# Patient Record
Sex: MALE | Race: WHITE | Employment: OTHER | RURAL
[De-identification: names, ages, dates, MRNs, and addresses within clinical notes are randomized per-mention and may not be internally consistent; named-entity substitution may affect disease eponyms.]

---

## 2017-02-24 ENCOUNTER — HOSPITAL ENCOUNTER (OUTPATIENT)
Dept: OTHER | Age: 52
Discharge: HOME | End: 2017-02-24
Attending: NURSE PRACTITIONER | Admitting: NURSE PRACTITIONER

## 2017-02-24 LAB
ANION GAP SERPL CALCULATED.3IONS-SCNC: 17 MMOL/L (ref 3–16)
BUN BLDV-MCNC: 8 MG/DL (ref 6–20)
CALCIUM SERPL-MCNC: 10.2 MG/DL (ref 8.5–10.5)
CHLORIDE BLD-SCNC: 91 MMOL/L (ref 98–107)
CHOLESTEROL, TOTAL: 218 MG/DL (ref 0–200)
CO2: 28 MMOL/L (ref 20–30)
CREAT SERPL-MCNC: 1.2 MG/DL (ref 0.4–1.2)
GFR AFRICAN AMERICAN: >59
GFR NON-AFRICAN AMERICAN: >59
GLUCOSE BLD-MCNC: 203 MG/DL (ref 74–106)
HBA1C MFR BLD: 7.7 %
HDLC SERPL-MCNC: 45 MG/DL (ref 40–60)
LDL CHOLESTEROL CALCULATED: 99 MG/DL
POTASSIUM SERPL-SCNC: 4.1 MMOL/L (ref 3.4–5.1)
SODIUM BLD-SCNC: 136 MMOL/L (ref 136–145)
TRIGL SERPL-MCNC: 372 MG/DL (ref 0–249)
VLDLC SERPL CALC-MCNC: 74 MG/DL

## 2017-03-13 ENCOUNTER — HOSPITAL ENCOUNTER (OUTPATIENT)
Dept: OTHER | Age: 52
Discharge: HOME | End: 2017-03-13
Attending: NURSE PRACTITIONER | Admitting: NURSE PRACTITIONER

## 2017-03-13 DIAGNOSIS — M54.2 CERVICALGIA: ICD-10-CM

## 2017-03-13 DIAGNOSIS — M54.5 LOW BACK PAIN, UNSPECIFIED BACK PAIN LATERALITY, UNSPECIFIED CHRONICITY, WITH SCIATICA PRESENCE UNSPECIFIED: ICD-10-CM

## 2017-03-31 ENCOUNTER — HOSPITAL ENCOUNTER (OUTPATIENT)
Dept: OTHER | Age: 52
Discharge: HOME | End: 2017-03-31
Attending: NURSE PRACTITIONER | Admitting: NURSE PRACTITIONER

## 2017-04-03 LAB
AMPHETAMINES SCREEN BLOOD: NEGATIVE NG/ML
BARBITURATES SCREEN BLOOD: NEGATIVE NG/ML
BENZODIAZEPINES SCREEN BLOOD: POSITIVE NG/ML
BUPRENORPHINE: NEGATIVE NG/ML
CANNABINOID SCREEN BLOOD: NEGATIVE NG/ML
COCAINE SCREEN BLOOD: NEGATIVE NG/ML
Lab: NORMAL
METHADONE SCREEN BLOOD: NEGATIVE NG/ML
METHAMPHETAMINES SERUM/ PLASMA: NEGATIVE NG/ML
OPIATES SCREEN BLOOD: NEGATIVE NG/ML
OXYCODONE: POSITIVE NG/ML
PHENCYCLIDINE SCREEN BLOOD: NEGATIVE NG/ML

## 2017-04-06 LAB
OPIATES, HYDROCODONE: <2 NG/ML
OPIATES, HYDROMORPHONE: <2 NG/ML
OPIATES, OXYCODONE: 74 NG/ML
OPIATES, OXYMORPHONE: <2 NG/ML
OPIATES, SER/ PLASMA CODEINE: <2 NG/ML
OPIATES, SER/PLAS: <2 NG/ML
OPITATES, MORPHINE: <2 NG/ML

## 2017-06-26 ENCOUNTER — HOSPITAL ENCOUNTER (OUTPATIENT)
Dept: OTHER | Age: 52
Discharge: HOME | End: 2017-06-26
Attending: NURSE PRACTITIONER | Admitting: NURSE PRACTITIONER

## 2017-06-26 LAB
A/G RATIO: 1.7 (ref 0.8–2)
ALBUMIN SERPL-MCNC: 5.1 G/DL (ref 3.4–4.8)
ALP BLD-CCNC: 36 U/L (ref 25–100)
ALT SERPL-CCNC: 22 U/L (ref 4–36)
ANION GAP SERPL CALCULATED.3IONS-SCNC: 16 MMOL/L (ref 3–16)
AST SERPL-CCNC: 16 U/L (ref 8–33)
BASOPHILS ABSOLUTE: 0.1 K/UL (ref 0–0.1)
BASOPHILS RELATIVE PERCENT: 0.9 %
BILIRUB SERPL-MCNC: <0.2 MG/DL (ref 0.3–1.2)
BUN BLDV-MCNC: 11 MG/DL (ref 6–20)
CALCIUM SERPL-MCNC: 10.5 MG/DL (ref 8.5–10.5)
CHLORIDE BLD-SCNC: 94 MMOL/L (ref 98–107)
CO2: 29 MMOL/L (ref 20–30)
CREAT SERPL-MCNC: 1.3 MG/DL (ref 0.4–1.2)
EOSINOPHILS ABSOLUTE: 0.2 K/UL (ref 0–0.4)
EOSINOPHILS RELATIVE PERCENT: 3.4 %
GFR AFRICAN AMERICAN: >59
GFR NON-AFRICAN AMERICAN: 58
GLOBULIN: 3 G/DL
GLUCOSE BLD-MCNC: 136 MG/DL (ref 74–106)
HBA1C MFR BLD: 6.3 %
HCT VFR BLD CALC: 36.4 % (ref 40–54)
HEMOGLOBIN: 12.4 G/DL (ref 13–18)
LYMPHOCYTES ABSOLUTE: 2.1 K/UL (ref 1.5–4)
LYMPHOCYTES RELATIVE PERCENT: 29.6 % (ref 20–40)
MCH RBC QN AUTO: 30.8 PG (ref 27–32)
MCHC RBC AUTO-ENTMCNC: 34.1 G/DL (ref 31–35)
MCV RBC AUTO: 90.3 FL (ref 80–100)
MONOCYTES ABSOLUTE: 0.5 K/UL (ref 0.2–0.8)
MONOCYTES RELATIVE PERCENT: 6.6 % (ref 3–10)
NEUTROPHILS ABSOLUTE: 4.2 K/UL (ref 2–7.5)
NEUTROPHILS RELATIVE PERCENT: 59.5 %
PDW BLD-RTO: 13.6 % (ref 11–16)
PLATELET # BLD: 383 K/UL (ref 150–400)
PMV BLD AUTO: 9.2 FL (ref 6–10)
POTASSIUM SERPL-SCNC: 4.3 MMOL/L (ref 3.4–5.1)
RBC # BLD: 4.03 M/UL (ref 4.5–6)
SODIUM BLD-SCNC: 139 MMOL/L (ref 136–145)
TOTAL PROTEIN: 8.1 G/DL (ref 6.4–8.3)
WBC # BLD: 7 K/UL (ref 4–11)

## 2017-09-07 ENCOUNTER — OFFICE VISIT (OUTPATIENT)
Dept: NEUROLOGY | Facility: CLINIC | Age: 52
End: 2017-09-07

## 2017-09-07 VITALS
BODY MASS INDEX: 28.4 KG/M2 | SYSTOLIC BLOOD PRESSURE: 122 MMHG | WEIGHT: 187.4 LBS | HEART RATE: 95 BPM | DIASTOLIC BLOOD PRESSURE: 78 MMHG | HEIGHT: 68 IN | OXYGEN SATURATION: 95 %

## 2017-09-07 DIAGNOSIS — M54.2 CERVICALGIA: ICD-10-CM

## 2017-09-07 DIAGNOSIS — G43.019 INTRACTABLE MIGRAINE WITHOUT AURA AND WITHOUT STATUS MIGRAINOSUS: Primary | ICD-10-CM

## 2017-09-07 PROCEDURE — 99204 OFFICE O/P NEW MOD 45 MIN: CPT | Performed by: NURSE PRACTITIONER

## 2017-09-07 RX ORDER — IBUPROFEN 600 MG/1
TABLET ORAL
Refills: 0 | COMMUNITY
Start: 2017-08-28

## 2017-09-07 RX ORDER — PROMETHAZINE HYDROCHLORIDE 25 MG/1
TABLET ORAL
Refills: 0 | COMMUNITY
Start: 2017-07-15 | End: 2017-10-24

## 2017-09-07 RX ORDER — ASPIRIN 81 MG/1
TABLET ORAL
COMMUNITY

## 2017-09-07 RX ORDER — AMITRIPTYLINE HYDROCHLORIDE 10 MG/1
10 TABLET, FILM COATED ORAL NIGHTLY
Qty: 30 TABLET | Refills: 3 | Status: SHIPPED | OUTPATIENT
Start: 2017-09-07 | End: 2017-10-24

## 2017-09-07 RX ORDER — CYCLOBENZAPRINE HCL 10 MG
TABLET ORAL
Refills: 0 | COMMUNITY
Start: 2017-08-08 | End: 2017-10-24

## 2017-09-07 RX ORDER — PROPRANOLOL HYDROCHLORIDE 20 MG/1
TABLET ORAL
COMMUNITY
Start: 2013-10-15

## 2017-09-07 RX ORDER — BUPROPION HYDROCHLORIDE 75 MG/1
TABLET ORAL
COMMUNITY
Start: 2013-09-24

## 2017-09-07 RX ORDER — DULOXETIN HYDROCHLORIDE 60 MG/1
CAPSULE, DELAYED RELEASE ORAL
COMMUNITY
Start: 2013-04-30
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
## 2017-09-21 ENCOUNTER — HOSPITAL ENCOUNTER (OUTPATIENT)
Dept: MRI IMAGING | Facility: HOSPITAL | Age: 52
Discharge: HOME | End: 2017-09-21
Admitting: NURSE PRACTITIONER

## 2017-09-21 DIAGNOSIS — G43.019 INTRACTABLE MIGRAINE WITHOUT AURA AND WITHOUT STATUS MIGRAINOSUS: ICD-10-CM

## 2017-09-21 PROCEDURE — A9577 INJ MULTIHANCE: HCPCS | Performed by: NURSE PRACTITIONER

## 2017-09-21 PROCEDURE — 70553 MRI BRAIN STEM W/O & W/DYE: CPT

## 2017-09-21 PROCEDURE — 25510000001 GADOBENATE DIMEGLUMINE 529 MG/ML SOLUTION: Performed by: NURSE PRACTITIONER

## 2017-09-21 RX ADMIN — GADOBENATE DIMEGLUMINE 15 ML: 529 INJECTION, SOLUTION INTRAVENOUS at 13:45

## 2017-10-24 ENCOUNTER — PROCEDURE VISIT (OUTPATIENT)
Dept: NEUROLOGY | Facility: CLINIC | Age: 52
End: 2017-10-24

## 2017-10-24 VITALS
BODY MASS INDEX: 28.04 KG/M2 | HEIGHT: 68 IN | OXYGEN SATURATION: 98 % | SYSTOLIC BLOOD PRESSURE: 148 MMHG | DIASTOLIC BLOOD PRESSURE: 82 MMHG | WEIGHT: 185 LBS | HEART RATE: 87 BPM

## 2017-10-24 DIAGNOSIS — G43.019 INTRACTABLE MIGRAINE WITHOUT AURA AND WITHOUT STATUS MIGRAINOSUS: Primary | ICD-10-CM

## 2017-10-24 DIAGNOSIS — M54.2 CERVICALGIA: ICD-10-CM

## 2017-10-24 PROCEDURE — 20553 NJX 1/MLT TRIGGER POINTS 3/>: CPT | Performed by: NURSE PRACTITIONER

## 2017-10-24 RX ORDER — BUPIVACAINE HYDROCHLORIDE 5 MG/ML
5 INJECTION, SOLUTION PERINEURAL ONCE
Status: SHIPPED | OUTPATIENT
Start: 2017-10-24

## 2017-10-24 RX ORDER — SUMATRIPTAN 50 MG/1
50 TABLET, FILM COATED ORAL ONCE AS NEEDED
Qty: 9 TABLET | Refills: 2 | Status: SHIPPED | OUTPATIENT
Start: 2017-10-24 | End: 2017-11-23

## 2017-10-24 RX ORDER — METHYLPREDNISOLONE ACETATE 40 MG/ML
200 INJECTION, SUSPENSION INTRA-ARTICULAR; INTRALESIONAL; INTRAMUSCULAR; SOFT TISSUE ONCE
Status: SHIPPED | OUTPATIENT
Start: 2017-10-24

## 2017-10-24 RX ORDER — TIZANIDINE 4 MG/1
4 TABLET ORAL 3 TIMES DAILY
Qty: 90 TABLET | Refills: 1 | Status: SHIPPED | OUTPATIENT
Start: 2017-10-24 | End: 2018-01-18 | Stop reason: SDUPTHER

## 2017-10-24 RX ORDER — METHYLPREDNISOLONE ACETATE 40 MG/ML
200 INJECTION, SUSPENSION INTRA-ARTICULAR; INTRALESIONAL; INTRAMUSCULAR; SOFT TISSUE ONCE
Status: COMPLETED | OUTPATIENT
Start: 2017-10-24 | End: 2017-10-24

## 2017-10-24 RX ORDER — HYDROCODONE BITARTRATE AND ACETAMINOPHEN 10; 325 MG/1; MG/1
TABLET ORAL
COMMUNITY

## 2017-10-24 RX ORDER — AMITRIPTYLINE HYDROCHLORIDE 10 MG/1
10 TABLET, FILM COATED ORAL NIGHTLY
Qty: 30 TABLET | Refills: 2 | Status: SHIPPED | OUTPATIENT
Start: 2017-10-24

## 2017-10-24 RX ORDER — BUPIVACAINE HYDROCHLORIDE 5 MG/ML
5 INJECTION, SOLUTION PERINEURAL ONCE
Status: COMPLETED | OUTPATIENT
Start: 2017-10-24 | End: 2017-10-24

## 2017-10-24 RX ADMIN — METHYLPREDNISOLONE ACETATE 200 MG: 40 INJECTION, SUSPENSION INTRA-ARTICULAR; INTRALESIONAL; INTRAMUSCULAR; SOFT TISSUE at 11:27

## 2017-10-24 RX ADMIN — BUPIVACAINE HYDROCHLORIDE 5 ML: 5 INJECTION, SOLUTION PERINEURAL at 11:27

## 2017-10-24 NOTE — PROGRESS NOTES
Patient is suffering from headache and myofacial pain syndrome. Risks and benefits of the Trigger point injection procedure have been explained to the patient.  Patient has no contraindications such as bleed diathesis, recent acute trauma at the muscle sites, anesthetic allergy or anticoagulation.  Mechanism of trigger point injection has been explained to patient.     Procedure Note:  1.         Patient was positioned comfortably  2.         Before injections are started, 10 Trigger Points sites are identified throughout the bilateral upper trapezius muscle, levator scapulae, and erector spinae muscles.    3.         Overlying skin area was cleaned with Alcohol swab                                                                                                              4.         Before injection, trigger points sites were palpated for local twitch and referred pain to confirms placement                         5.         Local anesthetic was mixed with Depo-Medrol (5 cc of Marcaine 0.5% mixed with 5 cc of Depo-Medrol at 40 mg/ml - for a total of 10 cc)  6.         30 gauge ½ inch needle was utilized to ensure patient comfort          7.         I started with the most tender spot in above mentioned Trigger Points   1.   Localize most tender spot within taut muscle-fibers  2.   Fix tender spot between fingers (1-2 cm in size)   1.   Prevents from rolling away from needle  2.   Controls subcutaneous bleeding  3.   Before injection, patient was instructed of possible pain on injection  4.   Direct needle at 30 degree angle off skin   8.         Insert needle into skin 1-2 cm from Trigger Point   9.         Advance needle into Trigger Point   10.       Use 1cc anesthetic at each Trigger Points identified in step #2  11.       Redirect needle and reinject                                                                                              1.   Withdraw needle to subcutaneous tissue  2.   Redirect needle  into adjacent tender areas  3.   Repeat until local twitch or tautness resolves  12.   After each of the 7 injections I held direct pressure at injection site for 2 minutes to prevents hematoma formation  13.   The same procedure was repeated for other Tender Points located in the upper trapezius muscle, levator scapulae and erector spinae bilaterally  14.   Patient was instructed to gently stretch injected areas to full active range of motion in all directions and was instructed to repeat range of motion three times after injection  15.   Post-Procedure patient was instructed to avoid over-using injected area for 3-4 days   1.   Maintain active range of motion of injected muscle  2.   Patient applies ice to injected areas for a few hours  3.   Anticipate post-injection soreness for 3-4 days  There was no evidence of complications from injection was noted such as local Skin Infection  at injection site or local hematoma at injection site  Patient with a migraine when starting trigger point injections.  Patient declines the last 3 injections

## 2017-10-24 NOTE — PROGRESS NOTES
"Subjective   Sumit Echeverria is a 52 y.o. male     Chief Complaint   Patient presents with   • Procedure     Pt is here for trigger injections. This is the first round of injections for neck pain.        History of Present Illness    The following portions of the patient's history were reviewed and updated as appropriate: allergies, current medications, past family history, past medical history, past social history, past surgical history and problem list.    Review of Systems    Objective         Vitals:    10/24/17 0925   BP: 148/82   Pulse: 87   SpO2: 98%   Weight: 185 lb (83.9 kg)   Height: 68\" (172.7 cm)     GENERAL: Patient is pleasant, cooperative, appears to be stated age.  Body habitus is endomorphic.  SKIN AND EXTREMITIES:  No skin rashes or lesions are noted.  No cyanosis, clubbing or edema of the extremities.    HEAD:  Head is normocephalic and atraumatic.    NECK: Neck are non-tender without thyromegaly or adenopathy.  Carotic upstrokes are 1+/4.  No cranial or cervical bruits.  The neck is supple with a full range of motion.   ENT: palate elevate symmetrically, no evidence of high arch palate, tongue midline erythema in posterior pharynx, Mallampati Classification Class III   CARDIOVASCULAR:  Regular rate and rhythm with normal S1 and S2 without rub or gallop.  RESPIRATORY:  Clear to auscultation without wheezes or crackle   BACK:  Back is straight without midline defect.    PSYCH:  Higher cortical function/mental status:  The patient is alert.  He  is oriented x3 to time, place and person.  Recent and the remote memory appear normal.  The patient has a good fund of knowledge.  There is no visual or auditory hallucination or suicidal or homicidal ideation.  SPEECH:There is no gross evidence of aphasia, dysarthria or agnosia.      CRANIAL NERVES:  Pupils are 4mm, equal round reactive to light, reacting briskly to 2mm without afferent pupillary defect.  Visual fields are intact to confrontation testing.  " Fundoscopic examination reveals sharp disk margins with normal vasculature.  No papilledema, hemorrhages or exudates.  Extraocular movements are full and smooth with normal pursuits and saccades.  No nystagmus noted.  The face is symmetric. palate elevate symmetrically, Tongue midline, positive gag reflex. The remainder of the cranial nerves are intact and symmetrical.    MOTOR: Strength is 5/5 throughout with normal tone and bulk  No involuntary movements noted.    DTR: are 2/4 and symmetrical in the upper extremities, 2/4 and symmetrical at the knees and 1/4 and symmetrical at the Achilles tendon.  Plantar responses were down-going bilaterally.    SENSATION:  Intact to pinprick, light touch, vibration and proprioception.  Coordination:  The patient normally performs finger-nose-finger, heel-to-knee-to-shin and rapid alternating movements in symmetrical fashion.    COORDINATION AND GAIT:  The patient walks with a narrow-based gait.  Patient is able to heel-toe and tandem walk forward and backwards without difficulty.  Romberg and monopedal  Romberg are negative.    MUSCULOSKELETAL: Range of motion normal, no clubbing, cyanosis, or edema.  No joint swelling.     I have personally reviewed the above labs and imaging studies.    Assessment/Plan   1 migraines: We will start patient on Elavil since patient has a postconcussive type migraine.  Continue with Inderal as Cymbalta and Flexeril.  She is also willing to have an MRI of his brain with and without this has been ordered.     2.  Cervicalgia: Offered patient physical therapy at this point patient does not feel that he would be able to tolerate as he is extremely tender.  Patient is willing to start trigger point injections.  Thank you

## 2018-01-19 RX ORDER — TIZANIDINE 4 MG/1
TABLET ORAL
Qty: 90 TABLET | Refills: 0 | Status: SHIPPED | OUTPATIENT
Start: 2018-01-19

## 2018-01-30 ENCOUNTER — PROCEDURE VISIT (OUTPATIENT)
Dept: NEUROLOGY | Facility: CLINIC | Age: 53
End: 2018-01-30

## 2018-01-30 VITALS
HEIGHT: 68 IN | WEIGHT: 185 LBS | DIASTOLIC BLOOD PRESSURE: 88 MMHG | OXYGEN SATURATION: 98 % | BODY MASS INDEX: 28.04 KG/M2 | HEART RATE: 112 BPM | SYSTOLIC BLOOD PRESSURE: 108 MMHG

## 2018-01-30 DIAGNOSIS — M54.41 CHRONIC BILATERAL LOW BACK PAIN WITH BILATERAL SCIATICA: Primary | ICD-10-CM

## 2018-01-30 DIAGNOSIS — G89.29 CHRONIC BILATERAL LOW BACK PAIN WITH BILATERAL SCIATICA: Primary | ICD-10-CM

## 2018-01-30 DIAGNOSIS — M54.2 CERVICALGIA: ICD-10-CM

## 2018-01-30 DIAGNOSIS — M54.42 CHRONIC BILATERAL LOW BACK PAIN WITH BILATERAL SCIATICA: Primary | ICD-10-CM

## 2018-01-30 RX ORDER — METHYLPREDNISOLONE 4 MG/1
TABLET ORAL
Refills: 0 | COMMUNITY
Start: 2018-01-23

## 2018-01-30 RX ORDER — CEFDINIR 300 MG/1
CAPSULE ORAL
Refills: 0 | COMMUNITY
Start: 2018-01-23

## 2018-01-30 NOTE — PROGRESS NOTES
Subjective   Sumit Echeverria is a 52 y.o. male    Chief Complaint   Patient presents with   • Procedure     Pt is here for trigger injections.        History of Present Illness    Pt 53 yo male in clinic to get trigger point injections. Pt states this will be his 6th set of injections without relief. Pt states he does not want injections today.  Pt has cervicalgia with daily headaches with 15 migraines monthly. + occipital radiating bilat temples left > right Pt has failed multiple meds and trigger points.  Failed PT.  Pt receiving hydrocodone for back injuries with pain.  Pt states this is not helping.  Requests pain clinic appt     The following portions of the patient's history were reviewed and updated as appropriate: allergies, current medications, past family history, past medical history, past social history, past surgical history and problem list.    Review of Systems   Constitutional: Negative for chills and fever.   HENT: Negative for congestion, ear pain, hearing loss, rhinorrhea and sore throat.    Eyes: Negative for pain, discharge and redness.   Respiratory: Negative for cough, shortness of breath, wheezing and stridor.    Cardiovascular: Negative for chest pain, palpitations and leg swelling.   Gastrointestinal: Negative for abdominal pain, constipation, nausea and vomiting.   Endocrine: Negative for cold intolerance, heat intolerance and polyphagia.   Genitourinary: Negative for dysuria, flank pain, frequency and urgency.   Musculoskeletal: Positive for arthralgias, back pain, joint swelling, myalgias, neck pain and neck stiffness.   Skin: Negative for pallor, rash and wound.   Allergic/Immunologic: Negative for environmental allergies.   Neurological: Positive for headaches. Negative for dizziness, tremors, seizures, syncope, facial asymmetry, speech difficulty, weakness, light-headedness and numbness.   Hematological: Negative for adenopathy.   Psychiatric/Behavioral: Negative for confusion and  "hallucinations. The patient is nervous/anxious.        Objective         Vitals:    01/30/18 0935   BP: 108/88   Pulse: 112   SpO2: 98%   Weight: 83.9 kg (185 lb)   Height: 172.7 cm (68\")     GENERAL: Patient is pleasant, cooperative, appears to be stated age.  Body habitus is endomorphic.  HEAD:  Head is normocephalic and atraumatic.    NECK: Neck are non-tender without thyromegaly or adenopathy.  Carotic upstrokes are 1+/4.  No cranial or cervical bruits.  The neck is supple with a full range of motion.   CARDIOVASCULAR:  Regular rate and rhythm with normal S1 and S2 without rub or gallop.  RESPIRATORY:  Clear to auscultation without wheezes or crackle   BACK:  + pain neck and lumbar + sciatica bilat left > right   PSYCH: The patient is alert.  He  is oriented x3 to time, place and person.  Recent and the remote memory appear normal.  The patient has a good fund of knowledge.  There is no visual or auditory hallucination or suicidal or homicidal ideation.  SPEECH:There is no gross evidence of aphasia, dysarthria or agnosia.      COORDINATION AND GAIT:  The patient walks with a wide-based gait.      I have personally reviewed the above labs and imaging studies.    Assessment/Plan     1. Migraines >15 monthly >4 hours with headaches daily:  Failed multiple meds with trigger points.  Sched botox    2. Lumbar pain with cervicalgia:  Failed PT.  Consult pain clinic written.           "

## 2018-02-27 ENCOUNTER — PROCEDURE VISIT (OUTPATIENT)
Dept: NEUROLOGY | Facility: CLINIC | Age: 53
End: 2018-02-27

## 2018-02-27 VITALS
HEIGHT: 68 IN | HEART RATE: 100 BPM | WEIGHT: 185 LBS | SYSTOLIC BLOOD PRESSURE: 112 MMHG | OXYGEN SATURATION: 98 % | BODY MASS INDEX: 28.04 KG/M2 | DIASTOLIC BLOOD PRESSURE: 84 MMHG

## 2018-02-27 DIAGNOSIS — G43.119 INTRACTABLE MIGRAINE WITH AURA WITHOUT STATUS MIGRAINOSUS: Primary | ICD-10-CM

## 2018-02-27 PROCEDURE — 64615 CHEMODENERV MUSC MIGRAINE: CPT | Performed by: NURSE PRACTITIONER

## 2018-02-27 RX ORDER — CYCLOBENZAPRINE HCL 10 MG
TABLET ORAL
Refills: 3 | COMMUNITY
Start: 2018-02-17

## 2018-02-27 RX ORDER — KETOROLAC TROMETHAMINE 30 MG/ML
60 INJECTION, SOLUTION INTRAMUSCULAR; INTRAVENOUS ONCE
Status: COMPLETED | OUTPATIENT
Start: 2018-02-27 | End: 2018-02-27

## 2018-02-27 RX ORDER — PROPRANOLOL HYDROCHLORIDE 10 MG/1
TABLET ORAL
Refills: 0 | COMMUNITY
Start: 2018-02-05

## 2018-02-27 RX ADMIN — KETOROLAC TROMETHAMINE 60 MG: 30 INJECTION, SOLUTION INTRAMUSCULAR; INTRAVENOUS at 11:59

## 2018-02-27 NOTE — PROGRESS NOTES
Patient has over 15 migraines a month over 4 hours duration interfering with the patient daily activities despite conservative medical treatment for acute and preventive measures.    The risk and benefit of the Botox treatment has been discussed with patient.  Safety information and contraindication of Botox has been reviewed with patient.  The most frequent Adverse reactions of Botox for migraine include but not limited to neck pain 9%, headache 5%, Ptosis 4%, muscle weakness 4%, injection site pain 3%, muscle spasms 2% have been gone over with our patient.  200 unit vial Botox was re-constituted with 4 mL 0.9 NS to 5 unit/0.1 mL using standard techniques.  10 units were given at the  muscle in 2 divided sites  5 units were given at the Procerus muscle  20 units were given at the Frontalis muscle in 4 divided sites  40 units were given at the Temporalis muscle in 8 divided sites  30 units were given at the Occipitalis muscle in 6 divided sites  20 units were given at the cervical paraspinal muscle in 4 divided sites  30 units were given at the Trapezius muscle in 6 divided sites  For a total of 155 units divided between 31 sites and 45 units of wastage  Patient tolerated procedure well without complication.

## 2018-05-01 ENCOUNTER — TELEPHONE (OUTPATIENT)
Dept: NEUROLOGY | Facility: CLINIC | Age: 53
End: 2018-05-01

## 2018-05-15 DIAGNOSIS — G43.019 INTRACTABLE MIGRAINE WITHOUT AURA AND WITHOUT STATUS MIGRAINOSUS: ICD-10-CM

## 2018-05-15 DIAGNOSIS — G43.119 INTRACTABLE MIGRAINE WITH AURA WITHOUT STATUS MIGRAINOSUS: Primary | ICD-10-CM

## 2018-05-21 ENCOUNTER — TELEPHONE (OUTPATIENT)
Dept: NEUROLOGY | Facility: CLINIC | Age: 53
End: 2018-05-21

## 2018-07-26 ENCOUNTER — HOSPITAL ENCOUNTER (OUTPATIENT)
Facility: HOSPITAL | Age: 53
Discharge: HOME | End: 2018-07-26
Payer: MEDICARE

## 2018-07-26 LAB
A/G RATIO: 1.9 (ref 0.8–2)
ALBUMIN SERPL-MCNC: 5.2 G/DL (ref 3.4–4.8)
ALP BLD-CCNC: 46 U/L (ref 25–100)
ALT SERPL-CCNC: 20 U/L (ref 4–36)
ANION GAP SERPL CALCULATED.3IONS-SCNC: 14 MMOL/L (ref 3–16)
AST SERPL-CCNC: 17 U/L (ref 8–33)
BASOPHILS ABSOLUTE: 0.1 K/UL (ref 0–0.1)
BASOPHILS RELATIVE PERCENT: 1 %
BILIRUB SERPL-MCNC: 0.3 MG/DL (ref 0.3–1.2)
BUN BLDV-MCNC: 6 MG/DL (ref 6–20)
CALCIUM SERPL-MCNC: 10 MG/DL (ref 8.5–10.5)
CHLORIDE BLD-SCNC: 97 MMOL/L (ref 98–107)
CHOLESTEROL, TOTAL: 255 MG/DL (ref 0–200)
CO2: 27 MMOL/L (ref 20–30)
CREAT SERPL-MCNC: 1.2 MG/DL (ref 0.4–1.2)
EOSINOPHILS ABSOLUTE: 0.1 K/UL (ref 0–0.4)
EOSINOPHILS RELATIVE PERCENT: 2.3 %
FOLATE: 11.44 NG/ML
GFR AFRICAN AMERICAN: >59
GFR NON-AFRICAN AMERICAN: >59
GLOBULIN: 2.8 G/DL
GLUCOSE BLD-MCNC: 131 MG/DL (ref 74–106)
HBA1C MFR BLD: 6.4 %
HCT VFR BLD CALC: 41.9 % (ref 40–54)
HDLC SERPL-MCNC: 47 MG/DL (ref 40–60)
HEMOGLOBIN: 13.4 G/DL (ref 13–18)
IMMATURE GRANULOCYTES #: 0 K/UL
IMMATURE GRANULOCYTES %: 0.3 % (ref 0–5)
LDL CHOLESTEROL CALCULATED: 155 MG/DL
LYMPHOCYTES ABSOLUTE: 1.7 K/UL (ref 1.5–4)
LYMPHOCYTES RELATIVE PERCENT: 27.7 %
MCH RBC QN AUTO: 29.1 PG (ref 27–32)
MCHC RBC AUTO-ENTMCNC: 32 G/DL (ref 31–35)
MCV RBC AUTO: 91.1 FL (ref 80–100)
MONOCYTES ABSOLUTE: 0.5 K/UL (ref 0.2–0.8)
MONOCYTES RELATIVE PERCENT: 8.5 %
NEUTROPHILS ABSOLUTE: 3.6 K/UL (ref 2–7.5)
NEUTROPHILS RELATIVE PERCENT: 60.2 %
PDW BLD-RTO: 12.5 % (ref 11–16)
PLATELET # BLD: 399 K/UL (ref 150–400)
PMV BLD AUTO: 10 FL (ref 6–10)
POTASSIUM SERPL-SCNC: 4.7 MMOL/L (ref 3.4–5.1)
RBC # BLD: 4.6 M/UL (ref 4.5–6)
SODIUM BLD-SCNC: 138 MMOL/L (ref 136–145)
TOTAL PROTEIN: 8 G/DL (ref 6.4–8.3)
TRIGL SERPL-MCNC: 263 MG/DL (ref 0–249)
TSH SERPL DL<=0.05 MIU/L-ACNC: 1.65 UIU/ML (ref 0.35–5.5)
VITAMIN B-12: 258 PG/ML (ref 211–911)
VLDLC SERPL CALC-MCNC: 53 MG/DL
WBC # BLD: 6 K/UL (ref 4–11)

## 2018-07-26 PROCEDURE — 80053 COMPREHEN METABOLIC PANEL: CPT

## 2018-07-26 PROCEDURE — 84443 ASSAY THYROID STIM HORMONE: CPT

## 2018-07-26 PROCEDURE — 36415 COLL VENOUS BLD VENIPUNCTURE: CPT

## 2018-07-26 PROCEDURE — 85025 COMPLETE CBC W/AUTO DIFF WBC: CPT

## 2018-07-26 PROCEDURE — 82607 VITAMIN B-12: CPT

## 2018-07-26 PROCEDURE — 80061 LIPID PANEL: CPT

## 2018-07-26 PROCEDURE — 82746 ASSAY OF FOLIC ACID SERUM: CPT

## 2018-07-26 PROCEDURE — 83036 HEMOGLOBIN GLYCOSYLATED A1C: CPT

## 2018-08-07 ENCOUNTER — OFFICE VISIT (OUTPATIENT)
Dept: NEUROLOGY | Facility: CLINIC | Age: 53
End: 2018-08-07

## 2018-08-07 VITALS
HEART RATE: 98 BPM | WEIGHT: 185 LBS | SYSTOLIC BLOOD PRESSURE: 106 MMHG | DIASTOLIC BLOOD PRESSURE: 84 MMHG | BODY MASS INDEX: 28.04 KG/M2 | OXYGEN SATURATION: 96 % | HEIGHT: 68 IN

## 2018-08-07 DIAGNOSIS — IMO0002 CHRONIC MIGRAINE: Primary | ICD-10-CM

## 2018-08-07 DIAGNOSIS — G43.711 CHRONIC MIGRAINE WITHOUT AURA, INTRACTABLE, WITH STATUS MIGRAINOSUS: ICD-10-CM

## 2018-08-07 PROCEDURE — 99213 OFFICE O/P EST LOW 20 MIN: CPT | Performed by: NURSE PRACTITIONER

## 2018-08-07 NOTE — PROGRESS NOTES
Subjective   Sumit Echeverria is a 52 y.o. male     Chief Complaint   Patient presents with   • Follow-up     Pt is here for a Fu for botox and migraines. Pt states that he is still having approx 5 migraines a week and is not finding relief for them.        History of Present Illness     The patient is very pleasant 52-year-old male in clinic today to follow-up for migraines.  Patient had Botox in February and states it reduced his migraines to 15 or 20 monthly.  That was the first time he never had Botox.  Patient was unable to make his May appointment and as a result has been declined by insurance today to have Botox.  She continues with 30 migraines monthly he has a headache every single day.  Pain level is an 8-9 out of 10 with Botox his migraines were reduced to as noted above 15 migraines a month with about 5 regular headaches each month.  Discussed in clinic with patient today a mosaic injections patient would like to wait and try Botox more time as it did reduced by half his migraines.  He does have photophobia phonophobia and nausea associated with his headaches he has blurry vision associated with his headaches patient is unable to function when he has his migraines and has to go lay down for hours.  States that his migraines last at least 8-12 hours.    The following portions of the patient's history were reviewed and updated as appropriate: allergies, current medications, past family history, past medical history, past social history, past surgical history and problem list.    Review of Systems   Constitutional: Negative for chills and fever.   HENT: Negative for congestion, ear pain, hearing loss, rhinorrhea and sore throat.    Eyes: Negative for pain, discharge and redness.   Respiratory: Negative for cough, shortness of breath, wheezing and stridor.    Cardiovascular: Negative for chest pain, palpitations and leg swelling.   Gastrointestinal: Negative for abdominal pain, constipation, nausea and  "vomiting.   Endocrine: Negative for cold intolerance, heat intolerance and polyphagia.   Genitourinary: Negative for dysuria, flank pain, frequency and urgency.   Musculoskeletal: Positive for arthralgias, back pain, myalgias, neck pain and neck stiffness. Negative for joint swelling.   Skin: Negative for pallor, rash and wound.   Allergic/Immunologic: Negative for environmental allergies.   Neurological: Positive for headaches. Negative for dizziness, tremors, seizures, syncope, facial asymmetry, speech difficulty, weakness, light-headedness and numbness.   Hematological: Negative for adenopathy.   Psychiatric/Behavioral: Negative for confusion and hallucinations. The patient is nervous/anxious.        Objective         Vitals:    08/07/18 0849   BP: 106/84   Pulse: 98   SpO2: 96%   Weight: 83.9 kg (185 lb)   Height: 172.7 cm (68\")     GENERAL: Patient is pleasant, cooperative, appears to be stated age.  Body habitus is endomorphic.  HEAD:  Head is normocephalic and atraumatic.    NECK: Neck are non-tender without thyromegaly or adenopathy.  Carotic upstrokes are 1+/4.  No cranial or cervical bruits.  The neck is supple with a full range of motion.   CARDIOVASCULAR:  Regular rate and rhythm with normal S1 and S2 without rub or gallop.  RESPIRATORY:  Clear to auscultation without wheezes or crackle   PSYCH:  Higher cortical function/mental status:  The patient is alert.  He  is oriented x3 to time, place and person.  Recent and the remote memory appear normal.  The patient has a good fund of knowledge.  There is no visual or auditory hallucination or suicidal or homicidal ideation.  SPEECH:There is no gross evidence of aphasia, dysarthria or agnosia.      I have personally reviewed the above labs.    Assessment/Plan     Migraine headaches: Patient with previous Botox use C subjective data for information on patient's migraines.  We will have Botox ordered in 4 weeks.  Patient will consider adding a Aimovig to his " regimen.

## 2018-09-04 DIAGNOSIS — G43.119 INTRACTABLE MIGRAINE WITH AURA WITHOUT STATUS MIGRAINOSUS: ICD-10-CM

## 2018-10-20 ENCOUNTER — HOSPITAL ENCOUNTER (EMERGENCY)
Facility: HOSPITAL | Age: 53
Discharge: HOME | End: 2018-10-20
Attending: EMERGENCY MEDICINE
Payer: MEDICARE

## 2018-10-20 VITALS
HEIGHT: 69 IN | TEMPERATURE: 98.9 F | HEART RATE: 99 BPM | DIASTOLIC BLOOD PRESSURE: 107 MMHG | OXYGEN SATURATION: 93 % | BODY MASS INDEX: 26.66 KG/M2 | RESPIRATION RATE: 18 BRPM | WEIGHT: 180 LBS | SYSTOLIC BLOOD PRESSURE: 139 MMHG

## 2018-10-20 DIAGNOSIS — G43.009 MIGRAINE WITHOUT AURA AND WITHOUT STATUS MIGRAINOSUS, NOT INTRACTABLE: Primary | ICD-10-CM

## 2018-10-20 PROCEDURE — 99282 EMERGENCY DEPT VISIT SF MDM: CPT

## 2018-10-20 PROCEDURE — 96372 THER/PROPH/DIAG INJ SC/IM: CPT

## 2018-10-20 PROCEDURE — 6360000002 HC RX W HCPCS: Performed by: EMERGENCY MEDICINE

## 2018-10-20 RX ORDER — MEPERIDINE HYDROCHLORIDE 50 MG/ML
50 INJECTION INTRAMUSCULAR; INTRAVENOUS; SUBCUTANEOUS ONCE
Status: COMPLETED | OUTPATIENT
Start: 2018-10-20 | End: 2018-10-20

## 2018-10-20 RX ORDER — PROMETHAZINE HYDROCHLORIDE 25 MG/ML
12.5 INJECTION, SOLUTION INTRAMUSCULAR; INTRAVENOUS ONCE
Status: COMPLETED | OUTPATIENT
Start: 2018-10-20 | End: 2018-10-20

## 2018-10-20 RX ADMIN — PROMETHAZINE HYDROCHLORIDE 12.5 MG: 25 INJECTION INTRAMUSCULAR; INTRAVENOUS at 18:16

## 2018-10-20 RX ADMIN — MEPERIDINE HYDROCHLORIDE 50 MG: 50 INJECTION INTRAMUSCULAR; INTRAVENOUS; SUBCUTANEOUS at 18:16

## 2018-10-20 ASSESSMENT — PAIN SCALES - GENERAL
PAINLEVEL_OUTOF10: 10
PAINLEVEL_OUTOF10: 9

## 2018-10-20 ASSESSMENT — PAIN DESCRIPTION - FREQUENCY: FREQUENCY: CONTINUOUS

## 2018-10-20 ASSESSMENT — PAIN - FUNCTIONAL ASSESSMENT: PAIN_FUNCTIONAL_ASSESSMENT: 0-10

## 2018-10-20 ASSESSMENT — ENCOUNTER SYMPTOMS
DIARRHEA: 0
EYE REDNESS: 0
EYE DISCHARGE: 0
ABDOMINAL PAIN: 0
WHEEZING: 0
BACK PAIN: 0
EYE PAIN: 0
SHORTNESS OF BREATH: 0
COUGH: 0
SORE THROAT: 0
CHEST TIGHTNESS: 0
CONSTIPATION: 0
SINUS PRESSURE: 0
TROUBLE SWALLOWING: 0
NAUSEA: 0
RHINORRHEA: 0
VOMITING: 0

## 2018-10-20 ASSESSMENT — PAIN DESCRIPTION - PAIN TYPE
TYPE: ACUTE PAIN
TYPE: ACUTE PAIN

## 2018-10-20 ASSESSMENT — PAIN DESCRIPTION - PROGRESSION: CLINICAL_PROGRESSION: GRADUALLY WORSENING

## 2018-10-20 ASSESSMENT — PAIN DESCRIPTION - LOCATION: LOCATION: HEAD

## 2018-10-20 ASSESSMENT — PAIN DESCRIPTION - ONSET: ONSET: GRADUAL

## 2018-10-21 ENCOUNTER — HOSPITAL ENCOUNTER (EMERGENCY)
Facility: HOSPITAL | Age: 53
Discharge: HOME | End: 2018-10-21
Attending: FAMILY MEDICINE
Payer: MEDICARE

## 2018-10-21 VITALS
HEIGHT: 68 IN | DIASTOLIC BLOOD PRESSURE: 97 MMHG | BODY MASS INDEX: 27.28 KG/M2 | SYSTOLIC BLOOD PRESSURE: 144 MMHG | WEIGHT: 180 LBS | RESPIRATION RATE: 18 BRPM | TEMPERATURE: 98.4 F | OXYGEN SATURATION: 97 % | HEART RATE: 106 BPM

## 2018-10-21 DIAGNOSIS — R51.9 NONINTRACTABLE HEADACHE, UNSPECIFIED CHRONICITY PATTERN, UNSPECIFIED HEADACHE TYPE: Primary | ICD-10-CM

## 2018-10-21 PROCEDURE — 2580000003 HC RX 258: Performed by: FAMILY MEDICINE

## 2018-10-21 PROCEDURE — 99283 EMERGENCY DEPT VISIT LOW MDM: CPT

## 2018-10-21 PROCEDURE — 2580000003 HC RX 258

## 2018-10-21 PROCEDURE — 2580000003 HC RX 258: Performed by: EMERGENCY MEDICINE

## 2018-10-21 PROCEDURE — 96375 TX/PRO/DX INJ NEW DRUG ADDON: CPT

## 2018-10-21 PROCEDURE — 96365 THER/PROPH/DIAG IV INF INIT: CPT

## 2018-10-21 PROCEDURE — 2500000003 HC RX 250 WO HCPCS: Performed by: EMERGENCY MEDICINE

## 2018-10-21 PROCEDURE — 2500000003 HC RX 250 WO HCPCS

## 2018-10-21 PROCEDURE — 6360000002 HC RX W HCPCS: Performed by: FAMILY MEDICINE

## 2018-10-21 RX ORDER — ONDANSETRON 2 MG/ML
4 INJECTION INTRAMUSCULAR; INTRAVENOUS ONCE
Status: COMPLETED | OUTPATIENT
Start: 2018-10-21 | End: 2018-10-21

## 2018-10-21 RX ORDER — VALPROATE SODIUM 100 MG/ML
INJECTION, SOLUTION INTRAVENOUS
Status: COMPLETED
Start: 2018-10-21 | End: 2018-10-21

## 2018-10-21 RX ORDER — DEXTROSE MONOHYDRATE 50 MG/ML
INJECTION, SOLUTION INTRAVENOUS
Status: COMPLETED
Start: 2018-10-21 | End: 2018-10-21

## 2018-10-21 RX ORDER — KETOROLAC TROMETHAMINE 30 MG/ML
30 INJECTION, SOLUTION INTRAMUSCULAR; INTRAVENOUS ONCE
Status: COMPLETED | OUTPATIENT
Start: 2018-10-21 | End: 2018-10-21

## 2018-10-21 RX ORDER — SODIUM CHLORIDE, SODIUM LACTATE, POTASSIUM CHLORIDE, CALCIUM CHLORIDE 600; 310; 30; 20 MG/100ML; MG/100ML; MG/100ML; MG/100ML
1000 INJECTION, SOLUTION INTRAVENOUS ONCE
Status: COMPLETED | OUTPATIENT
Start: 2018-10-21 | End: 2018-10-21

## 2018-10-21 RX ORDER — LORAZEPAM 2 MG/ML
1 INJECTION INTRAMUSCULAR ONCE
Status: COMPLETED | OUTPATIENT
Start: 2018-10-21 | End: 2018-10-21

## 2018-10-21 RX ORDER — DEXAMETHASONE SODIUM PHOSPHATE 4 MG/ML
4 INJECTION, SOLUTION INTRA-ARTICULAR; INTRALESIONAL; INTRAMUSCULAR; INTRAVENOUS; SOFT TISSUE ONCE
Status: COMPLETED | OUTPATIENT
Start: 2018-10-21 | End: 2018-10-21

## 2018-10-21 RX ADMIN — LORAZEPAM 1 MG: 2 INJECTION, SOLUTION INTRAMUSCULAR; INTRAVENOUS at 18:42

## 2018-10-21 RX ADMIN — DEXTROSE MONOHYDRATE 500 MG: 5 INJECTION INTRAVENOUS at 19:23

## 2018-10-21 RX ADMIN — ONDANSETRON 4 MG: 2 INJECTION INTRAMUSCULAR; INTRAVENOUS at 18:42

## 2018-10-21 RX ADMIN — VALPROATE SODIUM 500 MG: 100 INJECTION, SOLUTION INTRAVENOUS at 19:23

## 2018-10-21 RX ADMIN — DEXAMETHASONE SODIUM PHOSPHATE 4 MG: 4 INJECTION, SOLUTION INTRAMUSCULAR; INTRAVENOUS at 18:42

## 2018-10-21 RX ADMIN — DEXTROSE MONOHYDRATE 100 ML: 50 INJECTION, SOLUTION INTRAVENOUS at 19:23

## 2018-10-21 RX ADMIN — SODIUM CHLORIDE, POTASSIUM CHLORIDE, SODIUM LACTATE AND CALCIUM CHLORIDE 1000 ML: 600; 310; 30; 20 INJECTION, SOLUTION INTRAVENOUS at 18:42

## 2018-10-21 RX ADMIN — KETOROLAC TROMETHAMINE 30 MG: 30 INJECTION, SOLUTION INTRAMUSCULAR at 18:42

## 2018-10-21 ASSESSMENT — PAIN SCALES - GENERAL
PAINLEVEL_OUTOF10: 9
PAINLEVEL_OUTOF10: 9
PAINLEVEL_OUTOF10: 10

## 2018-10-21 ASSESSMENT — ENCOUNTER SYMPTOMS
TROUBLE SWALLOWING: 0
SORE THROAT: 0
COUGH: 0
VOMITING: 1
ABDOMINAL PAIN: 0
NAUSEA: 1

## 2018-10-21 ASSESSMENT — PAIN DESCRIPTION - FREQUENCY: FREQUENCY: CONTINUOUS

## 2018-10-21 ASSESSMENT — PAIN DESCRIPTION - ONSET: ONSET: ON-GOING

## 2018-10-21 ASSESSMENT — PAIN DESCRIPTION - DESCRIPTORS: DESCRIPTORS: THROBBING

## 2018-10-21 ASSESSMENT — PAIN DESCRIPTION - PAIN TYPE: TYPE: ACUTE PAIN

## 2018-10-21 ASSESSMENT — PAIN DESCRIPTION - LOCATION: LOCATION: HEAD

## 2018-10-21 ASSESSMENT — PAIN DESCRIPTION - PROGRESSION: CLINICAL_PROGRESSION: GRADUALLY WORSENING

## 2018-10-21 NOTE — ED NOTES
Pt has just received several IV meds. Continues to have pain at 8-9.       Navin Jimenez RN  10/21/18 2672

## 2018-10-22 NOTE — ED PROVIDER NOTES
Emergency Department Encounter  Location: [address] [place] [address] [address]    Patient: Sina Riley  MRN: [id number]  : 1965  Date of evaluation: 5793  ED Provider: MD Agustín Valadezalfa 74 was initially seen by Dr. Rin Lara. Please see his/her initial documentation for details of the patient's initial ED presentation, physical exam and completed studies. In brief, Sina Riley is a 46 y.o. male that presented to the emergency department with a migraine headache. He was not signed out to me by Dr. Rin Lara as his discharge was already ordered. However, I became involved in the case because the patient continued to complain of a headache despite orders by Dr. Rin Lara. I have reviewed and interpreted all of the currently available lab results and diagnostics from this visit:  No results found for this visit on 10/21/18. No results found. Final ED Course and MDM: In brief, Sina Riley is a 46 y.o. male whose care was left to me by the outgoing provider. In brief, the patient's headache did not get better with standard medications. Dr. Rin Lara ordered Dilaudid but I cancelled the order per the nurse's expressed concern AND because I was in agreement that it was too soon to give additional medications, especially narcotics, when he hadn't even gotten his original medications that were ordered. I thought it was appropriate to hold the narcotics at that time. However, he continued to complain of pain. I then gave him Depacon. He continued to complain of pain at a 9/10 one hour later. I explained to the patient and his mother that I did not believe in giving narcotics for migraines as it is well established in the medical literature to produce rebound headaches and is NOT an acceptable practice. The patient became angry, ripped off his BP cuff and said he was going to leave.       ED Medication Orders     Start Ordered     Status

## 2018-11-06 ENCOUNTER — TELEPHONE (OUTPATIENT)
Dept: SURGERY | Facility: CLINIC | Age: 53
End: 2018-11-06

## 2018-11-28 ENCOUNTER — HOSPITAL ENCOUNTER (EMERGENCY)
Facility: HOSPITAL | Age: 53
Discharge: HOME | End: 2018-11-28
Attending: FAMILY MEDICINE
Payer: MEDICARE

## 2018-11-28 VITALS
SYSTOLIC BLOOD PRESSURE: 128 MMHG | WEIGHT: 185 LBS | DIASTOLIC BLOOD PRESSURE: 89 MMHG | HEIGHT: 69 IN | TEMPERATURE: 98 F | BODY MASS INDEX: 27.4 KG/M2 | OXYGEN SATURATION: 99 % | RESPIRATION RATE: 16 BRPM | HEART RATE: 90 BPM

## 2018-11-28 DIAGNOSIS — R51.9 NONINTRACTABLE EPISODIC HEADACHE, UNSPECIFIED HEADACHE TYPE: Primary | ICD-10-CM

## 2018-11-28 PROCEDURE — 6370000000 HC RX 637 (ALT 250 FOR IP): Performed by: FAMILY MEDICINE

## 2018-11-28 PROCEDURE — 99283 EMERGENCY DEPT VISIT LOW MDM: CPT

## 2018-11-28 PROCEDURE — 6360000002 HC RX W HCPCS: Performed by: FAMILY MEDICINE

## 2018-11-28 PROCEDURE — 96372 THER/PROPH/DIAG INJ SC/IM: CPT

## 2018-11-28 RX ORDER — KETOROLAC TROMETHAMINE 30 MG/ML
30 INJECTION, SOLUTION INTRAMUSCULAR; INTRAVENOUS ONCE
Status: COMPLETED | OUTPATIENT
Start: 2018-11-28 | End: 2018-11-28

## 2018-11-28 RX ORDER — LORAZEPAM 0.5 MG/1
1 TABLET ORAL ONCE
Status: COMPLETED | OUTPATIENT
Start: 2018-11-28 | End: 2018-11-28

## 2018-11-28 RX ORDER — OXYCODONE AND ACETAMINOPHEN 10; 325 MG/1; MG/1
1 TABLET ORAL ONCE
Status: COMPLETED | OUTPATIENT
Start: 2018-11-28 | End: 2018-11-28

## 2018-11-28 RX ADMIN — OXYCODONE HYDROCHLORIDE AND ACETAMINOPHEN 1 TABLET: 10; 325 TABLET ORAL at 18:10

## 2018-11-28 RX ADMIN — KETOROLAC TROMETHAMINE 30 MG: 30 INJECTION, SOLUTION INTRAMUSCULAR at 18:10

## 2018-11-28 RX ADMIN — LORAZEPAM 1 MG: 0.5 TABLET ORAL at 18:10

## 2018-11-28 ASSESSMENT — ENCOUNTER SYMPTOMS
VOMITING: 0
NAUSEA: 0

## 2018-11-28 ASSESSMENT — PAIN DESCRIPTION - DESCRIPTORS: DESCRIPTORS: ACHING

## 2018-11-28 ASSESSMENT — PAIN DESCRIPTION - LOCATION: LOCATION: HEAD

## 2018-11-28 ASSESSMENT — PAIN DESCRIPTION - PAIN TYPE: TYPE: CHRONIC PAIN;ACUTE PAIN

## 2018-11-28 ASSESSMENT — PAIN SCALES - GENERAL
PAINLEVEL_OUTOF10: 10
PAINLEVEL_OUTOF10: 4

## 2018-11-28 ASSESSMENT — PAIN DESCRIPTION - FREQUENCY: FREQUENCY: CONTINUOUS

## 2018-12-20 ENCOUNTER — TRANSCRIBE ORDERS (OUTPATIENT)
Dept: ADMINISTRATIVE | Facility: HOSPITAL | Age: 53
End: 2018-12-20

## 2018-12-20 DIAGNOSIS — M54.16 LUMBAR RADICULOPATHY: Primary | ICD-10-CM

## 2019-01-19 ENCOUNTER — HOSPITAL ENCOUNTER (EMERGENCY)
Facility: HOSPITAL | Age: 54
Discharge: HOME | End: 2019-01-19
Attending: EMERGENCY MEDICINE
Payer: MEDICARE

## 2019-01-19 VITALS
BODY MASS INDEX: 26.52 KG/M2 | TEMPERATURE: 98.3 F | WEIGHT: 175 LBS | SYSTOLIC BLOOD PRESSURE: 157 MMHG | DIASTOLIC BLOOD PRESSURE: 101 MMHG | OXYGEN SATURATION: 96 % | RESPIRATION RATE: 16 BRPM | HEIGHT: 68 IN | HEART RATE: 106 BPM

## 2019-01-19 DIAGNOSIS — G43.009 MIGRAINE WITHOUT AURA AND WITHOUT STATUS MIGRAINOSUS, NOT INTRACTABLE: Primary | ICD-10-CM

## 2019-01-19 PROCEDURE — 99283 EMERGENCY DEPT VISIT LOW MDM: CPT

## 2019-01-19 PROCEDURE — 6360000002 HC RX W HCPCS: Performed by: EMERGENCY MEDICINE

## 2019-01-19 PROCEDURE — 96372 THER/PROPH/DIAG INJ SC/IM: CPT

## 2019-01-19 RX ORDER — MEPERIDINE HYDROCHLORIDE 25 MG/ML
25 INJECTION INTRAMUSCULAR; INTRAVENOUS; SUBCUTANEOUS ONCE
Status: COMPLETED | OUTPATIENT
Start: 2019-01-19 | End: 2019-01-19

## 2019-01-19 RX ORDER — PROMETHAZINE HYDROCHLORIDE 25 MG/ML
25 INJECTION, SOLUTION INTRAMUSCULAR; INTRAVENOUS ONCE
Status: COMPLETED | OUTPATIENT
Start: 2019-01-19 | End: 2019-01-19

## 2019-01-19 RX ADMIN — MEPERIDINE HYDROCHLORIDE 25 MG: 25 INJECTION INTRAMUSCULAR; INTRAVENOUS; SUBCUTANEOUS at 17:53

## 2019-01-19 RX ADMIN — PROMETHAZINE HYDROCHLORIDE 25 MG: 25 INJECTION INTRAMUSCULAR; INTRAVENOUS at 17:53

## 2019-01-19 ASSESSMENT — ENCOUNTER SYMPTOMS
DIARRHEA: 0
NAUSEA: 1
VOMITING: 0
SINUS PRESSURE: 0
EYE DISCHARGE: 0
WHEEZING: 0
EYE REDNESS: 0
BACK PAIN: 0
EYE PAIN: 0
RHINORRHEA: 0
CONSTIPATION: 0
TROUBLE SWALLOWING: 0
ABDOMINAL PAIN: 0
CHEST TIGHTNESS: 0
COUGH: 0
SORE THROAT: 0
SHORTNESS OF BREATH: 0

## 2019-01-19 ASSESSMENT — PAIN DESCRIPTION - PAIN TYPE: TYPE: CHRONIC PAIN

## 2019-01-19 ASSESSMENT — PAIN DESCRIPTION - LOCATION: LOCATION: HEAD

## 2019-01-19 ASSESSMENT — PAIN DESCRIPTION - FREQUENCY: FREQUENCY: CONTINUOUS

## 2019-01-19 ASSESSMENT — PAIN SCALES - GENERAL: PAINLEVEL_OUTOF10: 10

## 2019-01-19 ASSESSMENT — PAIN DESCRIPTION - DESCRIPTORS: DESCRIPTORS: ACHING

## 2019-01-20 ENCOUNTER — HOSPITAL ENCOUNTER (EMERGENCY)
Facility: HOSPITAL | Age: 54
Discharge: HOME | End: 2019-01-20
Attending: FAMILY MEDICINE
Payer: MEDICARE

## 2019-01-20 VITALS
BODY MASS INDEX: 26.52 KG/M2 | SYSTOLIC BLOOD PRESSURE: 161 MMHG | HEART RATE: 92 BPM | DIASTOLIC BLOOD PRESSURE: 107 MMHG | RESPIRATION RATE: 16 BRPM | HEIGHT: 68 IN | WEIGHT: 175 LBS | TEMPERATURE: 98.5 F | OXYGEN SATURATION: 99 %

## 2019-01-20 DIAGNOSIS — R51.9 NONINTRACTABLE HEADACHE, UNSPECIFIED CHRONICITY PATTERN, UNSPECIFIED HEADACHE TYPE: Primary | ICD-10-CM

## 2019-01-20 PROCEDURE — 6360000002 HC RX W HCPCS: Performed by: FAMILY MEDICINE

## 2019-01-20 PROCEDURE — 96372 THER/PROPH/DIAG INJ SC/IM: CPT

## 2019-01-20 PROCEDURE — 99283 EMERGENCY DEPT VISIT LOW MDM: CPT

## 2019-01-20 RX ORDER — BUTALBITAL, ACETAMINOPHEN AND CAFFEINE 50; 325; 40 MG/1; MG/1; MG/1
1 TABLET ORAL EVERY 4 HOURS PRN
COMMUNITY
End: 2019-03-28

## 2019-01-20 RX ORDER — MEPERIDINE HYDROCHLORIDE 50 MG/ML
75 INJECTION INTRAMUSCULAR; INTRAVENOUS; SUBCUTANEOUS ONCE
Status: COMPLETED | OUTPATIENT
Start: 2019-01-20 | End: 2019-01-20

## 2019-01-20 RX ORDER — PROMETHAZINE HYDROCHLORIDE 25 MG/ML
12.5 INJECTION, SOLUTION INTRAMUSCULAR; INTRAVENOUS ONCE
Status: COMPLETED | OUTPATIENT
Start: 2019-01-20 | End: 2019-01-20

## 2019-01-20 RX ADMIN — PROMETHAZINE HYDROCHLORIDE 12.5 MG: 25 INJECTION INTRAMUSCULAR; INTRAVENOUS at 15:50

## 2019-01-20 RX ADMIN — MEPERIDINE HYDROCHLORIDE 75 MG: 50 INJECTION INTRAMUSCULAR; INTRAVENOUS; SUBCUTANEOUS at 15:50

## 2019-01-20 ASSESSMENT — PAIN DESCRIPTION - DESCRIPTORS: DESCRIPTORS: ACHING

## 2019-01-20 ASSESSMENT — PAIN SCALES - GENERAL: PAINLEVEL_OUTOF10: 10

## 2019-01-20 ASSESSMENT — ENCOUNTER SYMPTOMS
TROUBLE SWALLOWING: 0
SINUS PRESSURE: 0
NAUSEA: 1
VOMITING: 0

## 2019-01-20 ASSESSMENT — PAIN DESCRIPTION - LOCATION: LOCATION: HEAD

## 2019-01-20 ASSESSMENT — PAIN DESCRIPTION - FREQUENCY: FREQUENCY: CONTINUOUS

## 2019-01-20 ASSESSMENT — PAIN DESCRIPTION - PAIN TYPE: TYPE: ACUTE PAIN;CHRONIC PAIN

## 2019-01-28 ENCOUNTER — APPOINTMENT (OUTPATIENT)
Dept: NEUROLOGY | Facility: HOSPITAL | Age: 54
End: 2019-01-28
Attending: ANESTHESIOLOGY

## 2019-03-28 ENCOUNTER — HOSPITAL ENCOUNTER (EMERGENCY)
Facility: HOSPITAL | Age: 54
Discharge: HOME | End: 2019-03-28
Attending: EMERGENCY MEDICINE
Payer: MEDICARE

## 2019-03-28 VITALS
DIASTOLIC BLOOD PRESSURE: 77 MMHG | HEART RATE: 81 BPM | SYSTOLIC BLOOD PRESSURE: 109 MMHG | OXYGEN SATURATION: 95 % | TEMPERATURE: 98.3 F | WEIGHT: 175 LBS | BODY MASS INDEX: 26.52 KG/M2 | RESPIRATION RATE: 18 BRPM | HEIGHT: 68 IN

## 2019-03-28 DIAGNOSIS — R51.9 NONINTRACTABLE HEADACHE, UNSPECIFIED CHRONICITY PATTERN, UNSPECIFIED HEADACHE TYPE: Primary | ICD-10-CM

## 2019-03-28 PROCEDURE — 2580000003 HC RX 258: Performed by: EMERGENCY MEDICINE

## 2019-03-28 PROCEDURE — 96375 TX/PRO/DX INJ NEW DRUG ADDON: CPT

## 2019-03-28 PROCEDURE — 6360000002 HC RX W HCPCS: Performed by: EMERGENCY MEDICINE

## 2019-03-28 PROCEDURE — 96374 THER/PROPH/DIAG INJ IV PUSH: CPT

## 2019-03-28 PROCEDURE — 96361 HYDRATE IV INFUSION ADD-ON: CPT

## 2019-03-28 PROCEDURE — 99283 EMERGENCY DEPT VISIT LOW MDM: CPT

## 2019-03-28 RX ORDER — DEXAMETHASONE SODIUM PHOSPHATE 10 MG/ML
10 INJECTION INTRAMUSCULAR; INTRAVENOUS ONCE
Status: COMPLETED | OUTPATIENT
Start: 2019-03-28 | End: 2019-03-28

## 2019-03-28 RX ORDER — DIPHENHYDRAMINE HYDROCHLORIDE 50 MG/ML
50 INJECTION INTRAMUSCULAR; INTRAVENOUS ONCE
Status: COMPLETED | OUTPATIENT
Start: 2019-03-28 | End: 2019-03-28

## 2019-03-28 RX ORDER — 0.9 % SODIUM CHLORIDE 0.9 %
1000 INTRAVENOUS SOLUTION INTRAVENOUS ONCE
Status: COMPLETED | OUTPATIENT
Start: 2019-03-28 | End: 2019-03-28

## 2019-03-28 RX ORDER — METOCLOPRAMIDE HYDROCHLORIDE 5 MG/ML
10 INJECTION INTRAMUSCULAR; INTRAVENOUS ONCE
Status: COMPLETED | OUTPATIENT
Start: 2019-03-28 | End: 2019-03-28

## 2019-03-28 RX ADMIN — DEXAMETHASONE SODIUM PHOSPHATE 10 MG: 10 INJECTION INTRAMUSCULAR; INTRAVENOUS at 10:41

## 2019-03-28 RX ADMIN — SODIUM CHLORIDE 1000 ML: 9 INJECTION, SOLUTION INTRAVENOUS at 10:38

## 2019-03-28 RX ADMIN — DIPHENHYDRAMINE HYDROCHLORIDE 50 MG: 50 INJECTION, SOLUTION INTRAMUSCULAR; INTRAVENOUS at 10:41

## 2019-03-28 RX ADMIN — METOCLOPRAMIDE 10 MG: 5 INJECTION, SOLUTION INTRAMUSCULAR; INTRAVENOUS at 10:40

## 2019-03-28 ASSESSMENT — PAIN SCALES - GENERAL: PAINLEVEL_OUTOF10: 8

## 2019-03-28 ASSESSMENT — PAIN DESCRIPTION - DESCRIPTORS: DESCRIPTORS: HEADACHE

## 2019-03-28 ASSESSMENT — PAIN DESCRIPTION - LOCATION: LOCATION: HEAD

## 2019-03-28 ASSESSMENT — PAIN DESCRIPTION - PAIN TYPE: TYPE: ACUTE PAIN

## 2019-03-28 NOTE — ED PROVIDER NOTES
[address] ENCOUNTER      Pt Name: Thelma Baron  MRN: [id number]  YOB: 1965  Date of evaluation: 3/28/2019  Provider: En Iqbal DO    CHIEF COMPLAINT       Chief Complaint   Patient presents with    Headache         HISTORY OF PRESENT ILLNESS  (Location/Symptom, Timing/Onset, Context/Setting, Quality, Duration, Modifying Factors, Severity.)   Thelma Baron is a 46 y.o. male who presents to the emergency department for evaluation of a headache over the last few days, gradual in onset but is progressively worsening. Has a headache history including migraines couple times a week. Takes Imitrex but has not helped with this current headache. No falls, injury or head trauma, no neck stiffness, fevers or chills or vision changes. Denies any other abnormalities compared to his basic migrainous-type headaches. He has received Botox injections in the past with limited relief. Denies any other acute systemic complaints this time. No chest pain or difficulty breathing, recent illness. Nursing notes were reviewed. REVIEW OFSYSTEMS    (2-9 systems for level 4, 10 or more for level 5)   ROS:  General:  No fevers, no chills, no weakness  Cardiovascular:  No chest pain, no palpitations  Respiratory:  No shortness of breath, no cough, no wheezing  Gastrointestinal:  No pain, no nausea, no vomiting, no diarrhea  Musculoskeletal:  No muscle pain, no joint pain  Skin:  No rash, no easy bruising  Neurologic:  No speech problems, + headache, no extremity weakness  Psychiatric:  No anxiety  Genitourinary:  No dysuria, no hematuria    Except as noted above the remainder of the review of systems was reviewed and negative.        PAST MEDICAL HISTORY     Past Medical History:   Diagnosis Date    Asthma     Cancer ([facility] [address]     melanoma on back    Chronic back pain     Depression     Elevated liver enzymes     Erectile dysfunction     Headache     Hyperlipidemia     Hypertension     Hypogonadism     Melanoma ([facility] [address]     Memory loss     Type II or unspecified type diabetes mellitus without mention of complication, not stated as uncontrolled          SURGICAL HISTORY       Past Surgical History:   Procedure Laterality Date    SKIN CANCER EXCISION      back    TONSILLECTOMY           CURRENT MEDICATIONS       Previous Medications    ASPIRIN 81 MG EC TABLET    Take 81 mg by mouth daily. BUPROPION (WELLBUTRIN) 75 MG TABLET    Take 1 tablet by mouth 2 times daily. DULOXETINE (CYMBALTA) 60 MG CAPSULE    Take 1 capsule by mouth daily. GLUCOSE BLOOD VI TEST STRIPS (JALIL CONTOUR NEXT TEST) STRIP    1 each by Does not apply route daily. As needed. METFORMIN (GLUCOPHAGE) 1000 MG TABLET    Take 1 tablet by mouth 2 times daily (with meals). PROPRANOLOL (INDERAL) 20 MG TABLET    take 1 tablet by mouth twice a day    SUMATRIPTAN (IMITREX) 100 MG TABLET    Take 100 mg by mouth once as needed for Migraine       ALLERGIES     Patient has no known allergies.     FAMILY HISTORY       Family History   Problem Relation Age of Onset    Cancer Father     High Blood Pressure Father           SOCIAL HISTORY       Social History     Socioeconomic History    Marital status: Single     Spouse name: None    Number of children: None    Years of education: None    Highest education level: None   Occupational History    None   Social Needs    Financial resource strain: None    Food insecurity:     Worry: None     Inability: None    Transportation needs:     Medical: None     Non-medical: None   Tobacco Use    Smoking status: Never Smoker    Smokeless tobacco: Never Used   Substance and Sexual Activity    Alcohol use: No     Comment: quit 5 years ago    Drug use: No    Sexual activity: None   Lifestyle    Physical activity:     Days per week: None     Minutes per session: None    Stress: None   Relationships    Social connections:     Talks on phone: None     Gets together: None     Attends Rastafarian service: None     Active member of club or organization: None     Attends meetings of clubs or organizations: None     Relationship status: None    Intimate partner violence:     Fear of current or ex partner: None     Emotionally abused: None     Physically abused: None     Forced sexual activity: None   Other Topics Concern    None   Social History Narrative    None         PHYSICAL EXAM    (up to 7 for level 4, 8 or more for level 5)     ED Triage Vitals [03/28/19 1010]   BP Temp Temp Source Pulse Resp SpO2 Height Weight   134/76 98.3 °F (36.8 °C) Oral 86 18 96 % 5' 8\" (1.727 m) 175 lb (79.4 kg)       Physical Exam  General :Patient is awake, alert, oriented, in no acute distress, nontoxic appearing  HEENT: Pupils are equally round and reactive to light, EOMI, conjunctivae clear. Oral mucosa is moist, no exudate. Uvula is midline  Neck: Neck is supple, full range of motion, trachea midline  Cardiac: Heart regular rate, rhythm, no murmurs, rubs, or gallops  Lungs: Lungs are clear to auscultation, there is no wheezing, rhonchi, or rales. There is no use of accessory muscles. Chest wall: There is no tenderness to palpation over the chest wall or over ribs  Abdomen: Abdomen is soft, nontender, nondistended. There is no firm or pulsatile masses, no rebound rigidity or guarding. Musculoskeletal: 5 out of 5 strength in all 4 extremities. No focal muscle deficits are appreciated  Neuro: GCS 15, no focal neurological deficit. Motor intact, sensory intact, level of consciousness is normal, cerebellar function is normal, reflexes are grossly normal.   Dermatology: Skin is warm and dry  Psych: Mentation is grossly normal, cognition is grossly normal. Affect is appropriate.       DIAGNOSTIC RESULTS     EKG: All EKG's are interpreted by the Emergency Department Physician who either signs or Co-signs this chart in the absenceof a cardiologist.        RADIOLOGY:   Non-plain film images such as CT, Ultrasound and MRI are read by the radiologist. Plain radiographic images are visualized and preliminarily interpreted by the emergency physician with the below findings:      ? Radiologist's Report Reviewed:  No orders to display         ED BEDSIDE ULTRASOUND:   Performed by ED Physician - none    LABS:    I have reviewed and interpreted all of the currently available lab results from this visit (ifapplicable):  No results found for this visit on 03/28/19. All other labs were within normal range or not returned as of this dictation. EMERGENCY DEPARTMENT COURSE and DIFFERENTIAL DIAGNOSIS/MDM:   Vitals:    Vitals:    03/28/19 1010   BP: 134/76   Pulse: 86   Resp: 18   Temp: 98.3 °F (36.8 °C)   TempSrc: Oral   SpO2: 96%   Weight: 175 lb (79.4 kg)   Height: 5' 8\" (1.727 m)       MEDICATIONS ADMINISTERED IN ED:  Medications   0.9 % sodium chloride bolus (1,000 mLs Intravenous New Bag 3/28/19 1038)   dexamethasone (DECADRON) injection 10 mg (10 mg Intravenous Given 3/28/19 1041)   diphenhydrAMINE (BENADRYL) injection 50 mg (50 mg Intravenous Given 3/28/19 1041)   metoclopramide (REGLAN) injection 10 mg (10 mg Intravenous Given 3/28/19 1040)       Patient with a migrainous headache over the last few days. Does not appear acutely ill or toxic, vital signs are stable, GCS 15, no focal neurological deficit. We discussed obtaining IV, labs, Benadryl, Reglan, Decadron, as well as IV fluids. On reevaluation at 11:11 AM the patient is feeling much better, headache has improved. We discussed symptomatic therapies at home continuing with his headache medications, good fluids for rehydration as well as rest, follow with his PCP as well as his neurologist. The patient will follow-up with their PCP in 1-2 days for reevaluation.   If the patient or family members have anyfurther concerns or any worsening symptoms they will return to the ED for reevaluation. CONSULTS:  None    PROCEDURES:  Procedures    CRITICAL CARE TIME    Total Critical Care time was 0 minutes, excluding separately reportable procedures. There was a high probability of clinically significant/life threatening deterioration in the patient's condition which required my urgent intervention. FINAL IMPRESSION      1. Nonintractable headache, unspecified chronicity pattern, unspecified headache type Improving         DISPOSITION/PLAN   DISPOSITION Decision To Discharge 03/28/2019 11:12:05 AM      PATIENT REFERRED TO:  AUGIE Pineda CNP  [address]  [phone]    In 2 days      Your neurologist    Schedule an appointment as soon as possible for a visit       [facility]  [facility] [address] [facility]  [phone]    If symptoms worsen      DISCHARGE MEDICATIONS:  New Prescriptions    No medications on file       Comment: Please note this report has been produced using speech recognition software and may contain errorsrelated to that system including errors in grammar, punctuation, and spelling, as well as words and phrases that may be inappropriate. If there are any questions or concerns please feel free to contact the dictating providerfor clarification.     Archie Pantoja DO  Attending Emergency Physician                Archie Pantoja DO  03/28/19 1111

## 2019-03-28 NOTE — ED NOTES
DC instruction reviewed with Pt , verbalized understanding. Pt  denies any further questions or needs at this time. Prescription(s) and Written instruction given to Pt. No distress noted on DC. Pt refused WC, Pt ambulated without difficulty out of ED, with father and belongings.         Lucretia Peña RN  03/28/19 5418

## 2019-03-28 NOTE — ED TRIAGE NOTES
Pt reports history of chronic Migraines, and back pain. Pt reports he started having a HA this past week, and has gradually worsened. Pt reports work up this am \"I cough feel my heart beating in my head\". Pt denies any other complaints. Pt denies any new or changed distal pain or numbness. Pt A/Ox4.

## 2019-05-19 ENCOUNTER — HOSPITAL ENCOUNTER (EMERGENCY)
Facility: HOSPITAL | Age: 54
Discharge: HOME | End: 2019-05-19
Attending: EMERGENCY MEDICINE
Payer: MEDICARE

## 2019-05-19 VITALS
DIASTOLIC BLOOD PRESSURE: 87 MMHG | BODY MASS INDEX: 27.28 KG/M2 | TEMPERATURE: 98.9 F | OXYGEN SATURATION: 95 % | HEART RATE: 103 BPM | HEIGHT: 68 IN | RESPIRATION RATE: 16 BRPM | SYSTOLIC BLOOD PRESSURE: 132 MMHG | WEIGHT: 180 LBS

## 2019-05-19 DIAGNOSIS — R51.9 NONINTRACTABLE HEADACHE, UNSPECIFIED CHRONICITY PATTERN, UNSPECIFIED HEADACHE TYPE: Primary | ICD-10-CM

## 2019-05-19 LAB
A/G RATIO: 1.6 (ref 0.8–2)
ALBUMIN SERPL-MCNC: 4.6 G/DL (ref 3.4–4.8)
ALP BLD-CCNC: 41 U/L (ref 25–100)
ALT SERPL-CCNC: 17 U/L (ref 4–36)
ANION GAP SERPL CALCULATED.3IONS-SCNC: 13 MMOL/L (ref 3–16)
AST SERPL-CCNC: 16 U/L (ref 8–33)
BASOPHILS ABSOLUTE: 0.1 K/UL (ref 0–0.1)
BASOPHILS RELATIVE PERCENT: 0.9 %
BILIRUB SERPL-MCNC: <0.2 MG/DL (ref 0.3–1.2)
BUN BLDV-MCNC: 11 MG/DL (ref 6–20)
CALCIUM SERPL-MCNC: 9.8 MG/DL (ref 8.5–10.5)
CHLORIDE BLD-SCNC: 99 MMOL/L (ref 98–107)
CO2: 25 MMOL/L (ref 20–30)
CREAT SERPL-MCNC: 1.1 MG/DL (ref 0.4–1.2)
EOSINOPHILS ABSOLUTE: 0.2 K/UL (ref 0–0.4)
EOSINOPHILS RELATIVE PERCENT: 2.3 %
GFR AFRICAN AMERICAN: >59
GFR NON-AFRICAN AMERICAN: >59
GLOBULIN: 2.9 G/DL
GLUCOSE BLD-MCNC: 124 MG/DL (ref 74–106)
HCT VFR BLD CALC: 37.4 % (ref 40–54)
HEMOGLOBIN: 12.7 G/DL (ref 13–18)
IMMATURE GRANULOCYTES #: 0 K/UL
IMMATURE GRANULOCYTES %: 0.5 % (ref 0–5)
LYMPHOCYTES ABSOLUTE: 2.9 K/UL (ref 1.5–4)
LYMPHOCYTES RELATIVE PERCENT: 37.1 %
MCH RBC QN AUTO: 30 PG (ref 27–32)
MCHC RBC AUTO-ENTMCNC: 34 G/DL (ref 31–35)
MCV RBC AUTO: 88.2 FL (ref 80–100)
MONOCYTES ABSOLUTE: 0.6 K/UL (ref 0.2–0.8)
MONOCYTES RELATIVE PERCENT: 7.3 %
NEUTROPHILS ABSOLUTE: 4 K/UL (ref 2–7.5)
NEUTROPHILS RELATIVE PERCENT: 51.9 %
PDW BLD-RTO: 12.6 % (ref 11–16)
PLATELET # BLD: 310 K/UL (ref 150–400)
PMV BLD AUTO: 9.1 FL (ref 6–10)
POTASSIUM REFLEX MAGNESIUM: 4 MMOL/L (ref 3.4–5.1)
RBC # BLD: 4.24 M/UL (ref 4.5–6)
SODIUM BLD-SCNC: 137 MMOL/L (ref 136–145)
TOTAL PROTEIN: 7.5 G/DL (ref 6.4–8.3)
WBC # BLD: 7.7 K/UL (ref 4–11)

## 2019-05-19 PROCEDURE — 99283 EMERGENCY DEPT VISIT LOW MDM: CPT

## 2019-05-19 PROCEDURE — 6360000002 HC RX W HCPCS: Performed by: EMERGENCY MEDICINE

## 2019-05-19 PROCEDURE — 80053 COMPREHEN METABOLIC PANEL: CPT

## 2019-05-19 PROCEDURE — 96375 TX/PRO/DX INJ NEW DRUG ADDON: CPT

## 2019-05-19 PROCEDURE — 96374 THER/PROPH/DIAG INJ IV PUSH: CPT

## 2019-05-19 PROCEDURE — 2580000003 HC RX 258: Performed by: EMERGENCY MEDICINE

## 2019-05-19 PROCEDURE — 36415 COLL VENOUS BLD VENIPUNCTURE: CPT

## 2019-05-19 PROCEDURE — 85025 COMPLETE CBC W/AUTO DIFF WBC: CPT

## 2019-05-19 RX ORDER — 0.9 % SODIUM CHLORIDE 0.9 %
1000 INTRAVENOUS SOLUTION INTRAVENOUS ONCE
Status: COMPLETED | OUTPATIENT
Start: 2019-05-19 | End: 2019-05-19

## 2019-05-19 RX ORDER — DIPHENHYDRAMINE HYDROCHLORIDE 50 MG/ML
50 INJECTION INTRAMUSCULAR; INTRAVENOUS ONCE
Status: COMPLETED | OUTPATIENT
Start: 2019-05-19 | End: 2019-05-19

## 2019-05-19 RX ORDER — METOCLOPRAMIDE HYDROCHLORIDE 5 MG/ML
10 INJECTION INTRAMUSCULAR; INTRAVENOUS ONCE
Status: COMPLETED | OUTPATIENT
Start: 2019-05-19 | End: 2019-05-19

## 2019-05-19 RX ORDER — DEXAMETHASONE SODIUM PHOSPHATE 10 MG/ML
10 INJECTION INTRAMUSCULAR; INTRAVENOUS ONCE
Status: COMPLETED | OUTPATIENT
Start: 2019-05-19 | End: 2019-05-19

## 2019-05-19 RX ADMIN — DIPHENHYDRAMINE HYDROCHLORIDE 50 MG: 50 INJECTION, SOLUTION INTRAMUSCULAR; INTRAVENOUS at 16:31

## 2019-05-19 RX ADMIN — DEXAMETHASONE SODIUM PHOSPHATE 10 MG: 10 INJECTION INTRAMUSCULAR; INTRAVENOUS at 16:31

## 2019-05-19 RX ADMIN — SODIUM CHLORIDE 1000 ML: 9 INJECTION, SOLUTION INTRAVENOUS at 16:32

## 2019-05-19 RX ADMIN — METOCLOPRAMIDE 10 MG: 5 INJECTION, SOLUTION INTRAMUSCULAR; INTRAVENOUS at 16:31

## 2019-05-19 ASSESSMENT — PAIN DESCRIPTION - FREQUENCY: FREQUENCY: CONTINUOUS

## 2019-05-19 ASSESSMENT — PAIN DESCRIPTION - PAIN TYPE: TYPE: ACUTE PAIN

## 2019-05-19 ASSESSMENT — PAIN DESCRIPTION - DESCRIPTORS: DESCRIPTORS: ACHING;THROBBING

## 2019-05-19 ASSESSMENT — PAIN SCALES - GENERAL: PAINLEVEL_OUTOF10: 8

## 2019-05-19 ASSESSMENT — PAIN DESCRIPTION - ORIENTATION: ORIENTATION: LEFT

## 2019-05-19 ASSESSMENT — PAIN DESCRIPTION - LOCATION: LOCATION: HEAD

## 2019-05-19 NOTE — ED PROVIDER NOTES
per session: None    Stress: None   Relationships    Social connections:     Talks on phone: None     Gets together: None     Attends Caodaism service: None     Active member of club or organization: None     Attends meetings of clubs or organizations: None     Relationship status: None    Intimate partner violence:     Fear of current or ex partner: None     Emotionally abused: None     Physically abused: None     Forced sexual activity: None   Other Topics Concern    None   Social History Narrative    None         PHYSICAL EXAM    (up to 7 for level 4, 8 or more for level 5)     ED Triage Vitals   BP Temp Temp src Pulse Resp SpO2 Height Weight   -- -- -- -- -- -- -- --       Physical Exam  General :Patient is awake, alert, oriented, in no acute distress, nontoxic appearing  HEENT: Pupils are equally round and reactive to light, EOMI, conjunctivae clear. Oral mucosa is moist, no exudate. Uvula is midline  Neck: Neck is supple, full range of motion, trachea midline  Cardiac: Heart regular rate, rhythm, no murmurs, rubs, or gallops  Lungs: Lungs are clear to auscultation, there is no wheezing, rhonchi, or rales. There is no use of accessory muscles. Chest wall: There is no tenderness to palpation over the chest wall or over ribs  Abdomen: Abdomen is soft, nontender, nondistended. There is no firm or pulsatile masses, no rebound rigidity or guarding. Musculoskeletal: 5 out of 5 strength in all 4 extremities. No focal muscle deficits are appreciated  Neuro: GCS 15, no focal neurological deficit, cranial nerves are intact. Patient is ambulatory. Motor intact, sensory intact, level of consciousness is normal, cerebellar function is normal, reflexes are grossly normal.  Dermatology: Skin is warm and dry  Psych: Mentation is grossly normal, cognition is grossly normal. Affect is appropriate.       DIAGNOSTIC RESULTS     EKG: All EKG's are interpreted by the Emergency Department Physician who either signs or Co-signs this chart in the absenceof a cardiologist.        RADIOLOGY:   Non-plain film images such as CT, Ultrasound and MRI are read by the radiologist. Plain radiographic images are visualized and preliminarily interpreted by the emergency physician with the below findings:      ? Radiologist's Report Reviewed:  No orders to display         ED BEDSIDE ULTRASOUND:   Performed by ED Physician - none    LABS:    I have reviewed and interpreted all of the currently available lab results from this visit (ifapplicable):  Results for orders placed or performed during the hospital encounter of 05/19/19   CBC Auto Differential   Result Value Ref Range    WBC 7.7 4.0 - 11.0 K/uL    RBC 4.24 (L) 4.50 - 6.00 M/uL    Hemoglobin 12.7 (L) 13.0 - 18.0 g/dL    Hematocrit 37.4 (L) 40.0 - 54.0 %    MCV 88.2 80.0 - 100.0 fL    MCH 30.0 27.0 - 32.0 pg    MCHC 34.0 31.0 - 35.0 g/dL    RDW 12.6 11.0 - 16.0 %    Platelets 401 256 - 789 K/uL    MPV 9.1 6.0 - 10.0 fL    Neutrophils % 51.9 %    Immature Granulocytes % 0.5 0.0 - 5.0 %    Lymphocytes % 37.1 %    Monocytes % 7.3 %    Eosinophils % 2.3 %    Basophils % 0.9 %    Neutrophils # 4.0 2.0 - 7.5 K/uL    Immature Granulocytes # 0.0 K/uL    Lymphocytes # 2.9 1.5 - 4.0 K/uL    Monocytes # 0.6 0.2 - 0.8 K/uL    Eosinophils # 0.2 0.0 - 0.4 K/uL    Basophils # 0.1 0.0 - 0.1 K/uL   Comprehensive Metabolic Panel w/ Reflex to MG   Result Value Ref Range    Sodium 137 136 - 145 mmol/L    Potassium reflex Magnesium 4.0 3.4 - 5.1 mmol/L    Chloride 99 98 - 107 mmol/L    CO2 25 20 - 30 mmol/L    Anion Gap 13 3 - 16    Glucose 124 (H) 74 - 106 mg/dL    BUN 11 6 - 20 mg/dL    CREATININE 1.1 0.4 - 1.2 mg/dL    GFR Non-African American >59 >59    GFR African American >59 >59    Calcium 9.8 8.5 - 10.5 mg/dL    Total Protein 7.5 6.4 - 8.3 g/dL    Alb 4.6 3.4 - 4.8 g/dL    Albumin/Globulin Ratio 1.6 0.8 - 2.0    Total Bilirubin <0.2 (L) 0.3 - 1.2 mg/dL    Alkaline Phosphatase 41 25 - 100 U/L    ALT 17 [address] [facility]  [facility]  [phone]    If symptoms worsen      DISCHARGE MEDICATIONS:  New Prescriptions    No medications on file       Comment: Please note this report has been produced using speech recognition software and may contain errorsrelated to that system including errors in grammar, punctuation, and spelling, as well as words and phrases that may be inappropriate. If there are any questions or concerns please feel free to contact the dictating providerfor clarification.     Aayush Cantu DO  Attending Emergency Physician              Aayush Cantu DO  05/19/19 9945

## 2019-06-26 ENCOUNTER — HOSPITAL ENCOUNTER (EMERGENCY)
Facility: HOSPITAL | Age: 54
Discharge: HOME | End: 2019-06-26
Attending: EMERGENCY MEDICINE
Payer: MEDICARE

## 2019-06-26 VITALS
WEIGHT: 178 LBS | OXYGEN SATURATION: 92 % | SYSTOLIC BLOOD PRESSURE: 119 MMHG | HEART RATE: 100 BPM | TEMPERATURE: 98.7 F | RESPIRATION RATE: 16 BRPM | BODY MASS INDEX: 26.98 KG/M2 | HEIGHT: 68 IN | DIASTOLIC BLOOD PRESSURE: 87 MMHG

## 2019-06-26 DIAGNOSIS — G43.909 MIGRAINE WITHOUT STATUS MIGRAINOSUS, NOT INTRACTABLE, UNSPECIFIED MIGRAINE TYPE: Primary | ICD-10-CM

## 2019-06-26 PROCEDURE — 99283 EMERGENCY DEPT VISIT LOW MDM: CPT

## 2019-06-26 PROCEDURE — 6360000002 HC RX W HCPCS: Performed by: EMERGENCY MEDICINE

## 2019-06-26 PROCEDURE — 96374 THER/PROPH/DIAG INJ IV PUSH: CPT

## 2019-06-26 PROCEDURE — 96372 THER/PROPH/DIAG INJ SC/IM: CPT

## 2019-06-26 RX ORDER — PROMETHAZINE HYDROCHLORIDE 25 MG/ML
25 INJECTION, SOLUTION INTRAMUSCULAR; INTRAVENOUS ONCE
Status: COMPLETED | OUTPATIENT
Start: 2019-06-26 | End: 2019-06-26

## 2019-06-26 RX ORDER — CYCLOBENZAPRINE HCL 10 MG
10 TABLET ORAL 3 TIMES DAILY PRN
COMMUNITY
End: 2020-04-27

## 2019-06-26 RX ORDER — MEPERIDINE HYDROCHLORIDE 25 MG/ML
25 INJECTION INTRAMUSCULAR; INTRAVENOUS; SUBCUTANEOUS ONCE
Status: COMPLETED | OUTPATIENT
Start: 2019-06-26 | End: 2019-06-26

## 2019-06-26 RX ORDER — OXYCODONE AND ACETAMINOPHEN 7.5; 325 MG/1; MG/1
1 TABLET ORAL EVERY 8 HOURS PRN
COMMUNITY
End: 2019-09-12

## 2019-06-26 RX ADMIN — PROMETHAZINE HYDROCHLORIDE 25 MG: 25 INJECTION INTRAMUSCULAR; INTRAVENOUS at 19:29

## 2019-06-26 RX ADMIN — MEPERIDINE HYDROCHLORIDE 25 MG: 25 INJECTION INTRAMUSCULAR; INTRAVENOUS; SUBCUTANEOUS at 19:29

## 2019-06-26 ASSESSMENT — ENCOUNTER SYMPTOMS
SINUS PRESSURE: 0
NAUSEA: 1
CONSTIPATION: 0
WHEEZING: 0
COUGH: 0
PHOTOPHOBIA: 1
EYE REDNESS: 0
SORE THROAT: 0
SHORTNESS OF BREATH: 0
RHINORRHEA: 0
EYE DISCHARGE: 0
BACK PAIN: 0
VOMITING: 0
ABDOMINAL PAIN: 0
EYE PAIN: 0
TROUBLE SWALLOWING: 0
DIARRHEA: 0
CHEST TIGHTNESS: 0

## 2019-06-26 ASSESSMENT — PAIN DESCRIPTION - FREQUENCY: FREQUENCY: CONTINUOUS

## 2019-06-26 ASSESSMENT — PAIN DESCRIPTION - DESCRIPTORS: DESCRIPTORS: ACHING

## 2019-06-26 ASSESSMENT — PAIN SCALES - GENERAL
PAINLEVEL_OUTOF10: 9
PAINLEVEL_OUTOF10: 9

## 2019-06-26 ASSESSMENT — PAIN DESCRIPTION - PAIN TYPE: TYPE: ACUTE PAIN

## 2019-06-26 ASSESSMENT — PAIN DESCRIPTION - LOCATION: LOCATION: HEAD

## 2019-06-26 NOTE — ED PROVIDER NOTES
Hypertension     Hypogonadism     Melanoma ([facility] [address]     Memory loss     S/P Botox injection     neck x12, face x6    Type II or unspecified type diabetes mellitus without mention of complication, not stated as uncontrolled          SURGICAL HISTORY       Past Surgical History:   Procedure Laterality Date    SKIN CANCER EXCISION      back    TONSILLECTOMY           CURRENT MEDICATIONS       Previous Medications    ASPIRIN 81 MG EC TABLET    Take 81 mg by mouth daily. BUPROPION (WELLBUTRIN) 75 MG TABLET    Take 1 tablet by mouth 2 times daily. CYCLOBENZAPRINE (FLEXERIL) 10 MG TABLET    Take 10 mg by mouth 3 times daily as needed for Muscle spasms    DULOXETINE (CYMBALTA) 60 MG CAPSULE    Take 1 capsule by mouth daily. GLUCOSE BLOOD VI TEST STRIPS (JALIL CONTOUR NEXT TEST) STRIP    1 each by Does not apply route daily. As needed. METFORMIN (GLUCOPHAGE) 1000 MG TABLET    Take 1 tablet by mouth 2 times daily (with meals). OXYCODONE-ACETAMINOPHEN (PERCOCET) 7.5-325 MG PER TABLET    Take 1 tablet by mouth every 8 hours as needed for Pain. PROPRANOLOL (INDERAL) 20 MG TABLET    take 1 tablet by mouth twice a day    SUMATRIPTAN (IMITREX) 100 MG TABLET    Take 100 mg by mouth once as needed for Migraine       ALLERGIES     Patient has no known allergies.     FAMILY HISTORY       Family History   Problem Relation Age of Onset    Cancer Father     High Blood Pressure Father           SOCIAL HISTORY       Social History     Socioeconomic History    Marital status: Single     Spouse name: None    Number of children: None    Years of education: None    Highest education level: None   Occupational History    None   Social Needs    Financial resource strain: None    Food insecurity:     Worry: None     Inability: None    Transportation needs:     Medical: None     Non-medical: None   Tobacco Use    Smoking status: Never Smoker    Smokeless tobacco: Never Used   Substance and Sexual Activity    MRI are read by the radiologist. Plain radiographic images are visualized andpreliminarily interpreted by the emergency physician with the below findings:        Interpretationper the Radiologist below, if available at the time of this note:    No orders to display         ED BEDSIDE ULTRASOUND:   Performed by ED Physician - none    LABS:  Labs Reviewed - No data to display    All other labs were within normal range or not returned as of this dictation. EMERGENCY DEPARTMENT COURSE and DIFFERENTIAL DIAGNOSIS/MDM:   Vitals:    Vitals:    06/26/19 1918   BP: (!) 141/84   Pulse: 98   Resp: 16   Temp: 98.7 °F (37.1 °C)   TempSrc: Oral   SpO2: 95%   Weight: 178 lb (80.7 kg)   Height: 5' 8\" (1.727 m)           CRITICAL CARE TIME   Total Critical Care time was  minutes, excluding separatelyreportable procedures. There was a high probability ofclinically significant/life threatening deterioration in the patient's condition which required my urgent intervention. CONSULTS:  None    PROCEDURES:  None    PROGRESS NOTES:        FINAL IMPRESSION      1.  Migraine without status migrainosus, not intractable, unspecified migraine type          Final diagnoses:   Migraine without status migrainosus, not intractable, unspecified migraine type       DISPOSITION/PLAN   DISPOSITION Decision To Discharge 06/26/2019 07:25:55 PM      PATIENT REFERRED TO:  AUGIE Wong - CNP  [address]  [phone]      If symptoms worsen      DISCHARGE MEDICATIONS:  New Prescriptions    No medications on file         (Please note thatportions of this note may have been completed with a voice recognition program.  Efforts were [facility] edit the dictations but occasionally words are mis-transcribed.)    Carmen Dash MD (electronically signed)  Attending Emergency Physician        Mila Lucero MD  06/26/19 5377

## 2019-06-26 NOTE — ED NOTES
Patient with c/o headache for a couple of days, patient has history of headaches. Patient had 18 injections of botox, 12 in neck and 6 in face several months ago.      Ricky Dsouza RN  06/26/19 1177

## 2019-06-27 NOTE — ED NOTES
Dc instructions given to patient, patient headache improved at this time, no other questions or concerns.      Sandra Shepard RN  06/26/19 2000

## 2019-07-15 ENCOUNTER — HOSPITAL ENCOUNTER (EMERGENCY)
Facility: HOSPITAL | Age: 54
Discharge: HOME | End: 2019-07-15
Attending: EMERGENCY MEDICINE
Payer: MEDICARE

## 2019-07-15 VITALS
SYSTOLIC BLOOD PRESSURE: 151 MMHG | BODY MASS INDEX: 27.28 KG/M2 | RESPIRATION RATE: 20 BRPM | HEIGHT: 68 IN | TEMPERATURE: 97.6 F | HEART RATE: 85 BPM | DIASTOLIC BLOOD PRESSURE: 98 MMHG | OXYGEN SATURATION: 97 % | WEIGHT: 180 LBS

## 2019-07-15 DIAGNOSIS — G43.909 MIGRAINE WITHOUT STATUS MIGRAINOSUS, NOT INTRACTABLE, UNSPECIFIED MIGRAINE TYPE: Primary | ICD-10-CM

## 2019-07-15 PROCEDURE — 96361 HYDRATE IV INFUSION ADD-ON: CPT

## 2019-07-15 PROCEDURE — 2580000003 HC RX 258: Performed by: EMERGENCY MEDICINE

## 2019-07-15 PROCEDURE — 6370000000 HC RX 637 (ALT 250 FOR IP): Performed by: EMERGENCY MEDICINE

## 2019-07-15 PROCEDURE — 6360000002 HC RX W HCPCS: Performed by: EMERGENCY MEDICINE

## 2019-07-15 PROCEDURE — 96374 THER/PROPH/DIAG INJ IV PUSH: CPT

## 2019-07-15 PROCEDURE — 96375 TX/PRO/DX INJ NEW DRUG ADDON: CPT

## 2019-07-15 PROCEDURE — 99283 EMERGENCY DEPT VISIT LOW MDM: CPT

## 2019-07-15 RX ORDER — KETOROLAC TROMETHAMINE 30 MG/ML
30 INJECTION, SOLUTION INTRAMUSCULAR; INTRAVENOUS ONCE
Status: COMPLETED | OUTPATIENT
Start: 2019-07-15 | End: 2019-07-15

## 2019-07-15 RX ORDER — DIPHENHYDRAMINE HYDROCHLORIDE 50 MG/ML
50 INJECTION INTRAMUSCULAR; INTRAVENOUS ONCE
Status: COMPLETED | OUTPATIENT
Start: 2019-07-15 | End: 2019-07-15

## 2019-07-15 RX ORDER — DEXAMETHASONE SODIUM PHOSPHATE 10 MG/ML
10 INJECTION INTRAMUSCULAR; INTRAVENOUS ONCE
Status: COMPLETED | OUTPATIENT
Start: 2019-07-15 | End: 2019-07-15

## 2019-07-15 RX ORDER — DIAZEPAM 5 MG/1
10 TABLET ORAL ONCE
Status: COMPLETED | OUTPATIENT
Start: 2019-07-15 | End: 2019-07-15

## 2019-07-15 RX ORDER — 0.9 % SODIUM CHLORIDE 0.9 %
1000 INTRAVENOUS SOLUTION INTRAVENOUS ONCE
Status: COMPLETED | OUTPATIENT
Start: 2019-07-15 | End: 2019-07-15

## 2019-07-15 RX ORDER — METOCLOPRAMIDE HYDROCHLORIDE 5 MG/ML
10 INJECTION INTRAMUSCULAR; INTRAVENOUS ONCE
Status: COMPLETED | OUTPATIENT
Start: 2019-07-15 | End: 2019-07-15

## 2019-07-15 RX ADMIN — SODIUM CHLORIDE 1000 ML: 9 INJECTION, SOLUTION INTRAVENOUS at 19:18

## 2019-07-15 RX ADMIN — DEXAMETHASONE SODIUM PHOSPHATE 10 MG: 10 INJECTION INTRAMUSCULAR; INTRAVENOUS at 19:17

## 2019-07-15 RX ADMIN — DIAZEPAM 10 MG: 5 TABLET ORAL at 19:33

## 2019-07-15 RX ADMIN — KETOROLAC TROMETHAMINE 30 MG: 30 INJECTION, SOLUTION INTRAMUSCULAR at 19:17

## 2019-07-15 RX ADMIN — DIPHENHYDRAMINE HYDROCHLORIDE 50 MG: 50 INJECTION, SOLUTION INTRAMUSCULAR; INTRAVENOUS at 19:17

## 2019-07-15 RX ADMIN — METOCLOPRAMIDE 10 MG: 5 INJECTION, SOLUTION INTRAMUSCULAR; INTRAVENOUS at 19:17

## 2019-07-15 ASSESSMENT — PAIN DESCRIPTION - PAIN TYPE: TYPE: ACUTE PAIN

## 2019-07-15 ASSESSMENT — PAIN DESCRIPTION - LOCATION
LOCATION: HEAD
LOCATION: HEAD

## 2019-07-15 ASSESSMENT — PAIN DESCRIPTION - PROGRESSION: CLINICAL_PROGRESSION: GRADUALLY WORSENING

## 2019-07-15 ASSESSMENT — PAIN SCALES - GENERAL
PAINLEVEL_OUTOF10: 10
PAINLEVEL_OUTOF10: 10
PAINLEVEL_OUTOF10: 1

## 2019-07-15 ASSESSMENT — PAIN DESCRIPTION - FREQUENCY: FREQUENCY: CONTINUOUS

## 2019-07-15 ASSESSMENT — PAIN DESCRIPTION - DESCRIPTORS: DESCRIPTORS: THROBBING

## 2019-09-12 ENCOUNTER — HOSPITAL ENCOUNTER (EMERGENCY)
Facility: HOSPITAL | Age: 54
Discharge: HOME | End: 2019-09-12
Attending: FAMILY MEDICINE
Payer: MEDICARE

## 2019-09-12 VITALS
SYSTOLIC BLOOD PRESSURE: 175 MMHG | HEIGHT: 68 IN | DIASTOLIC BLOOD PRESSURE: 87 MMHG | TEMPERATURE: 98.4 F | RESPIRATION RATE: 16 BRPM | WEIGHT: 180 LBS | BODY MASS INDEX: 27.28 KG/M2 | HEART RATE: 103 BPM | OXYGEN SATURATION: 94 %

## 2019-09-12 DIAGNOSIS — R51.9 NONINTRACTABLE HEADACHE, UNSPECIFIED CHRONICITY PATTERN, UNSPECIFIED HEADACHE TYPE: Primary | ICD-10-CM

## 2019-09-12 PROCEDURE — 99283 EMERGENCY DEPT VISIT LOW MDM: CPT

## 2019-09-12 PROCEDURE — 6360000002 HC RX W HCPCS: Performed by: FAMILY MEDICINE

## 2019-09-12 PROCEDURE — 96372 THER/PROPH/DIAG INJ SC/IM: CPT

## 2019-09-12 RX ORDER — MEPERIDINE HYDROCHLORIDE 50 MG/ML
75 INJECTION INTRAMUSCULAR; INTRAVENOUS; SUBCUTANEOUS ONCE
Status: COMPLETED | OUTPATIENT
Start: 2019-09-12 | End: 2019-09-12

## 2019-09-12 RX ORDER — PROMETHAZINE HYDROCHLORIDE 25 MG/ML
12.5 INJECTION, SOLUTION INTRAMUSCULAR; INTRAVENOUS ONCE
Status: COMPLETED | OUTPATIENT
Start: 2019-09-12 | End: 2019-09-12

## 2019-09-12 RX ADMIN — PROMETHAZINE HYDROCHLORIDE 12.5 MG: 25 INJECTION INTRAMUSCULAR; INTRAVENOUS at 13:28

## 2019-09-12 RX ADMIN — MEPERIDINE HYDROCHLORIDE 75 MG: 50 INJECTION INTRAMUSCULAR; INTRAVENOUS; SUBCUTANEOUS at 13:28

## 2019-09-12 ASSESSMENT — PAIN SCALES - GENERAL
PAINLEVEL_OUTOF10: 10
PAINLEVEL_OUTOF10: 5
PAINLEVEL_OUTOF10: 10

## 2019-09-12 ASSESSMENT — PAIN DESCRIPTION - PAIN TYPE: TYPE: ACUTE PAIN

## 2019-09-12 ASSESSMENT — PAIN DESCRIPTION - ORIENTATION: ORIENTATION: LEFT

## 2019-09-12 ASSESSMENT — PAIN DESCRIPTION - FREQUENCY: FREQUENCY: CONTINUOUS

## 2019-09-12 ASSESSMENT — ENCOUNTER SYMPTOMS
ABDOMINAL PAIN: 0
VOMITING: 0
NAUSEA: 1

## 2019-09-12 ASSESSMENT — PAIN DESCRIPTION - DESCRIPTORS: DESCRIPTORS: ACHING;THROBBING

## 2019-09-12 ASSESSMENT — PAIN DESCRIPTION - LOCATION: LOCATION: HEAD

## 2019-09-12 NOTE — ED TRIAGE NOTES
Pt presents with migraine since 0400 this am.  Rates pain as 10/10. States he is painting his deck and he thinks that is what caused it.

## 2019-09-12 NOTE — ED NOTES
Dc instructions given to patient, headache has improved, no other questions or concerns.      Wally Pappas RN  09/12/19 8042

## 2019-09-14 ENCOUNTER — HOSPITAL ENCOUNTER (EMERGENCY)
Facility: HOSPITAL | Age: 54
Discharge: HOME | End: 2019-09-14
Attending: EMERGENCY MEDICINE
Payer: MEDICARE

## 2019-09-14 VITALS
DIASTOLIC BLOOD PRESSURE: 98 MMHG | WEIGHT: 180 LBS | HEIGHT: 68 IN | BODY MASS INDEX: 27.28 KG/M2 | OXYGEN SATURATION: 96 % | RESPIRATION RATE: 20 BRPM | TEMPERATURE: 97.8 F | HEART RATE: 89 BPM | SYSTOLIC BLOOD PRESSURE: 130 MMHG

## 2019-09-14 DIAGNOSIS — G43.009 MIGRAINE WITHOUT AURA AND WITHOUT STATUS MIGRAINOSUS, NOT INTRACTABLE: Primary | ICD-10-CM

## 2019-09-14 PROCEDURE — 6360000002 HC RX W HCPCS: Performed by: EMERGENCY MEDICINE

## 2019-09-14 PROCEDURE — 96372 THER/PROPH/DIAG INJ SC/IM: CPT

## 2019-09-14 PROCEDURE — 99283 EMERGENCY DEPT VISIT LOW MDM: CPT

## 2019-09-14 RX ORDER — MEPERIDINE HYDROCHLORIDE 25 MG/ML
25 INJECTION INTRAMUSCULAR; INTRAVENOUS; SUBCUTANEOUS ONCE
Status: COMPLETED | OUTPATIENT
Start: 2019-09-14 | End: 2019-09-14

## 2019-09-14 RX ORDER — ONDANSETRON 4 MG/1
4 TABLET, ORALLY DISINTEGRATING ORAL EVERY 8 HOURS PRN
Qty: 14 TABLET | Refills: 0 | Status: SHIPPED | OUTPATIENT
Start: 2019-09-14 | End: 2019-10-25

## 2019-09-14 RX ORDER — BUTALBITAL, ACETAMINOPHEN AND CAFFEINE 50; 325; 40 MG/1; MG/1; MG/1
1 TABLET ORAL EVERY 4 HOURS PRN
Qty: 18 TABLET | Refills: 3 | Status: SHIPPED | OUTPATIENT
Start: 2019-09-14 | End: 2019-10-25

## 2019-09-14 RX ORDER — PROMETHAZINE HYDROCHLORIDE 25 MG/ML
25 INJECTION, SOLUTION INTRAMUSCULAR; INTRAVENOUS ONCE
Status: COMPLETED | OUTPATIENT
Start: 2019-09-14 | End: 2019-09-14

## 2019-09-14 RX ADMIN — PROMETHAZINE HYDROCHLORIDE 25 MG: 25 INJECTION INTRAMUSCULAR; INTRAVENOUS at 09:24

## 2019-09-14 RX ADMIN — MEPERIDINE HYDROCHLORIDE 25 MG: 25 INJECTION INTRAMUSCULAR; INTRAVENOUS; SUBCUTANEOUS at 09:24

## 2019-09-14 ASSESSMENT — ENCOUNTER SYMPTOMS
SORE THROAT: 0
EYE REDNESS: 0
SHORTNESS OF BREATH: 0
DIARRHEA: 0
EYE PAIN: 0
EYE DISCHARGE: 0
VOMITING: 1
CONSTIPATION: 0
ABDOMINAL PAIN: 0
WHEEZING: 0
BACK PAIN: 0
COUGH: 0
NAUSEA: 1
RHINORRHEA: 0
CHEST TIGHTNESS: 0
TROUBLE SWALLOWING: 0
SINUS PRESSURE: 0

## 2019-09-14 ASSESSMENT — PAIN SCALES - GENERAL
PAINLEVEL_OUTOF10: 10
PAINLEVEL_OUTOF10: 7

## 2019-09-14 ASSESSMENT — PAIN DESCRIPTION - DESCRIPTORS: DESCRIPTORS: CONSTANT

## 2019-09-14 ASSESSMENT — PAIN DESCRIPTION - LOCATION: LOCATION: HEAD

## 2019-09-14 ASSESSMENT — PAIN DESCRIPTION - PAIN TYPE: TYPE: ACUTE PAIN

## 2019-09-14 ASSESSMENT — PAIN DESCRIPTION - FREQUENCY: FREQUENCY: CONTINUOUS

## 2019-09-14 ASSESSMENT — PAIN DESCRIPTION - ONSET: ONSET: GRADUAL

## 2019-09-14 ASSESSMENT — PAIN DESCRIPTION - PROGRESSION: CLINICAL_PROGRESSION: GRADUALLY WORSENING

## 2019-09-14 NOTE — ED PROVIDER NOTES
together: None     Attends Buddhism service: None     Active member of club or organization: None     Attends meetings of clubs or organizations: None     Relationship status: None    Intimate partner violence:     Fear of current or ex partner: None     Emotionally abused: None     Physically abused: None     Forced sexual activity: None   Other Topics Concern    None   Social History Narrative    None       SCREENINGS             PHYSICAL EXAM    (up to 7 for level 4, 8 or more for level 5)     ED Triage Vitals [09/14/19 0858]   BP Temp Temp Source Pulse Resp SpO2 Height Weight   (!) 150/102 97.8 °F (36.6 °C) Oral 87 20 100 % 5' 8\" (1.727 m) 180 lb (81.6 kg)       Physical Exam   Constitutional: He is oriented to person, place, and time. He appears well-developed and well-nourished. HENT:   Head: Normocephalic and atraumatic. Right Ear: External ear normal.   Left Ear: External ear normal.   Mouth/Throat: Oropharynx is clear and moist.   Eyes: Pupils are equal, round, and reactive to light. Conjunctivae and EOM are normal.   Neck: Neck supple. Cardiovascular: Normal rate, regular rhythm and normal heart sounds. Pulmonary/Chest: Effort normal and breath sounds normal.   Abdominal: Soft. Bowel sounds are normal. He exhibits no distension. There is no tenderness. There is no guarding. Musculoskeletal: Normal range of motion. Neurological: He is alert and oriented to person, place, and time. No cranial nerve deficit or sensory deficit. He exhibits normal muscle tone. Coordination normal.   Skin: Skin is warm and dry. Psychiatric: He has a normal mood and affect.        DIAGNOSTIC RESULTS     EKG: All EKG's are interpreted by the Emergency Department Physician who either signs or Co-signs this chart in the absence of a cardiologist.        RADIOLOGY:   Non-plain film images such as CT, Ultrasound and MRI are read by the radiologist. Plain radiographic images are visualized andpreliminarily interpreted

## 2019-09-14 NOTE — ED NOTES
Dc instructions given to patient with rx's zofran and fioricet, no other questions or concerns.      Iliana Britt RN  09/14/19 5989

## 2019-09-15 ENCOUNTER — APPOINTMENT (OUTPATIENT)
Dept: GENERAL RADIOLOGY | Facility: HOSPITAL | Age: 54
End: 2019-09-15
Payer: MEDICARE

## 2019-09-15 ENCOUNTER — HOSPITAL ENCOUNTER (EMERGENCY)
Facility: HOSPITAL | Age: 54
Discharge: HOME | End: 2019-09-15
Payer: MEDICARE

## 2019-09-15 ENCOUNTER — APPOINTMENT (OUTPATIENT)
Dept: CT IMAGING | Facility: HOSPITAL | Age: 54
End: 2019-09-15
Payer: MEDICARE

## 2019-09-15 VITALS
OXYGEN SATURATION: 98 % | TEMPERATURE: 97.9 F | DIASTOLIC BLOOD PRESSURE: 90 MMHG | RESPIRATION RATE: 20 BRPM | HEART RATE: 93 BPM | WEIGHT: 180 LBS | SYSTOLIC BLOOD PRESSURE: 133 MMHG | BODY MASS INDEX: 27.37 KG/M2

## 2019-09-15 DIAGNOSIS — S80.212A ABRASION OF LEFT KNEE, INITIAL ENCOUNTER: ICD-10-CM

## 2019-09-15 DIAGNOSIS — R55 SYNCOPE AND COLLAPSE: Primary | ICD-10-CM

## 2019-09-15 LAB
A/G RATIO: 1.3 (ref 0.8–2)
ALBUMIN SERPL-MCNC: 4.7 G/DL (ref 3.4–4.8)
ALP BLD-CCNC: 44 U/L (ref 25–100)
ALT SERPL-CCNC: 24 U/L (ref 4–36)
ANION GAP SERPL CALCULATED.3IONS-SCNC: 13 MMOL/L (ref 3–16)
AST SERPL-CCNC: 27 U/L (ref 8–33)
BASOPHILS ABSOLUTE: 0.1 K/UL (ref 0–0.1)
BASOPHILS RELATIVE PERCENT: 0.8 %
BILIRUB SERPL-MCNC: 0.5 MG/DL (ref 0.3–1.2)
BUN BLDV-MCNC: 5 MG/DL (ref 6–20)
CALCIUM SERPL-MCNC: 9.8 MG/DL (ref 8.5–10.5)
CHLORIDE BLD-SCNC: 100 MMOL/L (ref 98–107)
CO2: 22 MMOL/L (ref 20–30)
CREAT SERPL-MCNC: 1.1 MG/DL (ref 0.4–1.2)
EOSINOPHILS ABSOLUTE: 0.1 K/UL (ref 0–0.4)
EOSINOPHILS RELATIVE PERCENT: 0.9 %
GFR AFRICAN AMERICAN: >59
GFR NON-AFRICAN AMERICAN: >59
GLOBULIN: 3.6 G/DL
GLUCOSE BLD-MCNC: 183 MG/DL (ref 74–106)
HCT VFR BLD CALC: 38.5 % (ref 40–54)
HEMOGLOBIN: 12.8 G/DL (ref 13–18)
IMMATURE GRANULOCYTES #: 0 K/UL
IMMATURE GRANULOCYTES %: 0.4 % (ref 0–5)
LYMPHOCYTES ABSOLUTE: 2 K/UL (ref 1.5–4)
LYMPHOCYTES RELATIVE PERCENT: 25.7 %
MCH RBC QN AUTO: 29.4 PG (ref 27–32)
MCHC RBC AUTO-ENTMCNC: 33.2 G/DL (ref 31–35)
MCV RBC AUTO: 88.3 FL (ref 80–100)
MONOCYTES ABSOLUTE: 0.7 K/UL (ref 0.2–0.8)
MONOCYTES RELATIVE PERCENT: 9 %
NEUTROPHILS ABSOLUTE: 4.9 K/UL (ref 2–7.5)
NEUTROPHILS RELATIVE PERCENT: 63.2 %
PDW BLD-RTO: 13.7 % (ref 11–16)
PLATELET # BLD: 408 K/UL (ref 150–400)
PMV BLD AUTO: 8.6 FL (ref 6–10)
POTASSIUM SERPL-SCNC: 4.2 MMOL/L (ref 3.4–5.1)
RBC # BLD: 4.36 M/UL (ref 4.5–6)
SODIUM BLD-SCNC: 135 MMOL/L (ref 136–145)
TOTAL PROTEIN: 8.3 G/DL (ref 6.4–8.3)
TROPONIN: <0.3 NG/ML
WBC # BLD: 7.8 K/UL (ref 4–11)

## 2019-09-15 PROCEDURE — 84484 ASSAY OF TROPONIN QUANT: CPT

## 2019-09-15 PROCEDURE — 85025 COMPLETE CBC W/AUTO DIFF WBC: CPT

## 2019-09-15 PROCEDURE — 80053 COMPREHEN METABOLIC PANEL: CPT

## 2019-09-15 PROCEDURE — 99284 EMERGENCY DEPT VISIT MOD MDM: CPT

## 2019-09-15 PROCEDURE — 70450 CT HEAD/BRAIN W/O DYE: CPT

## 2019-09-15 PROCEDURE — 36415 COLL VENOUS BLD VENIPUNCTURE: CPT

## 2019-09-15 PROCEDURE — 73560 X-RAY EXAM OF KNEE 1 OR 2: CPT

## 2019-09-15 PROCEDURE — 93005 ELECTROCARDIOGRAM TRACING: CPT

## 2019-09-18 ASSESSMENT — ENCOUNTER SYMPTOMS
TROUBLE SWALLOWING: 0
RHINORRHEA: 0
SHORTNESS OF BREATH: 0
VOMITING: 0
NAUSEA: 0
CONSTIPATION: 0
SINUS PRESSURE: 0
COUGH: 0
SORE THROAT: 0
CHEST TIGHTNESS: 0
EYE REDNESS: 0
ABDOMINAL PAIN: 0
DIARRHEA: 0
EYE DISCHARGE: 0
BACK PAIN: 0
EYE PAIN: 0
WHEEZING: 0

## 2019-10-25 ENCOUNTER — HOSPITAL ENCOUNTER (EMERGENCY)
Facility: HOSPITAL | Age: 54
Discharge: HOME | End: 2019-10-25
Attending: HOSPITALIST
Payer: MEDICARE

## 2019-10-25 VITALS
SYSTOLIC BLOOD PRESSURE: 131 MMHG | HEART RATE: 90 BPM | WEIGHT: 185 LBS | HEIGHT: 68 IN | TEMPERATURE: 98.2 F | RESPIRATION RATE: 16 BRPM | DIASTOLIC BLOOD PRESSURE: 87 MMHG | OXYGEN SATURATION: 92 % | BODY MASS INDEX: 28.04 KG/M2

## 2019-10-25 DIAGNOSIS — R51.9 CHRONIC NONINTRACTABLE HEADACHE, UNSPECIFIED HEADACHE TYPE: Primary | ICD-10-CM

## 2019-10-25 DIAGNOSIS — G89.29 CHRONIC NONINTRACTABLE HEADACHE, UNSPECIFIED HEADACHE TYPE: Primary | ICD-10-CM

## 2019-10-25 PROCEDURE — 6370000000 HC RX 637 (ALT 250 FOR IP): Performed by: HOSPITALIST

## 2019-10-25 PROCEDURE — 96365 THER/PROPH/DIAG IV INF INIT: CPT

## 2019-10-25 PROCEDURE — 96372 THER/PROPH/DIAG INJ SC/IM: CPT

## 2019-10-25 PROCEDURE — 96375 TX/PRO/DX INJ NEW DRUG ADDON: CPT

## 2019-10-25 PROCEDURE — 2580000003 HC RX 258: Performed by: HOSPITALIST

## 2019-10-25 PROCEDURE — 6360000002 HC RX W HCPCS: Performed by: HOSPITALIST

## 2019-10-25 PROCEDURE — 99283 EMERGENCY DEPT VISIT LOW MDM: CPT

## 2019-10-25 RX ORDER — KETOROLAC TROMETHAMINE 30 MG/ML
30 INJECTION, SOLUTION INTRAMUSCULAR; INTRAVENOUS ONCE
Status: COMPLETED | OUTPATIENT
Start: 2019-10-25 | End: 2019-10-25

## 2019-10-25 RX ORDER — 0.9 % SODIUM CHLORIDE 0.9 %
1000 INTRAVENOUS SOLUTION INTRAVENOUS ONCE
Status: COMPLETED | OUTPATIENT
Start: 2019-10-25 | End: 2019-10-25

## 2019-10-25 RX ORDER — DIPHENHYDRAMINE HYDROCHLORIDE 50 MG/ML
25 INJECTION INTRAMUSCULAR; INTRAVENOUS ONCE
Status: COMPLETED | OUTPATIENT
Start: 2019-10-25 | End: 2019-10-25

## 2019-10-25 RX ORDER — SUMATRIPTAN 6 MG/.5ML
6 INJECTION, SOLUTION SUBCUTANEOUS ONCE
Status: COMPLETED | OUTPATIENT
Start: 2019-10-25 | End: 2019-10-25

## 2019-10-25 RX ADMIN — SODIUM CHLORIDE 1000 ML: 9 INJECTION, SOLUTION INTRAVENOUS at 18:59

## 2019-10-25 RX ADMIN — KETOROLAC TROMETHAMINE 30 MG: 30 INJECTION, SOLUTION INTRAMUSCULAR at 18:59

## 2019-10-25 RX ADMIN — DIPHENHYDRAMINE HYDROCHLORIDE 25 MG: 50 INJECTION, SOLUTION INTRAMUSCULAR; INTRAVENOUS at 19:00

## 2019-10-25 RX ADMIN — SUMATRIPTAN 6 MG: 6 INJECTION, SOLUTION SUBCUTANEOUS at 18:59

## 2019-10-25 RX ADMIN — Medication 25 MG: at 18:59

## 2019-10-25 ASSESSMENT — PAIN DESCRIPTION - DESCRIPTORS: DESCRIPTORS: ACHING

## 2019-10-25 ASSESSMENT — PAIN DESCRIPTION - LOCATION: LOCATION: HEAD

## 2019-10-25 ASSESSMENT — PAIN DESCRIPTION - ORIENTATION: ORIENTATION: RIGHT;LEFT

## 2019-10-25 ASSESSMENT — PAIN DESCRIPTION - PAIN TYPE: TYPE: ACUTE PAIN

## 2019-10-25 ASSESSMENT — PAIN SCALES - GENERAL
PAINLEVEL_OUTOF10: 9
PAINLEVEL_OUTOF10: 10

## 2019-10-25 ASSESSMENT — PAIN DESCRIPTION - FREQUENCY: FREQUENCY: CONTINUOUS

## 2020-01-18 ENCOUNTER — HOSPITAL ENCOUNTER (EMERGENCY)
Facility: HOSPITAL | Age: 55
Discharge: HOME | End: 2020-01-18
Attending: EMERGENCY MEDICINE
Payer: MEDICARE

## 2020-01-18 VITALS
WEIGHT: 178 LBS | OXYGEN SATURATION: 96 % | HEIGHT: 68 IN | RESPIRATION RATE: 16 BRPM | SYSTOLIC BLOOD PRESSURE: 148 MMHG | DIASTOLIC BLOOD PRESSURE: 94 MMHG | BODY MASS INDEX: 26.98 KG/M2 | HEART RATE: 92 BPM | TEMPERATURE: 97.9 F

## 2020-01-18 PROCEDURE — 96372 THER/PROPH/DIAG INJ SC/IM: CPT

## 2020-01-18 PROCEDURE — 6360000002 HC RX W HCPCS: Performed by: EMERGENCY MEDICINE

## 2020-01-18 PROCEDURE — 99282 EMERGENCY DEPT VISIT SF MDM: CPT

## 2020-01-18 RX ORDER — MEPERIDINE HYDROCHLORIDE 25 MG/ML
25 INJECTION INTRAMUSCULAR; INTRAVENOUS; SUBCUTANEOUS ONCE
Status: DISCONTINUED | OUTPATIENT
Start: 2020-01-18 | End: 2020-01-18

## 2020-01-18 RX ORDER — MEPERIDINE HYDROCHLORIDE 25 MG/ML
25 INJECTION INTRAMUSCULAR; INTRAVENOUS; SUBCUTANEOUS ONCE
Status: COMPLETED | OUTPATIENT
Start: 2020-01-18 | End: 2020-01-18

## 2020-01-18 RX ORDER — PROMETHAZINE HYDROCHLORIDE 25 MG/ML
25 INJECTION, SOLUTION INTRAMUSCULAR; INTRAVENOUS ONCE
Status: COMPLETED | OUTPATIENT
Start: 2020-01-18 | End: 2020-01-18

## 2020-01-18 RX ADMIN — PROMETHAZINE HYDROCHLORIDE 25 MG: 25 INJECTION INTRAMUSCULAR; INTRAVENOUS at 13:36

## 2020-01-18 RX ADMIN — MEPERIDINE HYDROCHLORIDE 25 MG: 25 INJECTION, SOLUTION INTRAMUSCULAR; INTRAVENOUS; SUBCUTANEOUS at 13:36

## 2020-01-18 ASSESSMENT — ENCOUNTER SYMPTOMS
DIARRHEA: 0
EYE REDNESS: 0
WHEEZING: 0
COUGH: 0
BACK PAIN: 0
CHEST TIGHTNESS: 0
TROUBLE SWALLOWING: 0
CONSTIPATION: 0
RHINORRHEA: 0
ABDOMINAL PAIN: 0
SHORTNESS OF BREATH: 0
SINUS PRESSURE: 0
NAUSEA: 1
EYE PAIN: 0
SORE THROAT: 0
VOMITING: 0
EYE DISCHARGE: 0

## 2020-01-18 ASSESSMENT — PAIN SCALES - GENERAL: PAINLEVEL_OUTOF10: 9

## 2020-01-18 ASSESSMENT — PAIN DESCRIPTION - DESCRIPTORS: DESCRIPTORS: ACHING

## 2020-01-18 ASSESSMENT — PAIN DESCRIPTION - FREQUENCY: FREQUENCY: CONTINUOUS

## 2020-01-18 ASSESSMENT — PAIN DESCRIPTION - LOCATION: LOCATION: HEAD

## 2020-01-18 ASSESSMENT — PAIN DESCRIPTION - PAIN TYPE: TYPE: ACUTE PAIN;CHRONIC PAIN

## 2020-02-19 ENCOUNTER — HOSPITAL ENCOUNTER (EMERGENCY)
Facility: HOSPITAL | Age: 55
Discharge: HOME | End: 2020-02-19
Attending: HOSPITALIST
Payer: MEDICARE

## 2020-02-19 VITALS
OXYGEN SATURATION: 97 % | RESPIRATION RATE: 16 BRPM | TEMPERATURE: 98.4 F | HEIGHT: 68 IN | HEART RATE: 85 BPM | SYSTOLIC BLOOD PRESSURE: 163 MMHG | WEIGHT: 180 LBS | DIASTOLIC BLOOD PRESSURE: 102 MMHG | BODY MASS INDEX: 27.28 KG/M2

## 2020-02-19 PROCEDURE — 96365 THER/PROPH/DIAG IV INF INIT: CPT

## 2020-02-19 PROCEDURE — 96372 THER/PROPH/DIAG INJ SC/IM: CPT

## 2020-02-19 PROCEDURE — 96375 TX/PRO/DX INJ NEW DRUG ADDON: CPT

## 2020-02-19 PROCEDURE — 99282 EMERGENCY DEPT VISIT SF MDM: CPT

## 2020-02-19 PROCEDURE — 6360000002 HC RX W HCPCS: Performed by: HOSPITALIST

## 2020-02-19 PROCEDURE — 6370000000 HC RX 637 (ALT 250 FOR IP): Performed by: HOSPITALIST

## 2020-02-19 PROCEDURE — 2580000003 HC RX 258: Performed by: HOSPITALIST

## 2020-02-19 RX ORDER — 0.9 % SODIUM CHLORIDE 0.9 %
1000 INTRAVENOUS SOLUTION INTRAVENOUS ONCE
Status: COMPLETED | OUTPATIENT
Start: 2020-02-19 | End: 2020-02-19

## 2020-02-19 RX ORDER — SUMATRIPTAN 6 MG/.5ML
6 INJECTION, SOLUTION SUBCUTANEOUS ONCE
Status: COMPLETED | OUTPATIENT
Start: 2020-02-19 | End: 2020-02-19

## 2020-02-19 RX ORDER — PROMETHAZINE HYDROCHLORIDE 25 MG/1
25 TABLET ORAL EVERY 6 HOURS PRN
Qty: 10 TABLET | Refills: 0 | Status: SHIPPED | OUTPATIENT
Start: 2020-02-19 | End: 2020-02-26

## 2020-02-19 RX ORDER — DIPHENHYDRAMINE HYDROCHLORIDE 50 MG/ML
25 INJECTION INTRAMUSCULAR; INTRAVENOUS ONCE
Status: COMPLETED | OUTPATIENT
Start: 2020-02-19 | End: 2020-02-19

## 2020-02-19 RX ORDER — BUTALBITAL, ACETAMINOPHEN AND CAFFEINE 300; 40; 50 MG/1; MG/1; MG/1
1 CAPSULE ORAL EVERY 4 HOURS PRN
Qty: 18 CAPSULE | Refills: 0 | Status: SHIPPED | OUTPATIENT
Start: 2020-02-19 | End: 2020-04-27

## 2020-02-19 RX ORDER — KETOROLAC TROMETHAMINE 30 MG/ML
30 INJECTION, SOLUTION INTRAMUSCULAR; INTRAVENOUS ONCE
Status: COMPLETED | OUTPATIENT
Start: 2020-02-19 | End: 2020-02-19

## 2020-02-19 RX ADMIN — Medication 25 MG: at 07:25

## 2020-02-19 RX ADMIN — SODIUM CHLORIDE 1000 ML: 9 INJECTION, SOLUTION INTRAVENOUS at 07:23

## 2020-02-19 RX ADMIN — SUMATRIPTAN 6 MG: 6 INJECTION, SOLUTION SUBCUTANEOUS at 07:24

## 2020-02-19 RX ADMIN — DIPHENHYDRAMINE HYDROCHLORIDE 25 MG: 50 INJECTION, SOLUTION INTRAMUSCULAR; INTRAVENOUS at 07:25

## 2020-02-19 RX ADMIN — KETOROLAC TROMETHAMINE 30 MG: 30 INJECTION, SOLUTION INTRAMUSCULAR at 07:23

## 2020-02-19 ASSESSMENT — PAIN SCALES - GENERAL
PAINLEVEL_OUTOF10: 8
PAINLEVEL_OUTOF10: 10

## 2020-02-19 NOTE — ED PROVIDER NOTES
Not on file   Social History Narrative    Not on file         PHYSICAL EXAM    (up to 7 for level 4, 8 or more for level 5)     ED Triage Vitals   BP Temp Temp src Pulse Resp SpO2 Height Weight   -- -- -- -- -- -- -- --       Physical Exam  General :Patient is awake, alert, oriented, in no acute distress, nontoxic appearing  HEENT: Pupils are equally round and reactive to light, EOMI, conjunctivae clear. Oral mucosa is moist, no exudate. Uvula is midline  Cardiac: Heart regular rate, rhythm, no murmurs, rubs, or gallops  Lungs: Lungs are clear to auscultation, there is no wheezing, rhonchi, or rales. There is no use of accessory muscles. Abdomen: Abdomen is soft, nontender, nondistended. There is no firm or pulsatile masses, no rebound rigidity or guarding. Musculoskeletal: 5 out of 5 strength in all 4 extremities. No focal muscle deficits are appreciated  Neuro: Motor intact, sensory intact, level of consciousness is normal, cerebellar function is normal, reflexes are grossly normal.   Dermatology: Skin is warm and dry  Psych: Mentation is grossly normal, cognition is grossly normal. Affect is appropriate. DIAGNOSTIC RESULTS     EKG: All EKG's are interpreted by the Emergency Department Physician who either signs or Co-signs this chart in the [address] a cardiologist.         RADIOLOGY:   Non-plain film images such as CT, Ultrasound and MRI are read by the radiologist. Plain radiographic images are visualized and preliminarily interpreted by the emergency physician with the below findings:      ? Radiologist's Report Reviewed:  No orders to display         ED BEDSIDE ULTRASOUND:   Performed by ED Physician - none    LABS:    I have reviewed and interpreted all of the currently available lab results from this visit (ifapplicable):  No results found for this visit on 02/19/20. All other labs were within normal range or not returned as of this dictation.     EMERGENCY DEPARTMENT COURSE and DIFFERENTIAL

## 2020-02-20 ENCOUNTER — HOSPITAL ENCOUNTER (EMERGENCY)
Facility: HOSPITAL | Age: 55
Discharge: HOME | End: 2020-02-20
Attending: HOSPITALIST
Payer: MEDICARE

## 2020-02-20 VITALS
BODY MASS INDEX: 27.28 KG/M2 | HEIGHT: 68 IN | WEIGHT: 180 LBS | TEMPERATURE: 98.3 F | DIASTOLIC BLOOD PRESSURE: 98 MMHG | SYSTOLIC BLOOD PRESSURE: 162 MMHG | OXYGEN SATURATION: 99 % | HEART RATE: 87 BPM | RESPIRATION RATE: 16 BRPM

## 2020-02-20 PROCEDURE — 6370000000 HC RX 637 (ALT 250 FOR IP): Performed by: HOSPITALIST

## 2020-02-20 PROCEDURE — 99283 EMERGENCY DEPT VISIT LOW MDM: CPT

## 2020-02-20 PROCEDURE — 96372 THER/PROPH/DIAG INJ SC/IM: CPT

## 2020-02-20 PROCEDURE — 96375 TX/PRO/DX INJ NEW DRUG ADDON: CPT

## 2020-02-20 PROCEDURE — 2580000003 HC RX 258: Performed by: HOSPITALIST

## 2020-02-20 PROCEDURE — 6360000002 HC RX W HCPCS: Performed by: HOSPITALIST

## 2020-02-20 PROCEDURE — 96365 THER/PROPH/DIAG IV INF INIT: CPT

## 2020-02-20 RX ORDER — 0.9 % SODIUM CHLORIDE 0.9 %
1000 INTRAVENOUS SOLUTION INTRAVENOUS ONCE
Status: COMPLETED | OUTPATIENT
Start: 2020-02-20 | End: 2020-02-20

## 2020-02-20 RX ORDER — DEXAMETHASONE SODIUM PHOSPHATE 10 MG/ML
10 INJECTION, SOLUTION INTRAMUSCULAR; INTRAVENOUS ONCE
Status: COMPLETED | OUTPATIENT
Start: 2020-02-20 | End: 2020-02-20

## 2020-02-20 RX ORDER — SUMATRIPTAN 6 MG/.5ML
6 INJECTION, SOLUTION SUBCUTANEOUS ONCE
Status: COMPLETED | OUTPATIENT
Start: 2020-02-20 | End: 2020-02-20

## 2020-02-20 RX ORDER — KETOROLAC TROMETHAMINE 30 MG/ML
30 INJECTION, SOLUTION INTRAMUSCULAR; INTRAVENOUS ONCE
Status: COMPLETED | OUTPATIENT
Start: 2020-02-20 | End: 2020-02-20

## 2020-02-20 RX ORDER — DIPHENHYDRAMINE HYDROCHLORIDE 50 MG/ML
25 INJECTION INTRAMUSCULAR; INTRAVENOUS ONCE
Status: COMPLETED | OUTPATIENT
Start: 2020-02-20 | End: 2020-02-20

## 2020-02-20 RX ADMIN — DEXAMETHASONE SODIUM PHOSPHATE 10 MG: 10 INJECTION, SOLUTION INTRAMUSCULAR; INTRAVENOUS at 14:04

## 2020-02-20 RX ADMIN — KETOROLAC TROMETHAMINE 30 MG: 30 INJECTION, SOLUTION INTRAMUSCULAR at 14:03

## 2020-02-20 RX ADMIN — SUMATRIPTAN 6 MG: 6 INJECTION, SOLUTION SUBCUTANEOUS at 14:09

## 2020-02-20 RX ADMIN — DIPHENHYDRAMINE HYDROCHLORIDE 25 MG: 50 INJECTION, SOLUTION INTRAMUSCULAR; INTRAVENOUS at 14:00

## 2020-02-20 RX ADMIN — SODIUM CHLORIDE 1000 ML: 9 INJECTION, SOLUTION INTRAVENOUS at 14:00

## 2020-02-20 RX ADMIN — Medication 25 MG: at 14:07

## 2020-02-20 RX ADMIN — HYDROMORPHONE HYDROCHLORIDE 1 MG: 1 INJECTION, SOLUTION INTRAMUSCULAR; INTRAVENOUS; SUBCUTANEOUS at 15:50

## 2020-02-20 ASSESSMENT — PAIN SCALES - GENERAL: PAINLEVEL_OUTOF10: 10

## 2020-02-20 NOTE — ED TRIAGE NOTES
Patient complaining of headache at this time. Patient states he has nausea but took phenergan and has since stopped.

## 2020-02-20 NOTE — ED NOTES
Patient ambulatory to bathroom without difficulty at this time.      King Jesusita RN  02/20/20 7450

## 2020-02-20 NOTE — ED PROVIDER NOTES
chronic headache has been in the past.      Nursing notes were reviewed. REVIEW OFSYSTEMS    (2-9 systems for level 4, 10 or more for level 5)   ROS:  General:  No fevers, no chills, no weakness  Cardiovascular:  No chest pain, no palpitations  Respiratory:  No shortness of breath, no cough, no wheezing  Gastrointestinal:  No pain, + nausea, no vomiting, no diarrhea  Musculoskeletal:  No muscle pain, no joint pain  Skin:  No rash, no easy bruising  Neurologic:  No speech problems, + headache (chronic), no extremity weakness, + photo/phonophobia  Psychiatric:  No anxiety  Genitourinary:  No dysuria, no hematuria    Except as noted above the remainder of the review of systems was reviewed and negative. PAST MEDICAL HISTORY     Past Medical History:   Diagnosis Date    Asthma     Cancer ([facility] [address]     melanoma on back    Chronic back pain     Depression     Elevated liver enzymes     Erectile dysfunction     Headache     Hyperlipidemia     Hypertension     Hypogonadism     [facility]     Memory loss     S/P Botox injection     neck x12, face x6    Type II or unspecified type diabetes mellitus without mention of complication, not stated as uncontrolled          SURGICAL HISTORY       Past Surgical History:   Procedure Laterality Date    SKIN CANCER EXCISION      back    TONSILLECTOMY           CURRENT MEDICATIONS       Previous Medications    ASPIRIN 81 MG EC TABLET    Take 81 mg by mouth daily. BUPROPION (WELLBUTRIN) 75 MG TABLET    Take 1 tablet by mouth 2 times daily. BUTALBITAL-APAP-CAFFEINE (FIORICET) -40 MG CAPS PER CAPSULE    Take 1 capsule by mouth every 4 hours as needed for Headaches    CYCLOBENZAPRINE (FLEXERIL) 10 MG TABLET    Take 10 mg by mouth 3 times daily as needed for Muscle spasms    DULOXETINE (CYMBALTA) 60 MG CAPSULE    Take 1 capsule by mouth daily. GLUCOSE BLOOD VI TEST STRIPS (JALIL CONTOUR NEXT TEST) STRIP    1 each by Does not apply route daily. As needed. are equally round and reactive to light, EOMI, conjunctivae clear. Oral mucosa is moist, no exudate. Uvula is midline  Cardiac: Heart regular rate, rhythm, no murmurs, rubs, or gallops  Lungs: Lungs are clear to auscultation, there is no wheezing, rhonchi, or rales. There is no use of accessory muscles. Abdomen: Abdomen is soft, nontender, nondistended. There is no firm or pulsatile masses, no rebound rigidity or guarding. Musculoskeletal: 5 out of 5 strength in all 4 extremities. No focal muscle deficits are appreciated  Neuro: Motor intact, sensory intact, level of consciousness is normal, cerebellar function is normal, reflexes are grossly normal  Dermatology: Skin is warm and dry  Psych: Mentation is grossly normal, cognition is grossly normal. Affect is appropriate. DIAGNOSTIC RESULTS     EKG: All EKG's are interpreted by the Emergency Department Physician who either signs or Co-signs this chart in the [address] a cardiologist.        RADIOLOGY:   Non-plain film images such as CT, Ultrasound and MRI are read by the radiologist. Plain radiographic images are visualized and preliminarily interpreted by the emergency physician with the below findings:      ? Radiologist's Report Reviewed:  No orders to display         ED BEDSIDE ULTRASOUND:   Performed by ED Physician - none    LABS:    I have reviewed and interpreted all of the currently available lab results from this visit (ifapplicable):  No results found for this visit on 02/20/20. All other labs were within normal range or not returned as of this dictation.     EMERGENCY DEPARTMENT COURSE and DIFFERENTIAL DIAGNOSIS/MDM:   Vitals:    Vitals:    02/20/20 1323   BP: (!) 174/115   Pulse: 84   Resp: 18   Temp: 98.3 °F (36.8 °C)   TempSrc: Oral   SpO2: 99%   Weight: 180 lb (81.6 kg)   Height: 5' 8\" (1.727 m)       MEDICATIONS ADMINISTERED IN ED:  Medications   0.9 % sodium chloride bolus (0 mLs Intravenous Stopped 2/20/20 1500)   ketorolac (TORADOL) injection 30 mg (30 mg Intravenous Given 2/20/20 1403)   promethazine (PHENERGAN) in sodium chloride 0.9% 50 mL IVPB 25 mg (0 mg Intravenous Stopped 2/20/20 1437)   diphenhydrAMINE (BENADRYL) injection 25 mg (25 mg Intravenous Given 2/20/20 1400)   SUMAtriptan (IMITREX) injection 6 mg (6 mg Subcutaneous Given 2/20/20 1409)   dexamethasone (PF) (DECADRON) injection 10 mg (10 mg Intravenous Given 2/20/20 1404)   HYDROmorphone (DILAUDID) injection 1 mg (1 mg Intravenous Given 2/20/20 1550)       After initial evaluation and examination I did have a conversation with the patient about the upcoming plan, treatment and possible disposition which she was agreeable to the time this dictation. Patient advised again that we would place an IV and give him a fluid bolus of normal saline. We will give him Toradol, Phenergan and Benadryl and Imitrex for his headache at this time. We will also add Decadron to the regimen at this time. Patient will be given a short period of observation to see if his headache improves. Again the patient was offered a CT scan of the head for evaluation of the headache since it was worse today but declines again. Patient rates his pain a 10 out of 10 upon arrival.  After administration of the medication and reassessment patient rates his pain as a 10. Patient states that his pain did not change at all after waiting 1 hour from the ministration of the other medication. Patient states that it probably is more his fault than anything by waiting for his headache to become a 10 out of 10 before he seeks evaluation. Patient advised that routinely we do not give narcotic pain medication for headache but since he had a headache yesterday with minimal improvement and then today comes back with a 10 out of 10 pain and no change with the medication that we give him 1 dose of Dilaudid 1 mg IV to see if this does help improve his headache. Patient was agreeable to this plan.   We will reassess the patient's pain after short period of observation after the administration of this new medication. Patient pain now is down to a 5 or 6. Patient states he feels much better at this time. Do believe the patient is stable enough to be discharged home at this time. Advised for him to continue with the Fioricet and Phenergan which he was written for yesterday so now that his pain level is down to appropriate range they may have better effect on his headache. Advised to go home and lay down in a quiet dark spot so he can get some rest to see if he can sleep the headache off. Patient does state his understanding. Discharged home in stable condition. The patient will follow-up with their PCP in 1-2 days for reevaluation. Patient advised that if symptoms worsens or new symptoms arise he should return back to the emergency department for further evaluation work-up. CONSULTS:  None    PROCEDURES:  Procedures    CRITICAL CARE TIME    Total Critical Care time was 0 minutes, excluding separately reportable procedures. There was a high probability of clinically significant/life threatening deterioration in the patient's condition which required my urgent intervention. FINAL IMPRESSION      1. Chronic nonintractable headache, unspecified headache type          DISPOSITION/PLAN   DISPOSITION        PATIENT REFERRED TO:  AUGIE Shahid - CNP  [address]  [phone]    In 2 days      [facility] Emergency Department  [facility]  [phone]    As needed, If symptoms worsen      DISCHARGE MEDICATIONS:  New Prescriptions    No medications on file       Comment: Please note this report has been produced using speech recognition software and may contain errorsrelated to that system including errors in grammar, punctuation, and spelling, as well as words and phrases that may be inappropriate.  If there are any questions or concerns please feel free to contact the dictating providerfor clarification.     Iris Vines DO  Attending Emergency Physician              Iris Vines DO  02/20/20 7759

## 2020-03-26 ENCOUNTER — HOSPITAL ENCOUNTER (EMERGENCY)
Facility: HOSPITAL | Age: 55
Discharge: HOME | End: 2020-03-26
Attending: FAMILY MEDICINE
Payer: MEDICARE

## 2020-03-26 VITALS
WEIGHT: 180 LBS | OXYGEN SATURATION: 95 % | HEART RATE: 89 BPM | DIASTOLIC BLOOD PRESSURE: 63 MMHG | BODY MASS INDEX: 26.66 KG/M2 | RESPIRATION RATE: 18 BRPM | SYSTOLIC BLOOD PRESSURE: 117 MMHG | HEIGHT: 69 IN | TEMPERATURE: 97.4 F

## 2020-03-26 PROCEDURE — 99282 EMERGENCY DEPT VISIT SF MDM: CPT

## 2020-03-26 PROCEDURE — 96372 THER/PROPH/DIAG INJ SC/IM: CPT

## 2020-03-26 PROCEDURE — 6360000002 HC RX W HCPCS: Performed by: FAMILY MEDICINE

## 2020-03-26 RX ORDER — MEPERIDINE HYDROCHLORIDE 50 MG/ML
75 INJECTION INTRAMUSCULAR; INTRAVENOUS; SUBCUTANEOUS ONCE
Status: COMPLETED | OUTPATIENT
Start: 2020-03-26 | End: 2020-03-26

## 2020-03-26 RX ORDER — PROMETHAZINE HYDROCHLORIDE 25 MG/ML
12.5 INJECTION, SOLUTION INTRAMUSCULAR; INTRAVENOUS ONCE
Status: COMPLETED | OUTPATIENT
Start: 2020-03-26 | End: 2020-03-26

## 2020-03-26 RX ADMIN — MEPERIDINE HYDROCHLORIDE 75 MG: 50 INJECTION INTRAMUSCULAR; INTRAVENOUS; SUBCUTANEOUS at 13:59

## 2020-03-26 RX ADMIN — PROMETHAZINE HYDROCHLORIDE 12.5 MG: 25 INJECTION INTRAMUSCULAR; INTRAVENOUS at 13:59

## 2020-03-26 ASSESSMENT — PAIN DESCRIPTION - LOCATION: LOCATION: HEAD

## 2020-03-26 ASSESSMENT — PAIN DESCRIPTION - DESCRIPTORS: DESCRIPTORS: SHARP;ACHING

## 2020-03-26 ASSESSMENT — ENCOUNTER SYMPTOMS
ABDOMINAL PAIN: 0
COUGH: 0
NAUSEA: 1
SHORTNESS OF BREATH: 0
VOMITING: 0

## 2020-03-26 ASSESSMENT — PAIN DESCRIPTION - PAIN TYPE: TYPE: ACUTE PAIN

## 2020-03-26 ASSESSMENT — PAIN SCALES - GENERAL
PAINLEVEL_OUTOF10: 9
PAINLEVEL_OUTOF10: 9

## 2020-03-26 ASSESSMENT — PAIN DESCRIPTION - FREQUENCY: FREQUENCY: CONTINUOUS

## 2020-03-26 ASSESSMENT — PAIN DESCRIPTION - PROGRESSION: CLINICAL_PROGRESSION: NOT CHANGED

## 2020-03-26 NOTE — ED NOTES
Dc instructions given to patient, patient headache improved, will follow up for a referral by a pcp.      Gracia Jernigan RN  03/26/20 9128
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
## 2020-04-27 ENCOUNTER — HOSPITAL ENCOUNTER (EMERGENCY)
Facility: HOSPITAL | Age: 55
Discharge: HOME | End: 2020-04-27
Attending: EMERGENCY MEDICINE
Payer: MEDICARE

## 2020-04-27 VITALS
HEIGHT: 68 IN | TEMPERATURE: 98.6 F | RESPIRATION RATE: 18 BRPM | BODY MASS INDEX: 25.76 KG/M2 | DIASTOLIC BLOOD PRESSURE: 80 MMHG | SYSTOLIC BLOOD PRESSURE: 139 MMHG | WEIGHT: 170 LBS | HEART RATE: 100 BPM | OXYGEN SATURATION: 93 %

## 2020-04-27 PROCEDURE — 99282 EMERGENCY DEPT VISIT SF MDM: CPT

## 2020-04-27 PROCEDURE — 6360000002 HC RX W HCPCS: Performed by: EMERGENCY MEDICINE

## 2020-04-27 PROCEDURE — 96372 THER/PROPH/DIAG INJ SC/IM: CPT

## 2020-04-27 RX ORDER — PROMETHAZINE HYDROCHLORIDE 25 MG/ML
25 INJECTION, SOLUTION INTRAMUSCULAR; INTRAVENOUS ONCE
Status: COMPLETED | OUTPATIENT
Start: 2020-04-27 | End: 2020-04-27

## 2020-04-27 RX ORDER — KETOROLAC TROMETHAMINE 30 MG/ML
60 INJECTION, SOLUTION INTRAMUSCULAR; INTRAVENOUS ONCE
Status: COMPLETED | OUTPATIENT
Start: 2020-04-27 | End: 2020-04-27

## 2020-04-27 RX ORDER — ONDANSETRON 4 MG/1
4 TABLET, ORALLY DISINTEGRATING ORAL EVERY 8 HOURS PRN
Qty: 9 TABLET | Refills: 0 | Status: SHIPPED | OUTPATIENT
Start: 2020-04-27 | End: 2022-02-13

## 2020-04-27 RX ADMIN — KETOROLAC TROMETHAMINE 60 MG: 30 INJECTION, SOLUTION INTRAMUSCULAR at 16:57

## 2020-04-27 RX ADMIN — PROMETHAZINE HYDROCHLORIDE 25 MG: 25 INJECTION INTRAMUSCULAR; INTRAVENOUS at 16:55

## 2020-04-27 ASSESSMENT — PAIN SCALES - GENERAL: PAINLEVEL_OUTOF10: 7

## 2020-04-27 ASSESSMENT — PAIN DESCRIPTION - LOCATION: LOCATION: HEAD

## 2020-04-27 NOTE — ED NOTES
Patient to Ed with complaints of headache x 4 days.  Patient denies any sick contacts     Tatum Strong RN  04/27/20 7750

## 2020-04-27 NOTE — ED PROVIDER NOTES
[address] [facility] ENCOUNTER      Pt Name: Fiordaliza Jackson  MRN: [id number]  YOB: 1965  Date of evaluation: 4/27/2020  Provider: Nelida Hoffman MD    [address] [place] [address]       Chief Complaint   Patient presents with    Headache     x 4 days          HISTORY OF PRESENT ILLNESS  (Location/Symptom, Timing/Onset, Context/Setting, Quality, Duration, Modifying Factors, Severity.)   Fiordaliza Jackson is a 53 y.o. male who presents to the emergency department with what the patient describes as 1 of his typical migraines. He states that he has nausea and photophobia associated with it. Patient has been seen multiple times before it. Patient has not followed up with his primary physician. Patient was instructed that he needed to follow-up with his primary physician to look at the potential for prophylaxis for his migraines. Patient denies any chest pain shortness of breath or any focal signs or symptoms      Nursing notes were reviewed. REVIEW OFSYSTEMS    (2-9 systems for level 4, 10 or more for level 5)   ROS:  General:  No fevers, no chills, no weakness  Cardiovascular:  No chest pain, no palpitations  Respiratory:  No shortness of breath, no cough, no wheezing  Gastrointestinal: Nausea  Musculoskeletal:  No muscle pain, no joint pain  Skin:  No rash, no easy bruising  Neurologic: Headache  Psychiatric:  No anxiety  Genitourinary:  No dysuria, no hematuria    Except as noted above the remainder of the review of systems was reviewed and negative.        PAST MEDICAL HISTORY     Past Medical History:   Diagnosis Date    Asthma     Cancer ([facility] [address]     melanoma on back    Chronic back pain     Depression     Elevated liver enzymes     Erectile dysfunction     Headache     Hyperlipidemia     Hypertension     Hypogonadism     Melanoma ([facility]     Memory loss     S/P Botox injection     neck x12, face x6    Type II or unspecified type diabetes

## 2020-04-27 NOTE — ED NOTES
Patient discharge instructions reviewed with verbalized understanding from patient. Medications discussed with verbalized understanding. No further questions or concerns.       Chandra Blandon RN  04/27/20 4273

## 2020-05-26 ENCOUNTER — HOSPITAL ENCOUNTER (EMERGENCY)
Facility: HOSPITAL | Age: 55
Discharge: HOME | End: 2020-05-26
Attending: HOSPITALIST
Payer: MEDICARE

## 2020-05-26 VITALS
BODY MASS INDEX: 27.74 KG/M2 | TEMPERATURE: 97.9 F | HEART RATE: 101 BPM | DIASTOLIC BLOOD PRESSURE: 85 MMHG | SYSTOLIC BLOOD PRESSURE: 117 MMHG | HEIGHT: 68 IN | WEIGHT: 183 LBS | OXYGEN SATURATION: 96 % | RESPIRATION RATE: 18 BRPM

## 2020-05-26 PROCEDURE — 96375 TX/PRO/DX INJ NEW DRUG ADDON: CPT

## 2020-05-26 PROCEDURE — 99283 EMERGENCY DEPT VISIT LOW MDM: CPT

## 2020-05-26 PROCEDURE — 96372 THER/PROPH/DIAG INJ SC/IM: CPT

## 2020-05-26 PROCEDURE — 6370000000 HC RX 637 (ALT 250 FOR IP): Performed by: HOSPITALIST

## 2020-05-26 PROCEDURE — 96374 THER/PROPH/DIAG INJ IV PUSH: CPT

## 2020-05-26 PROCEDURE — 6360000002 HC RX W HCPCS: Performed by: HOSPITALIST

## 2020-05-26 RX ORDER — PROMETHAZINE HYDROCHLORIDE 25 MG/1
25 TABLET ORAL EVERY 6 HOURS PRN
Qty: 10 TABLET | Refills: 0 | Status: SHIPPED | OUTPATIENT
Start: 2020-05-26 | End: 2020-06-02

## 2020-05-26 RX ORDER — BUTALBITAL, ACETAMINOPHEN AND CAFFEINE 50; 325; 40 MG/1; MG/1; MG/1
1 TABLET ORAL EVERY 4 HOURS PRN
Qty: 15 TABLET | Refills: 0 | Status: SHIPPED | OUTPATIENT
Start: 2020-05-26 | End: 2020-06-02 | Stop reason: SDUPTHER

## 2020-05-26 RX ORDER — KETOROLAC TROMETHAMINE 30 MG/ML
30 INJECTION, SOLUTION INTRAMUSCULAR; INTRAVENOUS ONCE
Status: DISCONTINUED | OUTPATIENT
Start: 2020-05-26 | End: 2020-05-26

## 2020-05-26 RX ORDER — SUMATRIPTAN 6 MG/.5ML
6 INJECTION, SOLUTION SUBCUTANEOUS ONCE
Status: COMPLETED | OUTPATIENT
Start: 2020-05-26 | End: 2020-05-26

## 2020-05-26 RX ORDER — DEXAMETHASONE SODIUM PHOSPHATE 10 MG/ML
10 INJECTION INTRAMUSCULAR; INTRAVENOUS ONCE
Status: DISCONTINUED | OUTPATIENT
Start: 2020-05-26 | End: 2020-05-26

## 2020-05-26 RX ORDER — KETOROLAC TROMETHAMINE 30 MG/ML
30 INJECTION, SOLUTION INTRAMUSCULAR; INTRAVENOUS ONCE
Status: COMPLETED | OUTPATIENT
Start: 2020-05-26 | End: 2020-05-26

## 2020-05-26 RX ORDER — 0.9 % SODIUM CHLORIDE 0.9 %
1000 INTRAVENOUS SOLUTION INTRAVENOUS ONCE
Status: DISCONTINUED | OUTPATIENT
Start: 2020-05-26 | End: 2020-05-26

## 2020-05-26 RX ORDER — PROMETHAZINE HYDROCHLORIDE 25 MG/ML
25 INJECTION, SOLUTION INTRAMUSCULAR; INTRAVENOUS ONCE
Status: COMPLETED | OUTPATIENT
Start: 2020-05-26 | End: 2020-05-26

## 2020-05-26 RX ORDER — DIPHENHYDRAMINE HYDROCHLORIDE 50 MG/ML
25 INJECTION INTRAMUSCULAR; INTRAVENOUS ONCE
Status: DISCONTINUED | OUTPATIENT
Start: 2020-05-26 | End: 2020-05-26

## 2020-05-26 RX ORDER — DIPHENHYDRAMINE HYDROCHLORIDE 50 MG/ML
25 INJECTION INTRAMUSCULAR; INTRAVENOUS ONCE
Status: COMPLETED | OUTPATIENT
Start: 2020-05-26 | End: 2020-05-26

## 2020-05-26 RX ADMIN — DIPHENHYDRAMINE HYDROCHLORIDE 25 MG: 50 INJECTION, SOLUTION INTRAMUSCULAR; INTRAVENOUS at 15:54

## 2020-05-26 RX ADMIN — KETOROLAC TROMETHAMINE 30 MG: 30 INJECTION, SOLUTION INTRAMUSCULAR at 15:53

## 2020-05-26 RX ADMIN — SUMATRIPTAN 6 MG: 6 INJECTION SUBCUTANEOUS at 15:53

## 2020-05-26 RX ADMIN — PROMETHAZINE HYDROCHLORIDE 25 MG: 25 INJECTION INTRAMUSCULAR; INTRAVENOUS at 15:54

## 2020-05-26 ASSESSMENT — PAIN DESCRIPTION - PAIN TYPE: TYPE: ACUTE PAIN

## 2020-05-26 ASSESSMENT — PAIN SCALES - GENERAL
PAINLEVEL_OUTOF10: 9
PAINLEVEL_OUTOF10: 9

## 2020-05-26 ASSESSMENT — PAIN DESCRIPTION - DESCRIPTORS: DESCRIPTORS: ACHING

## 2020-05-26 ASSESSMENT — PAIN DESCRIPTION - LOCATION: LOCATION: HEAD

## 2020-05-26 NOTE — ED PROVIDER NOTES
reviewed. REVIEW OFSYSTEMS    (2-9 systems for level 4, 10 or more for level 5)   ROS:  General:  No fevers, no chills, no weakness  Cardiovascular:  No chest pain, no palpitations  Respiratory:  No shortness of breath, no cough, no wheezing  Gastrointestinal:  No pain, + nausea, no vomiting, no diarrhea  Musculoskeletal:  No muscle pain, no joint pain  Skin:  No rash, no easy bruising  Neurologic:  No speech problems, + headache (acute on chronic), no extremity weakness, + phono/photophobia, + factory hypersensitivity  Psychiatric:  No anxiety  Genitourinary:  No dysuria, no hematuria    Except as noted above the remainder of the review of systems was reviewed and negative. PAST MEDICAL HISTORY     Past Medical History:   Diagnosis Date    Asthma     Cancer ([facility] [address]     melanoma on back    Chronic back pain     Depression     Elevated liver enzymes     Erectile dysfunction     Headache     Hyperlipidemia     Hypertension     Hypogonadism     [facility]     Memory loss     S/P Botox injection     neck x12, face x6    Type II or unspecified type diabetes mellitus without mention of complication, not stated as uncontrolled          SURGICAL HISTORY       Past Surgical History:   Procedure Laterality Date    SKIN CANCER EXCISION      back    TONSILLECTOMY           CURRENT MEDICATIONS       Previous Medications    ASPIRIN 81 MG EC TABLET    Take 81 mg by mouth daily. BUPROPION (WELLBUTRIN) 75 MG TABLET    Take 1 tablet by mouth 2 times daily. DULOXETINE (CYMBALTA) 60 MG CAPSULE    Take 1 capsule by mouth daily. GLUCOSE BLOOD VI TEST STRIPS (JALIL CONTOUR NEXT TEST) STRIP    1 each by Does not apply route daily. As needed. METFORMIN (GLUCOPHAGE) 1000 MG TABLET    Take 1 tablet by mouth 2 times daily (with meals).     ONDANSETRON (ZOFRAN ODT) 4 MG DISINTEGRATING TABLET    Take 1 tablet by mouth every 8 hours as needed for Nausea or Vomiting    PROPRANOLOL (INDERAL) 20 MG TABLET IM injections he still has to wait 20 minutes after the shot time to make sure there is no acute reaction. His shot time is  he will be discharged home in stable condition. We will write him for a small amount of Fioricet to use for his headache along with Phenergan for home. The patient advised that he does need to follow-up with his regular family physician within the next 1 to 2 days reevaluation. Patient was also given instructions that if his symptoms worsens or new symptoms arise he should return back to the emergency department for further evaluation work-up. CONSULTS:  None    PROCEDURES:  Procedures    CRITICAL CARE TIME    Total Critical Care time was 0 minutes, excluding separately reportable procedures. There was a high probability of clinically significant/life threatening deterioration in the patient's condition which required my urgent intervention. FINAL IMPRESSION      1. Chronic nonintractable headache, unspecified headache type          DISPOSITION/PLAN   DISPOSITION Discharge - Pending Orders Complete 2020 03:51:50 PM      PATIENT REFERRED TO:  No follow-up provider specified. DISCHARGE MEDICATIONS:  New Prescriptions    BUTALBITAL-ACETAMINOPHEN-CAFFEINE (FIORICET, ESGIC) -40 MG PER TABLET    Take 1 tablet by mouth every 4 hours as needed for Headaches    PROMETHAZINE (PHENERGAN) 25 MG TABLET    Take 1 tablet by mouth every 6 hours as needed for Nausea       Comment: Please note this report has been produced using speech recognition software and may contain errorsrelated to that system including errors in grammar, punctuation, and spelling, as well as words and phrases that may be inappropriate. If there are any questions or concerns please feel free to contact the dictating providerfor clarification.     Rachael Lynch DO  Attending Emergency Physician              Rachael Lynch DO  [phone]

## 2020-05-27 ENCOUNTER — APPOINTMENT (OUTPATIENT)
Dept: GENERAL RADIOLOGY | Facility: HOSPITAL | Age: 55
End: 2020-05-27
Payer: MEDICARE

## 2020-05-27 ENCOUNTER — HOSPITAL ENCOUNTER (EMERGENCY)
Facility: HOSPITAL | Age: 55
Discharge: HOME | End: 2020-05-27
Attending: EMERGENCY MEDICINE
Payer: MEDICARE

## 2020-05-27 VITALS
TEMPERATURE: 98.3 F | HEART RATE: 114 BPM | SYSTOLIC BLOOD PRESSURE: 108 MMHG | RESPIRATION RATE: 20 BRPM | HEIGHT: 68 IN | OXYGEN SATURATION: 92 % | BODY MASS INDEX: 27.28 KG/M2 | DIASTOLIC BLOOD PRESSURE: 61 MMHG | WEIGHT: 180 LBS

## 2020-05-27 LAB
BILIRUBIN URINE: NEGATIVE
BLOOD, URINE: NEGATIVE
CLARITY: CLEAR
COLOR: YELLOW
GLUCOSE URINE: NEGATIVE MG/DL
KETONES, URINE: NEGATIVE MG/DL
LEUKOCYTE ESTERASE, URINE: NEGATIVE
MICROSCOPIC EXAMINATION: NORMAL
NITRITE, URINE: NEGATIVE
PH UA: 5.5 (ref 5–8)
PROTEIN UA: NEGATIVE MG/DL
SPECIFIC GRAVITY UA: 1.01 (ref 1–1.03)
URINE REFLEX TO CULTURE: NORMAL
URINE TYPE: NORMAL
UROBILINOGEN, URINE: 0.2 E.U./DL

## 2020-05-27 PROCEDURE — 81003 URINALYSIS AUTO W/O SCOPE: CPT

## 2020-05-27 PROCEDURE — 99283 EMERGENCY DEPT VISIT LOW MDM: CPT

## 2020-05-27 PROCEDURE — 96374 THER/PROPH/DIAG INJ IV PUSH: CPT

## 2020-05-27 PROCEDURE — 6360000002 HC RX W HCPCS: Performed by: EMERGENCY MEDICINE

## 2020-05-27 PROCEDURE — 72100 X-RAY EXAM L-S SPINE 2/3 VWS: CPT

## 2020-05-27 RX ORDER — ACETAMINOPHEN AND CODEINE PHOSPHATE 300; 30 MG/1; MG/1
1 TABLET ORAL EVERY 6 HOURS PRN
Qty: 12 TABLET | Refills: 0 | Status: SHIPPED | OUTPATIENT
Start: 2020-05-27 | End: 2020-05-30

## 2020-05-27 RX ORDER — CYCLOBENZAPRINE HCL 10 MG
10 TABLET ORAL 3 TIMES DAILY PRN
Qty: 21 TABLET | Refills: 0 | Status: SHIPPED | OUTPATIENT
Start: 2020-05-27 | End: 2020-06-03

## 2020-05-27 RX ORDER — KETOROLAC TROMETHAMINE 30 MG/ML
30 INJECTION, SOLUTION INTRAMUSCULAR; INTRAVENOUS ONCE
Status: COMPLETED | OUTPATIENT
Start: 2020-05-27 | End: 2020-05-27

## 2020-05-27 RX ADMIN — KETOROLAC TROMETHAMINE 30 MG: 30 INJECTION, SOLUTION INTRAMUSCULAR at 20:06

## 2020-05-27 ASSESSMENT — PAIN DESCRIPTION - PAIN TYPE: TYPE: CHRONIC PAIN;ACUTE PAIN

## 2020-05-27 ASSESSMENT — PAIN DESCRIPTION - LOCATION: LOCATION: BACK

## 2020-05-27 ASSESSMENT — PAIN DESCRIPTION - ONSET: ONSET: ON-GOING

## 2020-05-27 ASSESSMENT — PAIN DESCRIPTION - FREQUENCY: FREQUENCY: CONTINUOUS

## 2020-05-27 ASSESSMENT — PAIN SCALES - GENERAL
PAINLEVEL_OUTOF10: 9
PAINLEVEL_OUTOF10: 9

## 2020-05-27 ASSESSMENT — PAIN DESCRIPTION - PROGRESSION: CLINICAL_PROGRESSION: NOT CHANGED

## 2020-05-27 ASSESSMENT — PAIN DESCRIPTION - ORIENTATION: ORIENTATION: LEFT

## 2020-06-02 ENCOUNTER — HOSPITAL ENCOUNTER (EMERGENCY)
Facility: HOSPITAL | Age: 55
Discharge: HOME | End: 2020-06-02
Attending: EMERGENCY MEDICINE
Payer: MEDICARE

## 2020-06-02 VITALS
HEIGHT: 68 IN | HEART RATE: 103 BPM | OXYGEN SATURATION: 98 % | WEIGHT: 175 LBS | RESPIRATION RATE: 18 BRPM | DIASTOLIC BLOOD PRESSURE: 86 MMHG | TEMPERATURE: 97.8 F | BODY MASS INDEX: 26.52 KG/M2 | SYSTOLIC BLOOD PRESSURE: 146 MMHG

## 2020-06-02 PROCEDURE — 6360000002 HC RX W HCPCS: Performed by: EMERGENCY MEDICINE

## 2020-06-02 PROCEDURE — 99282 EMERGENCY DEPT VISIT SF MDM: CPT

## 2020-06-02 PROCEDURE — 96372 THER/PROPH/DIAG INJ SC/IM: CPT

## 2020-06-02 RX ORDER — BUTALBITAL, ACETAMINOPHEN AND CAFFEINE 50; 325; 40 MG/1; MG/1; MG/1
1 TABLET ORAL EVERY 6 HOURS PRN
Qty: 12 TABLET | Refills: 3 | Status: SHIPPED | OUTPATIENT
Start: 2020-06-02 | End: 2020-06-16

## 2020-06-02 RX ORDER — MEPERIDINE HYDROCHLORIDE 25 MG/ML
25 INJECTION INTRAMUSCULAR; INTRAVENOUS; SUBCUTANEOUS ONCE
Status: COMPLETED | OUTPATIENT
Start: 2020-06-02 | End: 2020-06-02

## 2020-06-02 RX ORDER — PROMETHAZINE HYDROCHLORIDE 25 MG/ML
25 INJECTION, SOLUTION INTRAMUSCULAR; INTRAVENOUS ONCE
Status: COMPLETED | OUTPATIENT
Start: 2020-06-02 | End: 2020-06-02

## 2020-06-02 RX ADMIN — MEPERIDINE HYDROCHLORIDE 25 MG: 25 INJECTION INTRAMUSCULAR; INTRAVENOUS; SUBCUTANEOUS at 07:30

## 2020-06-02 RX ADMIN — PROMETHAZINE HYDROCHLORIDE 25 MG: 25 INJECTION INTRAMUSCULAR; INTRAVENOUS at 07:28

## 2020-06-02 ASSESSMENT — PAIN DESCRIPTION - FREQUENCY: FREQUENCY: CONTINUOUS

## 2020-06-02 ASSESSMENT — ENCOUNTER SYMPTOMS
TROUBLE SWALLOWING: 0
BACK PAIN: 0
COUGH: 0
NAUSEA: 0
SINUS PRESSURE: 0
SORE THROAT: 0
VOMITING: 0
CONSTIPATION: 0
EYE PAIN: 0
WHEEZING: 0
SHORTNESS OF BREATH: 0
EYE DISCHARGE: 0
ABDOMINAL PAIN: 0
CHEST TIGHTNESS: 0
EYE REDNESS: 0
DIARRHEA: 0
RHINORRHEA: 0

## 2020-06-02 ASSESSMENT — PAIN DESCRIPTION - LOCATION: LOCATION: HEAD

## 2020-06-02 ASSESSMENT — PAIN SCALES - GENERAL: PAINLEVEL_OUTOF10: 10

## 2020-06-02 ASSESSMENT — PAIN DESCRIPTION - PAIN TYPE: TYPE: ACUTE PAIN

## 2020-06-02 ASSESSMENT — PAIN DESCRIPTION - DESCRIPTORS: DESCRIPTORS: HEADACHE

## 2020-06-16 ENCOUNTER — HOSPITAL ENCOUNTER (EMERGENCY)
Facility: HOSPITAL | Age: 55
Discharge: HOME | End: 2020-06-16
Attending: EMERGENCY MEDICINE
Payer: MEDICARE

## 2020-06-16 VITALS
TEMPERATURE: 97.7 F | OXYGEN SATURATION: 99 % | WEIGHT: 185 LBS | RESPIRATION RATE: 18 BRPM | HEIGHT: 68 IN | DIASTOLIC BLOOD PRESSURE: 89 MMHG | HEART RATE: 105 BPM | BODY MASS INDEX: 28.04 KG/M2 | SYSTOLIC BLOOD PRESSURE: 152 MMHG

## 2020-06-16 PROCEDURE — 6360000002 HC RX W HCPCS: Performed by: EMERGENCY MEDICINE

## 2020-06-16 PROCEDURE — 96372 THER/PROPH/DIAG INJ SC/IM: CPT

## 2020-06-16 PROCEDURE — 99283 EMERGENCY DEPT VISIT LOW MDM: CPT

## 2020-06-16 RX ORDER — PROMETHAZINE HYDROCHLORIDE 25 MG/ML
25 INJECTION, SOLUTION INTRAMUSCULAR; INTRAVENOUS ONCE
Status: COMPLETED | OUTPATIENT
Start: 2020-06-16 | End: 2020-06-16

## 2020-06-16 RX ORDER — BUTALBITAL, ACETAMINOPHEN AND CAFFEINE 50; 325; 40 MG/1; MG/1; MG/1
1 TABLET ORAL EVERY 4 HOURS PRN
Qty: 12 TABLET | Refills: 0 | Status: SHIPPED | OUTPATIENT
Start: 2020-06-16 | End: 2020-07-26 | Stop reason: SDUPTHER

## 2020-06-16 RX ORDER — MEPERIDINE HYDROCHLORIDE 25 MG/ML
25 INJECTION INTRAMUSCULAR; INTRAVENOUS; SUBCUTANEOUS ONCE
Status: COMPLETED | OUTPATIENT
Start: 2020-06-16 | End: 2020-06-16

## 2020-06-16 RX ADMIN — MEPERIDINE HYDROCHLORIDE 25 MG: 25 INJECTION INTRAMUSCULAR; INTRAVENOUS; SUBCUTANEOUS at 16:53

## 2020-06-16 RX ADMIN — PROMETHAZINE HYDROCHLORIDE 25 MG: 25 INJECTION INTRAMUSCULAR; INTRAVENOUS at 16:54

## 2020-06-16 ASSESSMENT — PAIN DESCRIPTION - LOCATION: LOCATION: HEAD

## 2020-06-16 ASSESSMENT — PAIN SCALES - GENERAL
PAINLEVEL_OUTOF10: 9
PAINLEVEL_OUTOF10: 9

## 2020-06-16 ASSESSMENT — ENCOUNTER SYMPTOMS
SINUS PRESSURE: 0
CONSTIPATION: 0
EYE DISCHARGE: 0
CHEST TIGHTNESS: 0
SORE THROAT: 0
SHORTNESS OF BREATH: 0
BACK PAIN: 0
COUGH: 0
DIARRHEA: 0
RHINORRHEA: 0
ABDOMINAL PAIN: 0
WHEEZING: 0
EYE REDNESS: 0
EYE PAIN: 0
TROUBLE SWALLOWING: 0
VOMITING: 0
NAUSEA: 0

## 2020-06-16 ASSESSMENT — PAIN DESCRIPTION - DESCRIPTORS: DESCRIPTORS: THROBBING;POUNDING

## 2020-06-16 ASSESSMENT — PAIN DESCRIPTION - PAIN TYPE: TYPE: ACUTE PAIN

## 2020-06-16 NOTE — ED PROVIDER NOTES
[address] [place] [address] Court  eMERGENCY dEPARTMENT eNCOUnter      Pt Name: Jacklyn Murray  MRN: [id number]  [place] 1965  Date of evaluation: 6/16/2020  Provider: Nikole Zapata MD    [address] [place] [address]       Chief Complaint   Patient presents with    Headache         HISTORY OF PRESENT ILLNESS   (Location/Symptom, Timing/Onset, Context/Setting, Quality, Duration, Modifying Factors, Severity)  Note limiting factors. Jacklyn Murray is a 53 y.o. male who presents to the emergency department because of a bad migraine headache, no relief despite advil. Patient has history of migraine and run out of fioricets. Nursing Notes were reviewed. REVIEW OF SYSTEMS    (2-9 systems for level 4, 10 or more forlevel 5)     Review of Systems   Constitutional: Negative for chills and fever. HENT: Negative for congestion, ear pain, postnasal drip, rhinorrhea, sinus pressure, sneezing, sore throat and trouble swallowing. Eyes: Negative for pain, discharge and redness. Respiratory: Negative for cough, chest tightness, shortness of breath and wheezing. Cardiovascular: Negative for chest pain, palpitations and leg swelling. Gastrointestinal: Negative for abdominal pain, constipation, diarrhea, nausea and vomiting. Genitourinary: Negative for dysuria, flank pain, frequency, hematuria and urgency. Musculoskeletal: Negative for back pain, joint swelling and neck pain. Skin: Negative for pallor and rash. Neurological: Positive for headaches. Negative for dizziness, syncope, weakness and numbness. Psychiatric/Behavioral: Negative for confusion and hallucinations. The patient is not nervous/anxious.             PAST MEDICAL HISTORY     Past Medical History:   Diagnosis Date    Asthma     Cancer ([facility]     melanoma on back    Chronic back pain     Depression     Elevated liver enzymes     Erectile dysfunction     Headache     Hyperlipidemia     Hypertension     Hypogonadism     [facility]an[facility] ([facility]     Memory loss     S/P Botox injection     neck x12, face x6    Type II or unspecified type diabetes mellitus without mention of complication, not stated as uncontrolled          SURGICAL HISTORY       Past Surgical History:   Procedure Laterality Date    SKIN CANCER EXCISION      back    TONSILLECTOMY           CURRENT MEDICATIONS       Previous Medications    ASPIRIN 81 MG EC TABLET    Take 81 mg by mouth daily. BUPROPION (WELLBUTRIN) 75 MG TABLET    Take 1 tablet by mouth 2 times daily. DULOXETINE (CYMBALTA) 60 MG CAPSULE    Take 1 capsule by mouth daily. GLUCOSE BLOOD VI TEST STRIPS (JALIL CONTOUR NEXT TEST) STRIP    1 each by Does not apply route daily. As needed. METFORMIN (GLUCOPHAGE) 1000 MG TABLET    Take 1 tablet by mouth 2 times daily (with meals). ONDANSETRON (ZOFRAN ODT) 4 MG DISINTEGRATING TABLET    Take 1 tablet by mouth every 8 hours as needed for Nausea or Vomiting    PROPRANOLOL (INDERAL) 20 MG TABLET    take 1 tablet by mouth twice a day       ALLERGIES     Patient has no known allergies.     FAMILY HISTORY       Family History   Problem Relation Age of Onset    Cancer Father     High Blood Pressure Father           SOCIAL HISTORY       Social History     Socioeconomic History    Marital status: Single     Spouse name: None    Number of children: None    Years of education: None    Highest education level: None   Occupational History    None   Social Needs    Financial resource strain: None    Food insecurity     Worry: None     Inability: None    Transportation needs     Medical: None     Non-medical: None   Tobacco Use    Smoking status: Never Smoker    Smokeless tobacco: Never Used   Substance and Sexual Activity    Alcohol use: No     Comment: quit 5 years ago    Drug use: No    Sexual activity: None   Lifestyle    Physical activity     Days per week: None     Minutes per session: None    Stress: None   Relationships    Social connections

## 2020-06-16 NOTE — ED NOTES
RN went over d/c instructions and med with pt. Educated on some side effects from migraines. Advised to make sure he follows up with his new neurologist. Milagros Cobb to follow up with PCP in 1-2 days. May return to ER if condition changes or worsens. States his mother is driving him. Offered to take him out in wheelchair, but he declined. No further questions or concerns. Pt ambulated out with no issues.       Padmini Gong RN  06/16/20 [id number]

## 2020-07-21 ENCOUNTER — HOSPITAL ENCOUNTER (EMERGENCY)
Facility: HOSPITAL | Age: 55
Discharge: HOME | End: 2020-07-21
Attending: EMERGENCY MEDICINE
Payer: MEDICARE

## 2020-07-21 VITALS
OXYGEN SATURATION: 97 % | WEIGHT: 185 LBS | HEIGHT: 68 IN | HEART RATE: 105 BPM | BODY MASS INDEX: 28.04 KG/M2 | RESPIRATION RATE: 20 BRPM | TEMPERATURE: 98.4 F | DIASTOLIC BLOOD PRESSURE: 103 MMHG | SYSTOLIC BLOOD PRESSURE: 151 MMHG

## 2020-07-21 PROCEDURE — 6360000002 HC RX W HCPCS: Performed by: EMERGENCY MEDICINE

## 2020-07-21 PROCEDURE — 99282 EMERGENCY DEPT VISIT SF MDM: CPT

## 2020-07-21 PROCEDURE — 96372 THER/PROPH/DIAG INJ SC/IM: CPT

## 2020-07-21 RX ORDER — PROMETHAZINE HYDROCHLORIDE 25 MG/ML
25 INJECTION, SOLUTION INTRAMUSCULAR; INTRAVENOUS ONCE
Status: COMPLETED | OUTPATIENT
Start: 2020-07-21 | End: 2020-07-21

## 2020-07-21 RX ORDER — MEPERIDINE HYDROCHLORIDE 25 MG/ML
25 INJECTION INTRAMUSCULAR; INTRAVENOUS; SUBCUTANEOUS ONCE
Status: COMPLETED | OUTPATIENT
Start: 2020-07-21 | End: 2020-07-21

## 2020-07-21 RX ADMIN — PROMETHAZINE HYDROCHLORIDE 25 MG: 25 INJECTION INTRAMUSCULAR; INTRAVENOUS at 14:29

## 2020-07-21 RX ADMIN — MEPERIDINE HYDROCHLORIDE 25 MG: 25 INJECTION INTRAMUSCULAR; INTRAVENOUS; SUBCUTANEOUS at 14:29

## 2020-07-21 ASSESSMENT — ENCOUNTER SYMPTOMS
SINUS PRESSURE: 0
CHEST TIGHTNESS: 0
WHEEZING: 0
TROUBLE SWALLOWING: 0
EYE REDNESS: 0
VOMITING: 0
BACK PAIN: 0
ABDOMINAL PAIN: 0
RHINORRHEA: 0
EYE PAIN: 0
COUGH: 0
NAUSEA: 0
SORE THROAT: 0
SHORTNESS OF BREATH: 0
CONSTIPATION: 0
EYE DISCHARGE: 0
DIARRHEA: 0

## 2020-07-21 ASSESSMENT — PAIN DESCRIPTION - ONSET: ONSET: ON-GOING

## 2020-07-21 ASSESSMENT — PAIN DESCRIPTION - PAIN TYPE: TYPE: ACUTE PAIN

## 2020-07-21 ASSESSMENT — PAIN SCALES - GENERAL
PAINLEVEL_OUTOF10: 8
PAINLEVEL_OUTOF10: 8

## 2020-07-21 ASSESSMENT — PAIN DESCRIPTION - FREQUENCY: FREQUENCY: CONTINUOUS

## 2020-07-21 ASSESSMENT — PAIN DESCRIPTION - LOCATION: LOCATION: HEAD

## 2020-07-21 ASSESSMENT — PAIN DESCRIPTION - PROGRESSION: CLINICAL_PROGRESSION: GRADUALLY WORSENING

## 2020-07-21 NOTE — ED NOTES
Discharge instructions reviewed and medications discussed with verbalized understanding from patient. Patient had no further questions or concerns.         Jocelin Gama RN  07/21/20 3928

## 2020-07-21 NOTE — ED PROVIDER NOTES
 Headache     Hyperlipidemia     Hypertension     Hypogonadism     Melanoma ([facility] [address]     Memory loss     S/P Botox injection     neck x12, face x6    Type II or unspecified type diabetes mellitus without mention of complication, not stated as uncontrolled          SURGICAL HISTORY       Past Surgical History:   Procedure Laterality Date    SKIN CANCER EXCISION      back    TONSILLECTOMY           CURRENT MEDICATIONS       Previous Medications    ASPIRIN 81 MG EC TABLET    Take 81 mg by mouth daily. BUPROPION (WELLBUTRIN) 75 MG TABLET    Take 1 tablet by mouth 2 times daily. BUTALBITAL-ACETAMINOPHEN-CAFFEINE (FIORICET, ESGIC) -40 MG PER TABLET    Take 1 tablet by mouth every 4 hours as needed for Headaches    DULOXETINE (CYMBALTA) 60 MG CAPSULE    Take 1 capsule by mouth daily. GLUCOSE BLOOD VI TEST STRIPS (JALIL CONTOUR NEXT TEST) STRIP    1 each by Does not apply route daily. As needed. METFORMIN (GLUCOPHAGE) 1000 MG TABLET    Take 1 tablet by mouth 2 times daily (with meals). ONDANSETRON (ZOFRAN ODT) 4 MG DISINTEGRATING TABLET    Take 1 tablet by mouth every 8 hours as needed for Nausea or Vomiting    PROPRANOLOL (INDERAL) 20 MG TABLET    take 1 tablet by mouth twice a day       ALLERGIES     Patient has no known allergies. FAMILY HISTORY       Family History   Problem Relation Age of Onset    Cancer Father     High Blood Pressure Father           SOCIAL HISTORY       Social History     Socioeconomic History    Marital status: Single     Spouse name: None    Number of children: None    Years of education: None    Highest education level: None   Occupational History    None   Social Needs    Financial resource strain: None    Food insecurity     Worry: None     Inability: None    Transportation needs     Medical: None     Non-medical: None   Tobacco Use    Smoking status: Never Smoker    Smokeless tobacco: Never Used   Substance and Sexual Activity    Alcohol use:  No Comment: quit 5 years ago    Drug use: No    Sexual activity: None   Lifestyle    Physical activity     Days per week: None     Minutes per session: None    Stress: None   Relationships    Social connections     Talks on phone: None     Gets together: None     Attends Orthodox service: None     Active member of club or organization: None     Attends meetings of clubs or organizations: None     Relationship status: None    Intimate partner violence     Fear of current or ex partner: None     Emotionally abused: None     Physically abused: None     Forced sexual activity: None   Other Topics Concern    None   Social History Narrative    None       SCREENINGS             PHYSICAL EXAM    (up to 7 for level 4, 8 or more for level 5)     ED Triage Vitals [07/21/20 1408]   BP Temp Temp Source Pulse Resp SpO2 Height Weight   (!) 151/103 98.4 °F (36.9 °C) Oral 105 20 97 % 5' 8\" (1.727 m) 185 lb (83.9 kg)       Physical Exam  Constitutional:       Appearance: He is well-developed. HENT:      Head: Normocephalic and atraumatic. Eyes:      Conjunctiva/sclera: Conjunctivae normal.      Pupils: Pupils are equal, round, and reactive to light. Neck:      Musculoskeletal: Neck supple. Cardiovascular:      Rate and Rhythm: Normal rate and regular rhythm. Pulmonary:      Effort: Pulmonary effort is normal.      Breath sounds: Normal breath sounds. Abdominal:      General: Bowel sounds are normal.      Palpations: Abdomen is soft. Musculoskeletal:         General: Tenderness present. Comments: right 5th toe- NV status intact. No deformity. Skin:     General: Skin is warm and dry. Comments: Erythema to right 5th toe. Neurological:      Mental Status: He is alert and oriented to person, place, and time.          DIAGNOSTIC RESULTS     EKG: All EKG's are interpreted by the Emergency Department Physician who either signs or Co-signs this chart in the absence of a cardiologist.        RADIOLOGY: Non-plain film images such as CT, Ultrasound and MRI are read by the radiologist. Plain radiographic images are visualized andpreliminarily interpreted by the emergency physician with the below findings:        Interpretationper the Radiologist below, if available at the time of this note:    No orders to display         ED BEDSIDE ULTRASOUND:   Performed by ED Physician - none    LABS:  Labs Reviewed - No data to display    All other labs were within normal range or not returned as of this dictation. EMERGENCY DEPARTMENT COURSE and DIFFERENTIAL DIAGNOSIS/MDM:   Vitals:    Vitals:    07/21/20 1408   BP: (!) 151/103   Pulse: 105   Resp: 20   Temp: 98.4 °F (36.9 °C)   TempSrc: Oral   SpO2: 97%   Weight: 185 lb (83.9 kg)   Height: 5' 8\" (1.727 m)           CRITICAL CARE TIME   Total Critical Care time was  minutes, excluding separatelyreportable procedures. There was a high probability ofclinically significant/life threatening deterioration in the patient's condition which required my urgent intervention. CONSULTS:  None    PROCEDURES:  None    PROGRESS NOTES:    Patient reassured right 5th toe not fractured. Demerol/phenergan IM. Follow up with PCP prn. FINAL IMPRESSION      1.  Migraine without aura and without status migrainosus, not intractable          Final diagnoses:   Migraine without aura and without status migrainosus, not intractable       DISPOSITION/PLAN   DISPOSITION Decision To Discharge 07/21/2020 02:14:25 PM      PATIENT REFERRED TO:  AUGIE Gaitan - CNP  [address]  [phone]      If symptoms worsen      DISCHARGE MEDICATIONS:  New Prescriptions    No medications on file         (Please note thatportions of this note may have been completed with a voice recognition program.  Efforts were [facility] edit the dictations but occasionally words are mis-transcribed.)    Tamera Trevizo MD (electronically signed)  Attending Emergency Physician        Madelin Rivera Shayla Gibson MD  07/21/20 1228

## 2020-07-26 ENCOUNTER — HOSPITAL ENCOUNTER (EMERGENCY)
Facility: HOSPITAL | Age: 55
Discharge: HOME | End: 2020-07-26
Attending: EMERGENCY MEDICINE
Payer: MEDICARE

## 2020-07-26 VITALS
RESPIRATION RATE: 16 BRPM | HEART RATE: 98 BPM | SYSTOLIC BLOOD PRESSURE: 134 MMHG | TEMPERATURE: 98.2 F | DIASTOLIC BLOOD PRESSURE: 92 MMHG | BODY MASS INDEX: 27.28 KG/M2 | HEIGHT: 68 IN | WEIGHT: 180 LBS | OXYGEN SATURATION: 96 %

## 2020-07-26 PROCEDURE — 6360000002 HC RX W HCPCS: Performed by: EMERGENCY MEDICINE

## 2020-07-26 PROCEDURE — 99282 EMERGENCY DEPT VISIT SF MDM: CPT

## 2020-07-26 PROCEDURE — 96372 THER/PROPH/DIAG INJ SC/IM: CPT

## 2020-07-26 RX ORDER — MEPERIDINE HYDROCHLORIDE 25 MG/ML
25 INJECTION INTRAMUSCULAR; INTRAVENOUS; SUBCUTANEOUS ONCE
Status: COMPLETED | OUTPATIENT
Start: 2020-07-26 | End: 2020-07-26

## 2020-07-26 RX ORDER — PROMETHAZINE HYDROCHLORIDE 25 MG/ML
25 INJECTION, SOLUTION INTRAMUSCULAR; INTRAVENOUS ONCE
Status: COMPLETED | OUTPATIENT
Start: 2020-07-26 | End: 2020-07-26

## 2020-07-26 RX ORDER — BUTALBITAL, ACETAMINOPHEN AND CAFFEINE 50; 325; 40 MG/1; MG/1; MG/1
1 TABLET ORAL EVERY 4 HOURS PRN
Qty: 12 TABLET | Refills: 0 | Status: ON HOLD | OUTPATIENT
Start: 2020-07-26 | End: 2020-10-01

## 2020-07-26 RX ADMIN — MEPERIDINE HYDROCHLORIDE 25 MG: 25 INJECTION INTRAMUSCULAR; INTRAVENOUS; SUBCUTANEOUS at 11:02

## 2020-07-26 RX ADMIN — PROMETHAZINE HYDROCHLORIDE 25 MG: 25 INJECTION INTRAMUSCULAR; INTRAVENOUS at 11:02

## 2020-07-26 ASSESSMENT — ENCOUNTER SYMPTOMS
WHEEZING: 0
RHINORRHEA: 0
NAUSEA: 0
COUGH: 0
EYE DISCHARGE: 0
EYE REDNESS: 0
SHORTNESS OF BREATH: 0
TROUBLE SWALLOWING: 0
SINUS PRESSURE: 0
ABDOMINAL PAIN: 0
SORE THROAT: 0
EYE PAIN: 0
CHEST TIGHTNESS: 0
BACK PAIN: 0
DIARRHEA: 0
VOMITING: 0
CONSTIPATION: 0

## 2020-07-26 ASSESSMENT — PAIN DESCRIPTION - LOCATION
LOCATION: HEAD
LOCATION: HEAD

## 2020-07-26 ASSESSMENT — PAIN - FUNCTIONAL ASSESSMENT: PAIN_FUNCTIONAL_ASSESSMENT: 0-10

## 2020-07-26 ASSESSMENT — PAIN DESCRIPTION - DESCRIPTORS
DESCRIPTORS: ACHING
DESCRIPTORS: ACHING

## 2020-07-26 ASSESSMENT — PAIN SCALES - GENERAL
PAINLEVEL_OUTOF10: 8
PAINLEVEL_OUTOF10: 8
PAINLEVEL_OUTOF10: 4

## 2020-07-26 ASSESSMENT — PAIN DESCRIPTION - PAIN TYPE: TYPE: ACUTE PAIN

## 2020-07-26 NOTE — ED PROVIDER NOTES
[address] [place] [address] Court  eMERGENCY dEPARTMENT eNCOUnter      Pt Name: Alvaro Pickett  MRN: [id number]  [place] 1965  Date of evaluation: 7/26/2020  Provider: Jill Caruso MD    [address] [place] [address]       Chief Complaint   Patient presents with    Migraine         HISTORY OF PRESENT ILLNESS   (Location/Symptom, Timing/Onset, Context/Setting, Quality, Duration, Modifying Factors, Severity)  Note limiting factors. Alvaro Pickett is a 53 y.o. male who presents to the emergency department because of a migraine headache this am. Patient says he's out of hie fioricet. No fever. Nursing Notes were reviewed. REVIEW OF SYSTEMS    (2-9 systems for level 4, 10 or more forlevel 5)     Review of Systems   Constitutional: Negative for chills and fever. HENT: Negative for congestion, ear pain, postnasal drip, rhinorrhea, sinus pressure, sneezing, sore throat and trouble swallowing. Eyes: Negative for pain, discharge and redness. Respiratory: Negative for cough, chest tightness, shortness of breath and wheezing. Cardiovascular: Negative for chest pain, palpitations and leg swelling. Gastrointestinal: Negative for abdominal pain, constipation, diarrhea, nausea and vomiting. Genitourinary: Negative for dysuria, flank pain, frequency, hematuria and urgency. Musculoskeletal: Negative for back pain, joint swelling and neck pain. Skin: Negative for pallor and rash. Neurological: Positive for headaches. Negative for dizziness, syncope, weakness and numbness. Psychiatric/Behavioral: Negative for confusion and hallucinations. The patient is not nervous/anxious.             PAST MEDICAL HISTORY     Past Medical History:   Diagnosis Date    Asthma     Cancer ([facility] [address]     melanoma on back    Chronic back pain     Depression     Elevated liver enzymes     Erectile dysfunction     Headache     Hyperlipidemia     Hypertension     Hypogonadism     Melanoma ([facility] [address]     Memory loss     S/P Botox injection     neck x12, face x6    Type II or unspecified type diabetes mellitus without mention of complication, not stated as uncontrolled          SURGICAL HISTORY       Past Surgical History:   Procedure Laterality Date    SKIN CANCER EXCISION      back    TONSILLECTOMY           CURRENT MEDICATIONS       Current Discharge Medication List      CONTINUE these medications which have NOT CHANGED    Details   ondansetron (ZOFRAN ODT) 4 MG disintegrating tablet Take 1 tablet by mouth every 8 hours as needed for Nausea or Vomiting  Qty: 9 tablet, Refills: 0      propranolol (INDERAL) 20 MG tablet take 1 tablet by mouth twice a day  Qty: 60 tablet, Refills: 0      buPROPion (WELLBUTRIN) 75 MG tablet Take 1 tablet by mouth 2 times daily. Qty: 60 tablet, Refills: 1      metformin (GLUCOPHAGE) 1000 MG tablet Take 1 tablet by mouth 2 times daily (with meals). Qty: 60 tablet, Refills: 3      DULoxetine (CYMBALTA) 60 MG capsule Take 1 capsule by mouth daily. Qty: 30 capsule, Refills: 6      aspirin 81 MG EC tablet Take 81 mg by mouth daily. glucose blood VI test strips (JALIL CONTOUR NEXT TEST) strip 1 each by Does not apply route daily. As needed. Qty: 100 strip, Refills: 6             ALLERGIES     Patient has no known allergies. FAMILY HISTORY       Family History   Problem Relation Age of Onset    Cancer Father     High Blood Pressure Father           SOCIAL HISTORY       Social History     Socioeconomic History    Marital status: Single     Spouse name: None    Number of children: None    Years of education: None    Highest education level: None   Occupational History    None   Social Needs    Financial resource strain: None    Food insecurity     Worry: None     Inability: None    Transportation needs     Medical: None     Non-medical: None   Tobacco Use    Smoking status: Never Smoker    Smokeless tobacco: Never Used   Substance and Sexual Activity    Alcohol use:  No Comment: quit 5 years ago    Drug use: No    Sexual activity: None   Lifestyle    Physical activity     Days per week: None     Minutes per session: None    Stress: None   Relationships    Social connections     Talks on phone: None     Gets together: None     Attends Pentecostal service: None     Active member of club or organization: None     Attends meetings of clubs or organizations: None     Relationship status: None    Intimate partner violence     Fear of current or ex partner: None     Emotionally abused: None     Physically abused: None     Forced sexual activity: None   Other Topics Concern    None   Social History Narrative    None       SCREENINGS             PHYSICAL EXAM    (up to 7 for level 4, 8 or more for level 5)     ED Triage Vitals   BP Temp Temp Source Pulse Resp SpO2 Height Weight   07/26/20 0940 07/26/20 0937 07/26/20 0937 07/26/20 0937 07/26/20 0937 07/26/20 0937 07/26/20 0937 07/26/20 0937   (!) 148/97 98.2 °F (36.8 °C) Temporal 104 20 99 % 5' 8\" (1.727 m) 180 lb (81.6 kg)       Physical Exam  Constitutional:       Appearance: He is well-developed. HENT:      Head: Normocephalic and atraumatic. Eyes:      Conjunctiva/sclera: Conjunctivae normal.      Pupils: Pupils are equal, round, and reactive to light. Neck:      Musculoskeletal: Neck supple. Cardiovascular:      Rate and Rhythm: Normal rate and regular rhythm. Pulmonary:      Effort: Pulmonary effort is normal.      Breath sounds: Normal breath sounds. Abdominal:      General: Bowel sounds are normal.      Palpations: Abdomen is soft. Musculoskeletal: Normal range of motion. Skin:     General: Skin is warm and dry. Neurological:      General: No focal deficit present. Mental Status: He is alert and oriented to person, place, and time. Mental status is at baseline.          DIAGNOSTIC RESULTS     EKG: All EKG's are interpreted by the Emergency Department Physician who either signs or Co-signs this chart in the absence of a cardiologist.        RADIOLOGY:   Non-plain film images such as CT, Ultrasound and MRI are read by the radiologist. Plain radiographic images are visualized andpreliminarily interpreted by the emergency physician with the below findings:        Interpretationper the Radiologist below, if available at the time of this note:    No orders to display         ED BEDSIDE ULTRASOUND:   Performed by ED Physician - none    LABS:  Labs Reviewed - No data to display    All other labs were within normal range or not returned as of this dictation. EMERGENCY DEPARTMENT COURSE and DIFFERENTIAL DIAGNOSIS/MDM:   Vitals:    Vitals:    07/26/20 0937 07/26/20 0940   BP:  (!) 148/97   Pulse: 104 102   Resp: 20    Temp: 98.2 °F (36.8 °C)    TempSrc: Temporal    SpO2: 99% 97%   Weight: 180 lb (81.6 kg)    Height: 5' 8\" (1.727 m)            CRITICAL CARE TIME   Total Critical Care time was  minutes, excluding separatelyreportable procedures. There was a high probability ofclinically significant/life threatening deterioration in the patient's condition which required my urgent intervention. CONSULTS:  None    PROCEDURES:  None    PROGRESS NOTES:    Demerol/Phenergan IM. fioricet prn. Follow up with PCP prn. FINAL IMPRESSION      1. Migraine without aura and without status migrainosus, not intractable    2.  Migraine without status migrainosus, not intractable, unspecified migraine type          Final diagnoses:   Migraine without aura and without status migrainosus, not intractable       DISPOSITION/PLAN   DISPOSITION Decision To Discharge 07/26/2020 10:34:28 AM      PATIENT REFERRED TO:  AUGIE Mcclelland - CNP  [address]  [phone]      If symptoms worsen      DISCHARGE MEDICATIONS:  Current Discharge Medication List            (Please note thatportions of this note may have been completed with a voice recognition program.  Efforts were [facility] edit the dictations but occasionally words are mis-transcribed.)    Kaylene Shoemaker MD (electronically signed)  Attending Emergency Physician        Luis Nowak MD  07/26/20 1965

## 2020-07-26 NOTE — ED TRIAGE NOTES
Pt arrives to er with c/omigraine that started yesterday and got real bad overnight.   Pt positioned for  Comfort, warm blanket applied, lights dimmed for comfort

## 2020-08-01 ENCOUNTER — HOSPITAL ENCOUNTER (EMERGENCY)
Facility: HOSPITAL | Age: 55
Discharge: HOME | End: 2020-08-01
Attending: EMERGENCY MEDICINE
Payer: MEDICARE

## 2020-08-01 VITALS
TEMPERATURE: 98.8 F | DIASTOLIC BLOOD PRESSURE: 92 MMHG | HEIGHT: 68 IN | OXYGEN SATURATION: 96 % | WEIGHT: 180 LBS | SYSTOLIC BLOOD PRESSURE: 137 MMHG | RESPIRATION RATE: 18 BRPM | BODY MASS INDEX: 27.28 KG/M2 | HEART RATE: 102 BPM

## 2020-08-01 PROCEDURE — 96372 THER/PROPH/DIAG INJ SC/IM: CPT

## 2020-08-01 PROCEDURE — 6360000002 HC RX W HCPCS: Performed by: EMERGENCY MEDICINE

## 2020-08-01 PROCEDURE — 99282 EMERGENCY DEPT VISIT SF MDM: CPT

## 2020-08-01 RX ORDER — PROMETHAZINE HYDROCHLORIDE 25 MG/ML
25 INJECTION, SOLUTION INTRAMUSCULAR; INTRAVENOUS ONCE
Status: COMPLETED | OUTPATIENT
Start: 2020-08-01 | End: 2020-08-01

## 2020-08-01 RX ORDER — MEPERIDINE HYDROCHLORIDE 25 MG/ML
25 INJECTION INTRAMUSCULAR; INTRAVENOUS; SUBCUTANEOUS ONCE
Status: COMPLETED | OUTPATIENT
Start: 2020-08-01 | End: 2020-08-01

## 2020-08-01 RX ORDER — BUTALBITAL, ACETAMINOPHEN AND CAFFEINE 50; 325; 40 MG/1; MG/1; MG/1
1 TABLET ORAL EVERY 4 HOURS PRN
Qty: 12 TABLET | Refills: 0 | Status: ON HOLD | OUTPATIENT
Start: 2020-08-01 | End: 2020-10-01

## 2020-08-01 RX ADMIN — PROMETHAZINE HYDROCHLORIDE 25 MG: 25 INJECTION INTRAMUSCULAR; INTRAVENOUS at 20:38

## 2020-08-01 RX ADMIN — MEPERIDINE HYDROCHLORIDE 25 MG: 25 INJECTION INTRAMUSCULAR; INTRAVENOUS; SUBCUTANEOUS at 20:38

## 2020-08-01 ASSESSMENT — PAIN SCALES - GENERAL
PAINLEVEL_OUTOF10: 8
PAINLEVEL_OUTOF10: 8

## 2020-08-01 ASSESSMENT — ENCOUNTER SYMPTOMS: PHOTOPHOBIA: 1

## 2020-08-01 NOTE — ED TRIAGE NOTES
Patient c/o migraine headache since 0600am. Patient with history of migraine headaches. Has had botox injections to the face and neck in the past. States was supposed to have a prescription for imitrex but \"they have not filled it yet\". Patient has taken 2 aleve today with no relief. Patient c/o light sensitivity.
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
## 2020-08-02 NOTE — ED NOTES
D/c instructions provided, prescription given. Questions answered. No further needs noted at this time. Patient off unit ambulatory to POV, d/c home at this time.       Obed Malcolm RN  08/01/20 8319

## 2020-08-28 ENCOUNTER — HOSPITAL ENCOUNTER (EMERGENCY)
Facility: HOSPITAL | Age: 55
Discharge: LEFT BEFORE DISCHARGE | End: 2020-08-28
Attending: HOSPITALIST
Payer: MEDICARE

## 2020-08-28 VITALS
RESPIRATION RATE: 18 BRPM | OXYGEN SATURATION: 100 % | WEIGHT: 180 LBS | HEIGHT: 68 IN | TEMPERATURE: 98.4 F | HEART RATE: 108 BPM | SYSTOLIC BLOOD PRESSURE: 150 MMHG | BODY MASS INDEX: 27.28 KG/M2 | DIASTOLIC BLOOD PRESSURE: 97 MMHG

## 2020-08-28 PROCEDURE — 99283 EMERGENCY DEPT VISIT LOW MDM: CPT

## 2020-08-28 PROCEDURE — 99282 EMERGENCY DEPT VISIT SF MDM: CPT

## 2020-08-28 ASSESSMENT — PAIN DESCRIPTION - LOCATION: LOCATION: HAND

## 2020-08-28 ASSESSMENT — PAIN DESCRIPTION - PAIN TYPE: TYPE: ACUTE PAIN

## 2020-08-28 ASSESSMENT — PAIN DESCRIPTION - FREQUENCY: FREQUENCY: CONTINUOUS

## 2020-08-28 ASSESSMENT — PAIN SCALES - GENERAL: PAINLEVEL_OUTOF10: 8

## 2020-08-28 ASSESSMENT — PAIN DESCRIPTION - DESCRIPTORS: DESCRIPTORS: POUNDING

## 2020-08-28 ASSESSMENT — PAIN DESCRIPTION - ONSET: ONSET: ON-GOING

## 2020-08-28 NOTE — ED TRIAGE NOTES
Patient states he has had a migraine since Wednesday. He states his pain is an 8/10. Patient describes his pain like \"someone is hitting him in the back of the head with a hammer\". Patient states he is nauseous, patient states he has sensitivity to light and sound.  Milagro Winston Student Nurse

## 2020-08-28 NOTE — ED PROVIDER NOTES
[address] ENCOUNTER      Pt Name: Sweetie Capone  MRN: [id number]  YOB: 1965  Date of evaluation: 8/28/2020  Provider: Bharath Koch DO    CHIEF COMPLAINT       Chief Complaint   Patient presents with    Migraine         HISTORY OF PRESENT ILLNESS  (Location/Symptom, Timing/Onset, Context/Setting, Quality, Duration, Modifying Factors, Severity.)   Sweetie Capone is a 63 y.o. male who presents to the emergency department for chronic migraine. Patient states that his a headache started 2 days ago. He normally uses Imitrex at home but states is not been able to get that medication for the past month because it has been messing with his insurance. Patient states he has had some nausea but denies any vomiting with the symptoms. Denies any visual changes. He states he is photo and phonophobic with a headache but denies any hypersensitivity to odors. Denies any fall trauma or injury to his head recently. Patient has been seen here several times in the past for his chronic migraines. Denies any numbness tingling weakness of extremities out of ordinary. Denies any loss of bowel or bladder control. Denies any chest pain or shortness of breath. Denies any other symptoms at this time. Nursing notes were reviewed. REVIEW OFSYSTEMS    (2-9 systems for level 4, 10 or more for level 5)   ROS:  General:  No fevers, no chills, no weakness  Cardiovascular:  No chest pain, no palpitations  Respiratory:  No shortness of breath, no cough, no wheezing  Gastrointestinal:  No pain, + nausea, no vomiting, no diarrhea  Musculoskeletal:  No muscle pain, no joint pain  Skin:  No rash, no easy bruising  Neurologic:  No speech problems, + headache, no extremity weakness  Psychiatric:  No anxiety  Genitourinary:  No dysuria, no hematuria    Except as noted above the remainder of the review of systems was reviewed and negative.        PAST MEDICAL HISTORY     Past Medical History:   Diagnosis Date    Asthma     Cancer ([facility] [facility]     melanoma on back    Chronic back pain     Depression     Elevated liver enzymes     Erectile dysfunction     Headache     Hyperlipidemia     Hypertension     Hypogonadism     Melanoma ([facility]     Memory loss     S/P Botox injection     neck x12, face x6    Type II or unspecified type diabetes mellitus without mention of complication, not stated as uncontrolled          SURGICAL HISTORY       Past Surgical History:   Procedure Laterality Date    SKIN CANCER EXCISION      back    TONSILLECTOMY           CURRENT MEDICATIONS       Previous Medications    ASPIRIN 81 MG EC TABLET    Take 81 mg by mouth daily. BUPROPION (WELLBUTRIN) 75 MG TABLET    Take 1 tablet by mouth 2 times daily. BUTALBITAL-ACETAMINOPHEN-CAFFEINE (FIORICET, ESGIC) -40 MG PER TABLET    Take 1 tablet by mouth every 4 hours as needed for Headaches    BUTALBITAL-ACETAMINOPHEN-CAFFEINE (FIORICET, ESGIC) -40 MG PER TABLET    Take 1 tablet by mouth every 4 hours as needed for Headaches    DULOXETINE (CYMBALTA) 60 MG CAPSULE    Take 1 capsule by mouth daily. GLUCOSE BLOOD VI TEST STRIPS (JALIL CONTOUR NEXT TEST) STRIP    1 each by Does not apply route daily. As needed. METFORMIN (GLUCOPHAGE) 1000 MG TABLET    Take 1 tablet by mouth 2 times daily (with meals). ONDANSETRON (ZOFRAN ODT) 4 MG DISINTEGRATING TABLET    Take 1 tablet by mouth every 8 hours as needed for Nausea or Vomiting    PROPRANOLOL (INDERAL) 20 MG TABLET    take 1 tablet by mouth twice a day       ALLERGIES     Patient has no known allergies.     FAMILY HISTORY       Family History   Problem Relation Age of Onset    Cancer Father     High Blood Pressure Father           SOCIAL HISTORY       Social History     Socioeconomic History    Marital status: Single     Spouse name: Not on file    Number of children: Not on file    Years of education: Not on file    Highest education level: Not on file   Occupational History    Not on file   Social Needs    Financial resource strain: Not on file    Food insecurity     Worry: Not on file     Inability: Not on file    Transportation needs     Medical: Not on file     Non-medical: Not on file   Tobacco Use    Smoking status: Never Smoker    Smokeless tobacco: Never Used   Substance and Sexual Activity    Alcohol use: No     Comment: quit 5 years ago    Drug use: No    Sexual activity: Not on file   Lifestyle    Physical activity     Days per week: Not on file     Minutes per session: Not on file    Stress: Not on file   Relationships    Social connections     Talks on phone: Not on file     Gets together: Not on file     Attends Buddhist service: Not on file     Active member of club or organization: Not on file     Attends meetings of clubs or organizations: Not on file     Relationship status: Not on file    Intimate partner violence     Fear of current or ex partner: Not on file     Emotionally abused: Not on file     Physically abused: Not on file     Forced sexual activity: Not on file   Other Topics Concern    Not on file   Social History Narrative    Not on file         PHYSICAL EXAM    (up to 7 for level 4, 8 or more for level 5)     ED Triage Vitals   BP Temp Temp src Pulse Resp SpO2 Height Weight   -- -- -- -- -- -- -- --       Physical Exam  General :Patient is awake, alert, oriented, in no acute distress, nontoxic appearing  HEENT: Pupils are equally round and reactive to light, EOMI, conjunctivae clear. Oral mucosa is moist, no exudate. Uvula is midline  Cardiac: Heart regular rate, rhythm, no murmurs, rubs, or gallops  Lungs: Lungs are clear to auscultation, there is no wheezing, rhonchi, or rales. There is no use of accessory muscles. Abdomen: Abdomen is soft, nontender, nondistended. There is no firm or pulsatile masses, no rebound rigidity or guarding.    Musculoskeletal: 5 out of 5 strength in all 4 DISPOSITION [place] 08/28/2020 03:49:50 PM      PATIENT REFERRED TO:  AUGIE Steele CNP  [address]  [phone]    In 2 days      Formerly [facility] and [facility]  [facility] [address] [facility]  [phone]    As needed, If symptoms worsen      DISCHARGE MEDICATIONS:  New Prescriptions    No medications on file       Comment: Please note this report has been produced using speech recognition software and may contain errorsrelated to that system including errors in grammar, punctuation, and spelling, as well as words and phrases that may be inappropriate. If there are any questions or concerns please feel free to contact the dictating providerfor clarification.     Sid Jeronimo DO  Attending Emergency Physician              Sid Jeronimo DO  08/28/20 [id number]

## 2020-09-22 NOTE — PROGRESS NOTES
Patient: Sumit Echeverria    YOB: 1965    Date: 09/24/2020    Primary Care Provider: Drake Oscar MD    Chief Complaint   Patient presents with   • Colonoscopy     anemia       SUBJECTIVE:    History of present illness:  I saw the patient in the office today as a consultation for evaluation and treatment of anemia. Patient is complaining of fatigue and weakness.  Patient also has epigastric pain with reflux symptoms, heartburn fairly severe.  No previous EGD or colonoscopy.  No weight loss or nausea vomiting.  No family history of colon cancer.    The following portions of the patient's history were reviewed and updated as appropriate: allergies, current medications, past family history, past medical history, past social history, past surgical history and problem list.    Review of Systems   Constitutional: Negative for chills, fever and unexpected weight change.   HENT: Negative for trouble swallowing and voice change.    Eyes: Negative for visual disturbance.   Respiratory: Negative for apnea, cough, chest tightness, shortness of breath and wheezing.    Cardiovascular: Negative for chest pain, palpitations and leg swelling.   Gastrointestinal: Negative for abdominal distention, abdominal pain, anal bleeding, blood in stool, constipation, diarrhea, nausea, rectal pain and vomiting.   Endocrine: Negative for cold intolerance and heat intolerance.   Genitourinary: Negative for difficulty urinating, dysuria, flank pain, scrotal swelling and testicular pain.   Musculoskeletal: Negative for back pain, gait problem and joint swelling.   Skin: Negative for color change, rash and wound.   Neurological: Negative for dizziness, syncope, speech difficulty, weakness, numbness and headaches.   Hematological: Negative for adenopathy. Does not bruise/bleed easily.   Psychiatric/Behavioral: Negative for confusion. The patient is not nervous/anxious.        History:  Past Medical History:   Diagnosis Date    • Anxiety    • Cancer (CMS/HCC)    • Depression    • Diabetes mellitus (CMS/HCC)    • Hyperlipidemia    • Hypertension    • Migraine        Past Surgical History:   Procedure Laterality Date   • AXILLARY LYMPH NODE BIOPSY/EXCISION     • SKIN CANCER EXCISION  2014       Family History   Problem Relation Age of Onset   • Cancer Father    • Hypertension Brother        Social History     Tobacco Use   • Smoking status: Never Smoker   • Smokeless tobacco: Never Used   Substance Use Topics   • Alcohol use: No   • Drug use: No       Medications:   Current Outpatient Medications:   •  amitriptyline (ELAVIL) 10 MG tablet, Take 1 tablet by mouth Every Night., Disp: 30 tablet, Rfl: 2  •  aspirin 81 MG EC tablet, Take  by mouth., Disp: , Rfl:   •  buPROPion (WELLBUTRIN) 75 MG tablet, Take  by mouth. Take one tablet by mouth twice daily, Disp: , Rfl:   •  buPROPion SR (Wellbutrin SR) 100 MG 12 hr tablet, Wellbutrin SR, Disp: , Rfl:   •  cyclobenzaprine (FLEXERIL) 10 MG tablet, , Disp: , Rfl: 3  •  glucose blood test strip, , Disp: , Rfl:   •  glucose blood test strip, OneTouch Ultra Blue Test Strip, Disp: , Rfl:   •  METFORMIN & DIET MANAGE PROD PO, metformin, Disp: , Rfl:   •  propranolol (INDERAL) 1 MG/ML injection, propranolol, Disp: , Rfl:   •  cefdinir (OMNICEF) 300 MG capsule, , Disp: , Rfl: 0  •  DULoxetine (Cymbalta) 20 MG capsule, Cymbalta, Disp: , Rfl:   •  DULoxetine (CYMBALTA) 60 MG capsule, Take  by mouth. Take one capsule by mouth daily, Disp: , Rfl:   •  HYDROcodone-acetaminophen (NORCO)  MG per tablet, Take  by mouth., Disp: , Rfl:   •  ibuprofen (ADVIL,MOTRIN) 600 MG tablet, take 1 tablet by mouth twice a day if needed, Disp: , Rfl: 0  •  metFORMIN (GLUCOPHAGE) 1000 MG tablet, Take  by mouth. Take one tablet by mouth twice daily, Disp: , Rfl:   •  MethylPREDNISolone (MEDROL, JUDITH,) 4 MG tablet, , Disp: , Rfl: 0  •  OnabotulinumtoxinA 200 units reconstituted solution, INJECT 155 UNITS IM TO HEAD AND NECK  "AREA EVERY 12 WEEKS, Disp: 1 each, Rfl: 4  •  propranolol (INDERAL) 10 MG tablet, , Disp: , Rfl: 0  •  propranolol (INDERAL) 20 MG tablet, take 1 tablet by mouth twice a day, Disp: , Rfl:   •  tiZANidine (ZANAFLEX) 4 MG tablet, Take 1 tablet by mouth 3 (Three) Times a Day., Disp: 90 tablet, Rfl: 0    Current Facility-Administered Medications:   •  bupivacaine (MARCAINE) injection 5 mL, 5 mL, Injection, Once, Yael Pires APRN  •  methylPREDNISolone acetate (DEPO-medrol) injection 200 mg, 200 mg, Intramuscular, Once, Yael Pires APRN  •  OnabotulinumtoxinA 155 Units, 155 Units, Intramuscular, Once, Yael Pires APRN       Allergies: No Known Allergies    OBJECTIVE:    Vital Signs:  Vitals:    09/24/20 0914   BP: 117/83   Pulse: 90   Temp: 98 °F (36.7 °C)   SpO2: 98%   Weight: 81.6 kg (180 lb)   Height: 172.7 cm (68\")       Physical Exam:   General Appearance:    Alert, cooperative, in no acute distress   Head:    Normocephalic, without obvious abnormality, atraumatic   Eyes:            Lids and lashes normal, conjunctivae and sclerae normal, no   icterus, no pallor, corneas clear, PERRL   Ears:    Ears appear intact with no abnormalities noted   Throat:   No oral lesions, no thrush, oral mucosa moist   Neck:   No adenopathy, supple, trachea midline, no thyromegaly,  no JVD   Lungs:     Clear to auscultation,respirations regular, even and                  unlabored    Heart:    Regular rhythm and normal rate, normal S1 and S2, no            murmur   Abdomen:     no masses, no organomegaly, soft and tender epigastric region with no guarding or rebound.  No abdominal wall hernias.   Extremities:   Moves all extremities well, no edema, no cyanosis, no             redness   Pulses:   Pulses palpable and equal bilaterally   Skin:   No bleeding, bruising or rash   Lymph nodes:   No palpable adenopathy   Neurologic:   Cranial nerves 2 - 12 grossly intact, sensation intact  Psychiatric: No evidence of depression " or anxiety   Results Review:   I reviewed the patient's new clinical results.    Review of Systems was reviewed and confirmed as accurate as documented by the MA.    ASSESSMENT/PLAN:    1. Iron deficiency anemia due to chronic blood loss    2. Gastroesophageal reflux disease, esophagitis presence not specified        I recommend a colonoscopy and EGD for further evaluation. The procedure was explained as well as the risks which include but are not limited to bleeding, infection, perforation, abdominal pain etc. The patient understands these risks and the procedure and wishes to proceed.  Continue PPI medication.  Avoid caffeine alcohol and nicotine.     Electronically signed by Bebeto Chavez MD  09/24/20  15:35 EDT

## 2020-09-24 ENCOUNTER — OFFICE VISIT (OUTPATIENT)
Dept: SURGERY | Facility: CLINIC | Age: 55
End: 2020-09-24

## 2020-09-24 VITALS
HEIGHT: 68 IN | OXYGEN SATURATION: 98 % | DIASTOLIC BLOOD PRESSURE: 83 MMHG | BODY MASS INDEX: 27.28 KG/M2 | HEART RATE: 90 BPM | SYSTOLIC BLOOD PRESSURE: 117 MMHG | WEIGHT: 180 LBS | TEMPERATURE: 98 F

## 2020-09-24 DIAGNOSIS — K21.9 GASTROESOPHAGEAL REFLUX DISEASE, ESOPHAGITIS PRESENCE NOT SPECIFIED: ICD-10-CM

## 2020-09-24 DIAGNOSIS — Z01.818 PREOP TESTING: Primary | ICD-10-CM

## 2020-09-24 DIAGNOSIS — D50.0 IRON DEFICIENCY ANEMIA DUE TO CHRONIC BLOOD LOSS: Primary | ICD-10-CM

## 2020-09-24 PROCEDURE — 99203 OFFICE O/P NEW LOW 30 MIN: CPT | Performed by: SURGERY

## 2020-09-24 RX ORDER — BUPROPION HYDROCHLORIDE 100 MG/1
TABLET, EXTENDED RELEASE ORAL
COMMUNITY

## 2020-09-24 RX ORDER — PROPRANOLOL HYDROCHLORIDE 1 MG/ML
INJECTION, SOLUTION INTRAVENOUS
COMMUNITY

## 2020-09-24 RX ORDER — POLYETHYLENE GLYCOL 3350 17 G/17G
POWDER, FOR SOLUTION ORAL
Qty: 238 G | Refills: 0 | Status: SHIPPED | OUTPATIENT
Start: 2020-09-24

## 2020-09-24 RX ORDER — BISACODYL 5 MG
TABLET, DELAYED RELEASE (ENTERIC COATED) ORAL
Qty: 4 TABLET | Refills: 0 | Status: SHIPPED | OUTPATIENT
Start: 2020-09-24

## 2020-09-24 RX ORDER — DULOXETIN HYDROCHLORIDE 20 MG/1
CAPSULE, DELAYED RELEASE ORAL
COMMUNITY

## 2020-09-29 ENCOUNTER — HOSPITAL ENCOUNTER (OUTPATIENT)
Facility: HOSPITAL | Age: 55
Discharge: HOME | End: 2020-09-29
Payer: MEDICARE

## 2020-09-29 LAB — SARS-COV-2, NAAT: NOT DETECTED

## 2020-09-29 PROCEDURE — U0002 COVID-19 LAB TEST NON-CDC: HCPCS

## 2020-10-01 ENCOUNTER — OUTSIDE FACILITY SERVICE (OUTPATIENT)
Dept: SURGERY | Facility: CLINIC | Age: 55
End: 2020-10-01

## 2020-10-01 ENCOUNTER — ANESTHESIA EVENT (OUTPATIENT)
Dept: ENDOSCOPY | Facility: HOSPITAL | Age: 55
End: 2020-10-01
Payer: MEDICARE

## 2020-10-01 ENCOUNTER — HOSPITAL ENCOUNTER (OUTPATIENT)
Facility: HOSPITAL | Age: 55
Setting detail: OUTPATIENT SURGERY
Discharge: HOME | End: 2020-10-01
Attending: SURGERY | Admitting: SURGERY
Payer: MEDICARE

## 2020-10-01 ENCOUNTER — ANESTHESIA (OUTPATIENT)
Dept: ENDOSCOPY | Facility: HOSPITAL | Age: 55
End: 2020-10-01
Payer: MEDICARE

## 2020-10-01 VITALS
DIASTOLIC BLOOD PRESSURE: 67 MMHG | OXYGEN SATURATION: 93 % | SYSTOLIC BLOOD PRESSURE: 101 MMHG | RESPIRATION RATE: 11 BRPM

## 2020-10-01 VITALS
TEMPERATURE: 96.9 F | SYSTOLIC BLOOD PRESSURE: 110 MMHG | WEIGHT: 180 LBS | BODY MASS INDEX: 27.28 KG/M2 | HEART RATE: 87 BPM | HEIGHT: 68 IN | DIASTOLIC BLOOD PRESSURE: 74 MMHG | RESPIRATION RATE: 16 BRPM | OXYGEN SATURATION: 95 %

## 2020-10-01 DIAGNOSIS — Z01.818 PREOP TESTING: Primary | ICD-10-CM

## 2020-10-01 LAB
GLUCOSE BLD-MCNC: 167 MG/DL (ref 74–106)
PERFORMED ON: ABNORMAL

## 2020-10-01 PROCEDURE — 2500000003 HC RX 250 WO HCPCS: Performed by: NURSE ANESTHETIST, CERTIFIED REGISTERED

## 2020-10-01 PROCEDURE — 2709999900 HC NON-CHARGEABLE SUPPLY: Performed by: SURGERY

## 2020-10-01 PROCEDURE — 88342 IMHCHEM/IMCYTCHM 1ST ANTB: CPT

## 2020-10-01 PROCEDURE — 7100000011 HC PHASE II RECOVERY - ADDTL 15 MIN: Performed by: SURGERY

## 2020-10-01 PROCEDURE — 88312 SPECIAL STAINS GROUP 1: CPT

## 2020-10-01 PROCEDURE — 3700000001 HC ADD 15 MINUTES (ANESTHESIA): Performed by: SURGERY

## 2020-10-01 PROCEDURE — 88305 TISSUE EXAM BY PATHOLOGIST: CPT

## 2020-10-01 PROCEDURE — 7100000010 HC PHASE II RECOVERY - FIRST 15 MIN: Performed by: SURGERY

## 2020-10-01 PROCEDURE — 6360000002 HC RX W HCPCS: Performed by: NURSE ANESTHETIST, CERTIFIED REGISTERED

## 2020-10-01 PROCEDURE — 3700000000 HC ANESTHESIA ATTENDED CARE: Performed by: SURGERY

## 2020-10-01 PROCEDURE — 3609012400 HC EGD TRANSORAL BIOPSY SINGLE/MULTIPLE: Performed by: SURGERY

## 2020-10-01 PROCEDURE — 43239 EGD BIOPSY SINGLE/MULTIPLE: CPT | Performed by: SURGERY

## 2020-10-01 PROCEDURE — 2580000003 HC RX 258: Performed by: SURGERY

## 2020-10-01 RX ORDER — CYCLOBENZAPRINE HCL 10 MG
10 TABLET ORAL 3 TIMES DAILY PRN
COMMUNITY

## 2020-10-01 RX ORDER — SODIUM CHLORIDE, SODIUM LACTATE, POTASSIUM CHLORIDE, CALCIUM CHLORIDE 600; 310; 30; 20 MG/100ML; MG/100ML; MG/100ML; MG/100ML
INJECTION, SOLUTION INTRAVENOUS CONTINUOUS
Status: DISCONTINUED | OUTPATIENT
Start: 2020-10-01 | End: 2020-10-01 | Stop reason: HOSPADM

## 2020-10-01 RX ORDER — BISACODYL 5 MG
TABLET, DELAYED RELEASE (ENTERIC COATED) ORAL
Qty: 4 TABLET | Refills: 0 | Status: CANCELLED | OUTPATIENT
Start: 2020-10-01

## 2020-10-01 RX ORDER — PROPOFOL 10 MG/ML
INJECTION, EMULSION INTRAVENOUS PRN
Status: DISCONTINUED | OUTPATIENT
Start: 2020-10-01 | End: 2020-10-01 | Stop reason: SDUPTHER

## 2020-10-01 RX ORDER — POLYETHYLENE GLYCOL 3350 17 G/17G
POWDER, FOR SOLUTION ORAL
Qty: 238 G | Refills: 0 | Status: CANCELLED | OUTPATIENT
Start: 2020-10-01

## 2020-10-01 RX ADMIN — PROPOFOL 40 MG: 10 INJECTION, EMULSION INTRAVENOUS at 08:39

## 2020-10-01 RX ADMIN — SODIUM CHLORIDE, POTASSIUM CHLORIDE, SODIUM LACTATE AND CALCIUM CHLORIDE: 600; 310; 30; 20 INJECTION, SOLUTION INTRAVENOUS at 07:54

## 2020-10-01 RX ADMIN — LIDOCAINE HYDROCHLORIDE 20 MG: 10 INJECTION, SOLUTION INFILTRATION; PERINEURAL at 08:33

## 2020-10-01 RX ADMIN — PROPOFOL 80 MG: 10 INJECTION, EMULSION INTRAVENOUS at 08:33

## 2020-10-01 RX ADMIN — PROPOFOL 50 MG: 10 INJECTION, EMULSION INTRAVENOUS at 08:36

## 2020-10-01 ASSESSMENT — PAIN SCALES - GENERAL
PAINLEVEL_OUTOF10: 0
PAINLEVEL_OUTOF10: 0

## 2020-10-01 NOTE — ANESTHESIA PRE PROCEDURE
Department of Anesthesiology  Preprocedure Note       Name:  Francois Mello   Age:  63 y.o.  :  1965                                          MRN:  [id number]         Date:  10/1/2020      Surgeon: Charlie Hutson):  Heide Nicole MD    Procedure: Procedure(s):  EGD BIOPSY  COLONOSCOPY DIAGNOSTIC    Medications prior to admission:   Prior to Admission medications    Medication Sig Start Date End Date Taking? Authorizing Provider   cyclobenzaprine (FLEXERIL) 10 MG tablet Take 10 mg by mouth 3 times daily as needed for Muscle spasms   Yes Historical Provider, MD   ondansetron (ZOFRAN ODT) 4 MG disintegrating tablet Take 1 tablet by mouth every 8 hours as needed for Nausea or Vomiting 20   Jennifer Jaquez MD   propranolol (INDERAL) 20 MG tablet take 1 tablet by mouth twice a day 10/15/13   Fang Carlton MD   buPROPion (WELLBUTRIN) 75 MG tablet Take 1 tablet by mouth 2 times daily. 13   Fang Carlton MD   metformin (GLUCOPHAGE) 1000 MG tablet Take 1 tablet by mouth 2 times daily (with meals). 13   Fang Carlton MD   DULoxetine (CYMBALTA) 60 MG capsule Take 1 capsule by mouth daily. 13   Fang Carlton MD   glucose blood VI test strips (JALIL CONTOUR NEXT TEST) strip 1 each by Does not apply route daily. As needed. 13   Fang Carlton MD   aspirin 81 MG EC tablet Take 81 mg by mouth daily.     Historical Provider, MD       Current medications:    Current Facility-Administered Medications   Medication Dose Route Frequency Provider Last Rate Last Dose    lactated ringers infusion   Intravenous Continuous Heide Nicole MD 80 mL/hr at 10/01/20 0754         Allergies:  No Known Allergies    Problem List:    Patient Active Problem List   Diagnosis Code    HTN (hypertension) I10    Headache, migraine G43.909    Type II or unspecified type diabetes mellitus without mention of complication, uncontrolled [id number]    Hypogonadism     Hyperlipidemia E78.5    Accidental drug overdose T50.901A    Acute bronchitis J20.9    Acute renal failure (ARF) (HCC) N17.9    Essential hypertension I10    Type 2 diabetes mellitus (HCC) E11.9    Migraine headache G43.909       Past Medical History:        Diagnosis Date    Asthma     Cancer ([facility]     melanoma on back    Chronic back pain     Depression     Elevated liver enzymes     Erectile dysfunction     Headache     Hyperlipidemia     Hypertension     Hypogonadism     Melanoma ([facility]     Memory loss     S/P Botox injection     neck x12, face x6    Type II or unspecified type diabetes mellitus without mention of complication, not stated as uncontrolled        Past Surgical History:        Procedure Laterality Date    SKIN CANCER EXCISION      back    TONSILLECTOMY         Social History:    Social History     Tobacco Use    Smoking status: Never Smoker    Smokeless tobacco: Never Used   Substance Use Topics    Alcohol use: No     Comment: quit 5 years ago                                Counseling given: Not Answered      Vital Signs (Current):   Vitals:    10/01/20 0737   BP: (!) 145/94   Pulse: 94   Resp: 20   Temp: 97.5 °F (36.4 °C)   TempSrc: Oral   SpO2: 99%   Weight: 180 lb (81.6 kg)   Height: 5' 8\" (1.727 m)                                              BP Readings from Last 3 Encounters:   10/01/20 (!) 145/94   08/28/20 (!) 150/97   08/01/20 (!) 137/92       NPO Status: Time of last liquid consumption: 0615                        Time of last solid consumption: 1300                        Date of last liquid consumption: 10/01/20                        Date of last solid food consumption: 09/30/20    BMI:   Wt Readings from Last 3 Encounters:   10/01/20 180 lb (81.6 kg)   08/28/20 180 lb (81.6 kg)   08/01/20 180 lb (81.6 kg)     Body mass index is 27.37 kg/m².     CBC:   Lab Results   Component Value Date    WBC 7.8 09/15/2019    RBC 4.36 09/15/2019    HGB 12.8 09/15/2019    HCT 38.5 09/15/2019    MCV 88.3 09/15/2019    RDW 13.7 09/15/2019  09/15/2019       CMP:   Lab Results   Component Value Date     09/15/2019    K 4.2 09/15/2019    K 4.0 05/19/2019     09/15/2019    CO2 22 09/15/2019    BUN 5 09/15/2019    CREATININE 1.1 09/15/2019    GFRAA >59 09/15/2019    AGRATIO 1.3 09/15/2019    LABGLOM >59 09/15/2019    GLUCOSE 183 09/15/2019    PROT 8.3 09/15/2019    CALCIUM 9.8 09/15/2019    BILITOT 0.5 09/15/2019    ALKPHOS 44 09/15/2019    AST 27 09/15/2019    ALT 24 09/15/2019       POC Tests:   Recent Labs     10/01/20  0744   POCGLU 167*       Coags: No results found for: PROTIME, INR, APTT    HCG (If Applicable): No results found for: PREGTESTUR, PREGSERUM, HCG, HCGQUANT     ABGs: No results found for: PHART, PO2ART, GML6VQI, GCL0BMD, BEART, Y2JXGLCA     Type & Screen (If Applicable):  No results found for: LABABO, LABRH    Drug/Infectious Status (If Applicable):  No results found for: HIV, HEPCAB    COVID-19 Screening (If Applicable):   Lab Results   Component Value Date    COVID19 Not Detected 09/29/2020         Anesthesia Evaluation  Patient summary reviewed and Nursing notes reviewed  Airway: Mallampati: II        Dental:          Pulmonary:normal exam  breath sounds clear to auscultation  (+) asthma: allergic asthma,                            Cardiovascular:  Exercise tolerance: good (>4 METS),   (+) hypertension: moderate, hyperlipidemia      ECG reviewed  Rhythm: regular  Rate: normal                    Neuro/Psych:   (+) headaches: migraine headaches, depression/anxiety             GI/Hepatic/Renal:   (+) GERD: poorly controlled,           Endo/Other:    (+) DiabetesType II DM, poorly controlled, , .          Pt had PAT visit. ROS comment: CBP Abdominal:           Vascular: negative vascular ROS. Anesthesia Plan      MAC     ASA 3       Induction: intravenous. Anesthetic plan and risks discussed with patient. Use of blood products discussed with patient whom. Chidi Mehta, AUGIE - BALDEMAR   10/1/2020

## 2020-10-01 NOTE — PROGRESS NOTES
Pt arrives ambulatory. No complaints noted. Verifies he is here for colonoscopy and EGD with Dr. Anitra Horta; however, he states that his prep did not work and that he had no BM. Will consult with Dr. Anitra Horta for evaluation of canceling the colonoscopy. No questions noted. States aunt will drive him home post-procedure. Side rails up x 2. Pt denies chest pain or SOA.  +BS. Pt states that he is here for evaluation of anemia. Denies any blood with BM.

## 2020-10-01 NOTE — OP NOTE
PATIENT:    Sujata Houston    DATE OF SURGERY:  10/01/20    PHYSICIAN:    Floyd Berger MD, FACS    REFERRING PHYSICIAN:  AUGIE Perez CNP    YOB: 1965    PREOPERATIVE DIAGNOSIS: Anemia    POSTOPERATIVE DIAGNOSIS: Severe gastritis, esophagitis and hiatal hernia      Estimated blood loss: None    PROCEDURE:  Esophagogastroduodenoscopy with biopsy. HISTORY:  The patient was sent to me as a consultation via AUGIE Perez CNP for evaluation and treatment of the above-mentioned symptomatology. The patient is here now today for elective upper endoscopy with biopsy. I did meet with the patient preoperatively who understands the full risks and benefits of the above-mentioned procedure. We will proceed with this today on an elective basis. ANESTHESIA:  The patient was monitored both preoperatively and postoperatively in the normal fashion from a cardiovascular standpoint. Oxygen was delivered at 2 liters per nasal cannula, and oxygen saturations were monitored during the procedure. OPERATIVE PROCEDURE:  The patient was taken to the endoscopy unit and placed in the supine position and given anesthesia as mentioned above. The patient was then placed in the left lateral decubitus position and the scope was introduced into the patients mouth and into the esophagus. The findings were as follows:  1. Esophagus - the esophagus was entered without difficulty. Good visualization was obtained from the oropharyngeal region down to the gastroesophageal junction. There was no evidence of esophageal masses, stricture, or extrinsic compression. Significant esophagitis, biopsy x2 obtained    2. Stomach - the stomach was entered without difficulty. There was excellent visualization obtained of all mucosal surfaces. A retroflexed view was obtained and this showed no evidence of acute abnormalities.   There was no evidence of intragastric masses, neoplasms, or extrinsic compression. Severe gastritis, biopsy x2 obtained    3. Duodenum - the duodenum was normal down through the second portion. Biopsy x2 obtained since patient has anemia. Minimal duodenitis    The patient was stable at this point in time and subsequently transferred back to the recovery room in stable condition. PLAN: Continue PPI medication, await pathology report.   Patient will need repeat colonoscopy due to poor prep    Electronically signed by Renetta Roman MD, FACS on 10/1/2020 at 8:44 AM

## 2020-10-01 NOTE — PROGRESS NOTES
Report received from [facility], [facility]. Pt to room via stretcher, NAD, No c/o noted. HOB elevated 30 degrees, Left side lying position. BS active in all 4 quads. Lung sounds CTA and no distress noted. Rails up x2, stretcher locked. Assessment completed. Awakens easily. Abd soft /NT/ND: +BS, Denies N/V/abd pain.

## 2020-10-01 NOTE — PROGRESS NOTES
Pt tolerating PO well without N/V. IV DC'ed with tip intact and pressure applied. No complications at site. DC instructions with follow up given. Instructed that Dr Lexa Araiza office will call him with follow up appt for possible another colonoscopy. Pt left without questions. Condition stable. Belongings with pt. Pt left endoscopy with his aunt. Pt ambulatory. DC instructions in hand.

## 2020-10-05 RX ORDER — BISACODYL 5 MG
TABLET, DELAYED RELEASE (ENTERIC COATED) ORAL
Qty: 4 TABLET | Refills: 0 | Status: SHIPPED | OUTPATIENT
Start: 2020-10-05

## 2020-10-05 RX ORDER — POLYETHYLENE GLYCOL 3350 17 G/17G
POWDER, FOR SOLUTION ORAL
Qty: 238 G | Refills: 0 | Status: SHIPPED | OUTPATIENT
Start: 2020-10-05

## 2020-10-07 ENCOUNTER — TELEPHONE (OUTPATIENT)
Dept: SURGERY | Facility: CLINIC | Age: 55
End: 2020-10-07

## 2020-10-20 ENCOUNTER — HOSPITAL ENCOUNTER (EMERGENCY)
Facility: HOSPITAL | Age: 55
Discharge: HOME | End: 2020-10-20
Attending: FAMILY MEDICINE
Payer: MEDICARE

## 2020-10-20 VITALS
RESPIRATION RATE: 18 BRPM | SYSTOLIC BLOOD PRESSURE: 153 MMHG | OXYGEN SATURATION: 96 % | TEMPERATURE: 98.8 F | HEART RATE: 106 BPM | HEIGHT: 68 IN | BODY MASS INDEX: 26.67 KG/M2 | DIASTOLIC BLOOD PRESSURE: 87 MMHG | WEIGHT: 176 LBS

## 2020-10-20 PROCEDURE — 99283 EMERGENCY DEPT VISIT LOW MDM: CPT

## 2020-10-20 PROCEDURE — 6370000000 HC RX 637 (ALT 250 FOR IP): Performed by: FAMILY MEDICINE

## 2020-10-20 RX ORDER — ALPRAZOLAM 0.25 MG/1
1 TABLET ORAL ONCE
Status: COMPLETED | OUTPATIENT
Start: 2020-10-20 | End: 2020-10-20

## 2020-10-20 RX ORDER — SUMATRIPTAN 50 MG/1
50 TABLET, FILM COATED ORAL
COMMUNITY

## 2020-10-20 RX ORDER — OXYCODONE HYDROCHLORIDE AND ACETAMINOPHEN 5; 325 MG/1; MG/1
2 TABLET ORAL ONCE
Status: COMPLETED | OUTPATIENT
Start: 2020-10-20 | End: 2020-10-20

## 2020-10-20 RX ADMIN — ALPRAZOLAM 1 MG: 0.25 TABLET ORAL at 16:58

## 2020-10-20 RX ADMIN — OXYCODONE HYDROCHLORIDE AND ACETAMINOPHEN 2 TABLET: 5; 325 TABLET ORAL at 16:58

## 2020-10-20 ASSESSMENT — PAIN DESCRIPTION - LOCATION: LOCATION: HEAD

## 2020-10-20 ASSESSMENT — PAIN SCALES - GENERAL: PAINLEVEL_OUTOF10: 8

## 2020-10-20 ASSESSMENT — PAIN DESCRIPTION - PAIN TYPE: TYPE: ACUTE PAIN;CHRONIC PAIN

## 2020-10-20 ASSESSMENT — ENCOUNTER SYMPTOMS
PHOTOPHOBIA: 0
VOMITING: 0
NAUSEA: 0

## 2020-10-20 ASSESSMENT — PAIN DESCRIPTION - ORIENTATION: ORIENTATION: ANTERIOR;LEFT

## 2020-10-20 ASSESSMENT — PAIN DESCRIPTION - DESCRIPTORS: DESCRIPTORS: HEADACHE

## 2020-10-20 ASSESSMENT — PAIN DESCRIPTION - FREQUENCY: FREQUENCY: CONTINUOUS

## 2020-10-20 NOTE — ED PROVIDER NOTES
[address] [place] [address] Court  eMERGENCY dEPARTMENT eNCOUnter      Pt Name: Eduarda Amin  MRN: [id number]  [place] 1965  Date of evaluation: 10/20/2020  Provider: Rachel Ulloa MD    [address] [place] [address]       Chief Complaint   Patient presents with    Migraine         HISTORY OF PRESENT ILLNESS   (Location/Symptom, Timing/Onset, Context/Setting, Quality, Duration, Modifying Factors, Severity)  Note limiting factors. Eduarda Amin is a 63 y.o. male who presents to the emergency department with a 4-day history of a pounding headache which is bilateral and similar to the many headaches he has had in the past.  He denies vision problems. He denies nausea and vomiting. There has been no recent head trauma. Nursing Notes were reviewed. REVIEW OF SYSTEMS    (2-9 systems for level 4, 10 or more forlevel 5)     Review of Systems   Constitutional: Negative for fever. Eyes: Negative for photophobia and visual disturbance. Gastrointestinal: Negative for nausea and vomiting. Neurological: Positive for headaches. Negative for dizziness, speech difficulty, weakness and numbness. Except as noted above the remainder of the review of systems was reviewed and negative.        PAST MEDICAL HISTORY     Past Medical History:   Diagnosis Date    Asthma     Cancer ([address]     melanoma on back    Chronic back pain     Depression     Elevated liver enzymes     Erectile dysfunction     Headache     Hyperlipidemia     Hypertension     Hypogonadism     [facility]an[facility] [address]     Memory loss     S/P Botox injection     neck x12, face x6    Type II or unspecified type diabetes mellitus without mention of complication, not stated as uncontrolled          SURGICAL HISTORY       Past Surgical History:   Procedure Laterality Date    SKIN CANCER EXCISION      back    TONSILLECTOMY      UPPER GASTROINTESTINAL ENDOSCOPY N/A 10/1/2020    EGD BIOPSY performed by Susie Almaguer MD at  [facility] CURRENT MEDICATIONS       Previous Medications    ASPIRIN 81 MG EC TABLET    Take 81 mg by mouth daily. BUPROPION (WELLBUTRIN) 75 MG TABLET    Take 1 tablet by mouth 2 times daily. CYCLOBENZAPRINE (FLEXERIL) 10 MG TABLET    Take 10 mg by mouth 3 times daily as needed for Muscle spasms    DULOXETINE (CYMBALTA) 60 MG CAPSULE    Take 1 capsule by mouth daily. GLUCOSE BLOOD VI TEST STRIPS (JALIL CONTOUR NEXT TEST) STRIP    1 each by Does not apply route daily. As needed. METFORMIN (GLUCOPHAGE) 1000 MG TABLET    Take 1 tablet by mouth 2 times daily (with meals). ONDANSETRON (ZOFRAN ODT) 4 MG DISINTEGRATING TABLET    Take 1 tablet by mouth every 8 hours as needed for Nausea or Vomiting    PROPRANOLOL (INDERAL) 20 MG TABLET    take 1 tablet by mouth twice a day    SUMATRIPTAN (IMITREX) 50 MG TABLET    Take 50 mg by mouth once as needed for Migraine       ALLERGIES     Patient has no known allergies.     FAMILY HISTORY       Family History   Problem Relation Age of Onset    Cancer Father     High Blood Pressure Father           SOCIAL HISTORY       Social History     Socioeconomic History    Marital status: Single     Spouse name: None    Number of children: None    Years of education: None    Highest education level: None   Occupational History    None   Social Needs    Financial resource strain: None    Food insecurity     Worry: None     Inability: None    Transportation needs     Medical: None     Non-medical: None   Tobacco Use    Smoking status: Never Smoker    Smokeless tobacco: Never Used   Substance and Sexual Activity    Alcohol use: No     Comment: quit 5 years ago    Drug use: No    Sexual activity: None   Lifestyle    Physical activity     Days per week: None     Minutes per session: None    Stress: None   Relationships    Social connections     Talks on phone: None     Gets together: None     Attends Worship service: None     Active member of club or organization: None Attends meetings of clubs or organizations: None     Relationship status: None    Intimate partner violence     Fear of current or ex partner: None     Emotionally abused: None     Physically abused: None     Forced sexual activity: None   Other Topics Concern    None   Social History Narrative    None       SCREENINGS             PHYSICAL EXAM    (up to 7 for level 4, 8 or more for level 5)     ED Triage Vitals [10/20/20 1536]   BP Temp Temp Source Pulse Resp SpO2 Height Weight   (!) 141/94 98.8 °F (37.1 °C) Oral 106 18 98 % 5' 8\" (1.727 m) 176 lb (79.8 kg)       Physical Exam  Vitals signs and nursing note reviewed. Constitutional:       Appearance: He is not ill-appearing or toxic-appearing. HENT:      Head: Atraumatic. Mouth/Throat:      Mouth: Mucous membranes are moist.   Eyes:      Extraocular Movements: Extraocular movements intact. Conjunctiva/sclera: Conjunctivae normal.      Pupils: Pupils are equal, round, and reactive to light. Funduscopic exam:     Right eye: No papilledema. Left eye: No papilledema. Neck:      Musculoskeletal: Neck supple. No muscular tenderness. Cardiovascular:      Rate and Rhythm: Normal rate. Pulmonary:      Effort: Pulmonary effort is normal.   Neurological:      General: No focal deficit present. Mental Status: He is alert and oriented to person, place, and time. Mental status is at baseline.          DIAGNOSTIC RESULTS     EKG: All EKG's are interpreted by the Emergency Department Physician who either signs or Co-signs this chart in the absence of a cardiologist.        RADIOLOGY:   Non-plain film images such as CT, Ultrasound and MRI are read by the radiologist. Plainradiographic images are visualized and preliminarily interpreted by the emergency physician with the below findings:        Interpretation per the Radiologist below, if available at the time of this note:    No orders to display         ED BEDSIDE ULTRASOUND:   Performed by ED Physician - none    LABS:  Labs Reviewed - No data to display    All other labs were within normal range or not returned as of this dictation. EMERGENCY DEPARTMENT COURSE and DIFFERENTIALDIAGNOSIS/MDM:   Vitals:    Vitals:    10/20/20 1536   BP: (!) 141/94   Pulse: 106   Resp: 18   Temp: 98.8 °F (37.1 °C)   TempSrc: Oral   SpO2: 98%   Weight: 176 lb (79.8 kg)   Height: 5' 8\" (1.727 m)           CRITICALCARE TIME   Total Critical Care time was 0 minutes, excludingseparately reportable procedures. There was a high probabilityof clinically significant/life threatening deterioration in the patient's condition which required my urgent intervention. CONSULTS:  None    PROCEDURES:  None    FINAL IMPRESSION      1.  Nonintractable headache, unspecified chronicity pattern, unspecified headache type        DISPOSITION/PLAN   DISPOSITION Decision To Discharge 10/20/2020 04:20:23 PM      PATIENT REFERRED TO:  AUGIE Dumas - CNP  [address]  [phone]      As needed      DISCHARGE MEDICATIONS:  New Prescriptions    No medications on file       (Please note that portions ofthis note were completed with a voice recognition program.  Efforts were made to edit the dictations but occasionally words are mis-transcribed.)    aDmian Mendoza MD(electronically signed)  Attending Emergency Physician          Damian Mendoza MD  10/20/20 8438

## 2020-10-21 ENCOUNTER — TELEPHONE (OUTPATIENT)
Dept: SURGERY | Facility: CLINIC | Age: 55
End: 2020-10-21

## 2020-10-21 RX ORDER — BISACODYL 5 MG
TABLET, DELAYED RELEASE (ENTERIC COATED) ORAL
Qty: 4 TABLET | Refills: 0 | Status: SHIPPED | OUTPATIENT
Start: 2020-10-21

## 2020-10-21 RX ORDER — POLYETHYLENE GLYCOL 3350 17 G/17G
POWDER, FOR SOLUTION ORAL
Qty: 238 G | Refills: 0 | Status: SHIPPED | OUTPATIENT
Start: 2020-10-21

## 2020-10-22 ENCOUNTER — PREP FOR SURGERY (OUTPATIENT)
Dept: OTHER | Facility: HOSPITAL | Age: 55
End: 2020-10-22

## 2020-10-22 DIAGNOSIS — Z12.11 COLON CANCER SCREENING: Primary | ICD-10-CM

## 2020-10-27 ENCOUNTER — HOSPITAL ENCOUNTER (OUTPATIENT)
Facility: HOSPITAL | Age: 55
Discharge: HOME | End: 2020-10-27
Payer: MEDICARE

## 2020-10-27 LAB — SARS-COV-2, NAAT: NOT DETECTED

## 2020-10-27 PROCEDURE — U0002 COVID-19 LAB TEST NON-CDC: HCPCS

## 2020-10-29 ENCOUNTER — HOSPITAL ENCOUNTER (OUTPATIENT)
Facility: HOSPITAL | Age: 55
Setting detail: OUTPATIENT SURGERY
Discharge: HOME | End: 2020-10-29
Attending: SURGERY | Admitting: SURGERY
Payer: MEDICARE

## 2020-10-29 ENCOUNTER — OUTSIDE FACILITY SERVICE (OUTPATIENT)
Dept: SURGERY | Facility: CLINIC | Age: 55
End: 2020-10-29

## 2020-10-29 ENCOUNTER — ANESTHESIA EVENT (OUTPATIENT)
Dept: ENDOSCOPY | Facility: HOSPITAL | Age: 55
End: 2020-10-29
Payer: MEDICARE

## 2020-10-29 ENCOUNTER — ANESTHESIA (OUTPATIENT)
Dept: ENDOSCOPY | Facility: HOSPITAL | Age: 55
End: 2020-10-29
Payer: MEDICARE

## 2020-10-29 VITALS
HEART RATE: 81 BPM | RESPIRATION RATE: 18 BRPM | WEIGHT: 178 LBS | BODY MASS INDEX: 26.98 KG/M2 | HEIGHT: 68 IN | DIASTOLIC BLOOD PRESSURE: 97 MMHG | TEMPERATURE: 97.8 F | SYSTOLIC BLOOD PRESSURE: 130 MMHG | OXYGEN SATURATION: 96 %

## 2020-10-29 VITALS
DIASTOLIC BLOOD PRESSURE: 72 MMHG | OXYGEN SATURATION: 89 % | RESPIRATION RATE: 10 BRPM | SYSTOLIC BLOOD PRESSURE: 105 MMHG

## 2020-10-29 LAB
GLUCOSE BLD-MCNC: 164 MG/DL (ref 74–106)
PERFORMED ON: ABNORMAL

## 2020-10-29 PROCEDURE — 7100000010 HC PHASE II RECOVERY - FIRST 15 MIN: Performed by: SURGERY

## 2020-10-29 PROCEDURE — G0121 COLON CA SCRN NOT HI RSK IND: HCPCS | Performed by: SURGERY

## 2020-10-29 PROCEDURE — 3700000001 HC ADD 15 MINUTES (ANESTHESIA): Performed by: SURGERY

## 2020-10-29 PROCEDURE — 3609027000 HC COLONOSCOPY: Performed by: SURGERY

## 2020-10-29 PROCEDURE — 7100000011 HC PHASE II RECOVERY - ADDTL 15 MIN: Performed by: SURGERY

## 2020-10-29 PROCEDURE — 2580000003 HC RX 258: Performed by: SURGERY

## 2020-10-29 PROCEDURE — 3700000000 HC ANESTHESIA ATTENDED CARE: Performed by: SURGERY

## 2020-10-29 PROCEDURE — 6360000002 HC RX W HCPCS: Performed by: NURSE ANESTHETIST, CERTIFIED REGISTERED

## 2020-10-29 PROCEDURE — 2500000003 HC RX 250 WO HCPCS: Performed by: NURSE ANESTHETIST, CERTIFIED REGISTERED

## 2020-10-29 RX ORDER — PROPOFOL 10 MG/ML
INJECTION, EMULSION INTRAVENOUS PRN
Status: DISCONTINUED | OUTPATIENT
Start: 2020-10-29 | End: 2020-10-29 | Stop reason: SDUPTHER

## 2020-10-29 RX ORDER — SODIUM CHLORIDE, SODIUM LACTATE, POTASSIUM CHLORIDE, CALCIUM CHLORIDE 600; 310; 30; 20 MG/100ML; MG/100ML; MG/100ML; MG/100ML
INJECTION, SOLUTION INTRAVENOUS CONTINUOUS
Status: DISCONTINUED | OUTPATIENT
Start: 2020-10-29 | End: 2020-10-29 | Stop reason: HOSPADM

## 2020-10-29 RX ADMIN — PROPOFOL 80 MG: 10 INJECTION, EMULSION INTRAVENOUS at 09:49

## 2020-10-29 RX ADMIN — PROPOFOL 80 MG: 10 INJECTION, EMULSION INTRAVENOUS at 09:51

## 2020-10-29 RX ADMIN — SODIUM CHLORIDE, POTASSIUM CHLORIDE, SODIUM LACTATE AND CALCIUM CHLORIDE: 600; 310; 30; 20 INJECTION, SOLUTION INTRAVENOUS at 08:51

## 2020-10-29 RX ADMIN — LIDOCAINE HYDROCHLORIDE 20 MG: 10 INJECTION, SOLUTION INFILTRATION; PERINEURAL at 09:49

## 2020-10-29 RX ADMIN — PROPOFOL 40 MG: 10 INJECTION, EMULSION INTRAVENOUS at 09:55

## 2020-10-29 RX ADMIN — PROPOFOL 30 MG: 10 INJECTION, EMULSION INTRAVENOUS at 09:53

## 2020-10-29 ASSESSMENT — PAIN SCALES - GENERAL
PAINLEVEL_OUTOF10: 3
PAINLEVEL_OUTOF10: 5
PAINLEVEL_OUTOF10: 6

## 2020-10-29 ASSESSMENT — PAIN DESCRIPTION - PAIN TYPE: TYPE: CHRONIC PAIN

## 2020-10-29 ASSESSMENT — PAIN DESCRIPTION - LOCATION: LOCATION: BACK

## 2020-10-29 NOTE — PROGRESS NOTES
Pt to room via stretcher, NAD, No c/o noted. HOB elevated 30 degrees, Left side lying position. BS active in all 4 quads. Lung sounds CTA and no distress noted. Rails up x2, stretcher locked. Assessment completed. Awakens easily. Abd soft /NT/ND:+flatus, +BS, Denies N/V/abd pain. Pt complaining of chronic back pain. Report received from [facility].

## 2020-10-29 NOTE — PROGRESS NOTES
Pt arrived ambulatory to endoscopy. Christine Tarango, not here, but will call to drive home. Consent verified without questions. LR at 80cc/hr per gravity. Prep completed and effective per pt. DC instructions completed with pt. No questions at this time. Denies need to void at this time. Will continue to monitor closely.

## 2020-10-29 NOTE — ANESTHESIA POSTPROCEDURE EVALUATION
Department of Anesthesiology  Postprocedure Note    Patient: Patricia Avila  MRN: [id number]  YOB: 1965  Date of evaluation: 10/29/2020  Time:  11:15 AM     Procedure Summary     Date:  10/29/20 Room / Location:  [address] [place] [address] 01 / 1201 [facility]    Anesthesia Start:  7551 Anesthesia Stop:  1003    Procedure:  COLORECTAL CANCER SCREENING, NOT HIGH RISK (N/A ) Diagnosis:  (Z12.11)    Surgeon:  Jesi Zee MD Responsible Provider:  AUGIE Browning CRNA    Anesthesia Type:  MAC ASA Status:  3          Anesthesia Type: MAC    Carol Phase I: Carol Score: 10    Carol Phase II: Carol Score: 10    Last vitals: Reviewed and per EMR flowsheets.        Anesthesia Post Evaluation    Patient location during evaluation: bedside  Patient participation: complete - patient participated  Level of consciousness: awake and alert  Airway patency: patent  Nausea & Vomiting: no nausea and no vomiting  Complications: no  Cardiovascular status: blood pressure returned to baseline  Respiratory status: acceptable  Hydration status: stable

## 2020-10-29 NOTE — OP NOTE
examination was performed and revealed good sphincter tone and no obvious masses. The patient was stable at this point in time and subsequently transferred back to the recovery room in stable condition. QUALITY OF PREP: Very poor prep    PLAN: Patient needs repeat colonoscopy if he is compliant with prep.     Electronically signed by Annika Cruz MD, FACS on 10/29/2020 at 10:05 AM

## 2020-10-29 NOTE — H&P
HISTORY & PHYSICAL      Patient Name: Olivia Em      Medical Record Number: [id number]       Date of Service: 10/29/2020      I have reviewed the consult or history and physical from 10/22/2020. There have been no significant changes in the patient's history or exam.  There have been no changes in the patient's medications.     Patient ASA 3 for anesthesia    Past Surgical History:   Procedure Laterality Date    SKIN CANCER EXCISION      back    TONSILLECTOMY      UPPER GASTROINTESTINAL ENDOSCOPY N/A 10/1/2020    EGD BIOPSY performed by Francisco Huang MD at [address] [place] [address] ENDOSCOPY        Past Medical History:   Diagnosis Date    Asthma     Cancer ([facility] [address]     melanoma on back    Chronic back pain     Depression     Elevated liver enzymes     Erectile dysfunction     Headache     Hyperlipidemia     Hypertension     Hypogonadism     [facility]     Memory loss     S/P Botox injection     neck x12, face x6    Type II or unspecified type diabetes mellitus without mention of complication, not stated as uncontrolled         Current Facility-Administered Medications   Medication Dose Route Frequency Provider Last Rate Last Dose    lactated ringers infusion   Intravenous Continuous Francisco Huang MD 80 mL/hr at 10/29/20 7146          Electronically signed by Francisco Huang MD on 10/29/2020 at 10:04 AM   I have reviewed the H&P completely no changes, this will serve as the H&P for 10/29/20

## 2020-10-29 NOTE — ANESTHESIA PRE PROCEDURE
Department of Anesthesiology  Preprocedure Note       Name:  Alan Rosales   Age:  63 y.o.  :  1965                                          MRN:  [id number]         Date:  10/29/2020      Surgeon: Mary Kramer):  Nolberto David MD    Procedure: Procedure(s):  COLORECTAL CANCER SCREENING, NOT HIGH RISK    Medications prior to admission:   Prior to Admission medications    Medication Sig Start Date End Date Taking? Authorizing Provider   SUMAtriptan (IMITREX) 50 MG tablet Take 50 mg by mouth once as needed for Migraine   Yes Historical Provider, MD   cyclobenzaprine (FLEXERIL) 10 MG tablet Take 10 mg by mouth 3 times daily as needed for Muscle spasms   Yes Historical Provider, MD   propranolol (INDERAL) 20 MG tablet take 1 tablet by mouth twice a day 10/15/13  Yes Mckenzie Nielsen MD   buPROPion (WELLBUTRIN) 75 MG tablet Take 1 tablet by mouth 2 times daily. 13  Yes Mckenzie Nielsen MD   metformin (GLUCOPHAGE) 1000 MG tablet Take 1 tablet by mouth 2 times daily (with meals). 13  Yes Mckenzie Nielsen MD   DULoxetine (CYMBALTA) 60 MG capsule Take 1 capsule by mouth daily. 13  Yes Mckenzie Nielsen MD   glucose blood VI test strips (JALIL CONTOUR NEXT TEST) strip 1 each by Does not apply route daily. As needed. 13  Yes Mckenzie Nielsen MD   aspirin 81 MG EC tablet Take 81 mg by mouth daily.    Yes Historical Provider, MD   ondansetron (ZOFRAN ODT) 4 MG disintegrating tablet Take 1 tablet by mouth every 8 hours as needed for Nausea or Vomiting 20   Wesley Bueno MD       Current medications:    Current Facility-Administered Medications   Medication Dose Route Frequency Provider Last Rate Last Dose    lactated ringers infusion   Intravenous Continuous Nolberto David MD 80 mL/hr at 10/29/20 0851         Allergies:  No Known Allergies    Problem List:    Patient Active Problem List   Diagnosis Code    HTN (hypertension) I10    Headache, migraine G43.909    Type II or unspecified type diabetes mellitus (81.6 kg)     Body mass index is 27.06 kg/m². CBC:   Lab Results   Component Value Date    WBC 7.8 09/15/2019    RBC 4.36 09/15/2019    HGB 12.8 09/15/2019    HCT 38.5 09/15/2019    MCV 88.3 09/15/2019    RDW 13.7 09/15/2019     09/15/2019       CMP:   Lab Results   Component Value Date     09/15/2019    K 4.2 09/15/2019    K 4.0 05/19/2019     09/15/2019    CO2 22 09/15/2019    BUN 5 09/15/2019    CREATININE 1.1 09/15/2019    GFRAA >59 09/15/2019    AGRATIO 1.3 09/15/2019    LABGLOM >59 09/15/2019    GLUCOSE 183 09/15/2019    PROT 8.3 09/15/2019    CALCIUM 9.8 09/15/2019    BILITOT 0.5 09/15/2019    ALKPHOS 44 09/15/2019    AST 27 09/15/2019    ALT 24 09/15/2019       POC Tests:   Recent Labs     10/29/20  0848   POCGLU 164*       Coags: No results found for: PROTIME, INR, APTT    HCG (If Applicable): No results found for: PREGTESTUR, PREGSERUM, HCG, HCGQUANT     ABGs: No results found for: PHART, PO2ART, TTY5ZDB, YVR2HKD, BEART, X8RESTEL     Type & Screen (If Applicable):  No results found for: LABABO, LABRH    Drug/Infectious Status (If Applicable):  No results found for: HIV, HEPCAB    COVID-19 Screening (If Applicable):   Lab Results   Component Value Date    COVID19 Not Detected 10/27/2020         Anesthesia Evaluation  Patient summary reviewed and Nursing notes reviewed  Airway: Mallampati: II        Dental:          Pulmonary:normal exam  breath sounds clear to auscultation  (+) asthma: allergic asthma,                            Cardiovascular:    (+) hypertension: moderate, dysrhythmias:,       ECG reviewed  Rhythm: regular  Rate: normal                 ROS comment: \"irrigular heart beat\" according to pt. Neuro/Psych:   (+) headaches: migraine headaches, depression/anxiety             GI/Hepatic/Renal: Neg GI/Hepatic/Renal ROS            Endo/Other: Negative Endo/Other ROS   (+) DiabetesType II DM, poorly controlled, , .          Pt had PAT visit.        Abdominal: Vascular: negative vascular ROS. Anesthesia Plan      MAC     ASA 3       Induction: intravenous. Anesthetic plan and risks discussed with patient. Use of blood products discussed with patient whom.                    AUGIE Guzmán - BALDEMAR   10/29/2020

## 2020-11-01 ENCOUNTER — RESULTS ENCOUNTER (OUTPATIENT)
Dept: SURGERY | Facility: CLINIC | Age: 55
End: 2020-11-01

## 2020-11-01 DIAGNOSIS — Z01.818 PREOP TESTING: ICD-10-CM

## 2020-11-04 ENCOUNTER — APPOINTMENT (OUTPATIENT)
Dept: CT IMAGING | Facility: HOSPITAL | Age: 55
End: 2020-11-04
Payer: MEDICARE

## 2020-11-04 ENCOUNTER — HOSPITAL ENCOUNTER (EMERGENCY)
Facility: HOSPITAL | Age: 55
Discharge: HOME | End: 2020-11-04
Attending: EMERGENCY MEDICINE
Payer: MEDICARE

## 2020-11-04 ENCOUNTER — APPOINTMENT (OUTPATIENT)
Dept: GENERAL RADIOLOGY | Facility: HOSPITAL | Age: 55
End: 2020-11-04
Payer: MEDICARE

## 2020-11-04 VITALS
HEART RATE: 112 BPM | RESPIRATION RATE: 18 BRPM | WEIGHT: 178 LBS | SYSTOLIC BLOOD PRESSURE: 106 MMHG | BODY MASS INDEX: 26.98 KG/M2 | TEMPERATURE: 100.2 F | HEIGHT: 68 IN | OXYGEN SATURATION: 94 % | DIASTOLIC BLOOD PRESSURE: 79 MMHG

## 2020-11-04 LAB
AMMONIA: 94 MCG/DL (ref 27–102)
AMPHETAMINE SCREEN, URINE: ABNORMAL
ANION GAP SERPL CALCULATED.3IONS-SCNC: 11 MMOL/L (ref 3–16)
APTT: 26 SEC (ref 21.6–33.4)
BARBITURATE SCREEN URINE: ABNORMAL
BASE EXCESS ARTERIAL: -4.7 MMOL/L (ref -3–3)
BASE EXCESS VENOUS: -5 MMOL/L (ref -3–3)
BASOPHILS ABSOLUTE: 0.1 K/UL (ref 0–0.1)
BASOPHILS RELATIVE PERCENT: 0.6 %
BENZODIAZEPINE SCREEN, URINE: ABNORMAL
BILIRUBIN URINE: NEGATIVE
BLOOD, URINE: NEGATIVE
BUN BLDV-MCNC: 7 MG/DL (ref 6–20)
CALCIUM SERPL-MCNC: 8.8 MG/DL (ref 8.5–10.5)
CANNABINOID SCREEN URINE: ABNORMAL
CHLORIDE BLD-SCNC: 95 MMOL/L (ref 98–107)
CHP ED QC CHECK: YES
CLARITY: CLEAR
CO2: 23 MMOL/L (ref 20–30)
COCAINE METABOLITE SCREEN URINE: ABNORMAL
COLOR: YELLOW
CREAT SERPL-MCNC: 1.5 MG/DL (ref 0.4–1.2)
EOSINOPHILS ABSOLUTE: 0.3 K/UL (ref 0–0.4)
EOSINOPHILS RELATIVE PERCENT: 2.6 %
ETHANOL: NORMAL MG/DL (ref 0–0.08)
FIO2: 0.96 %
FIO2: 1 %
GFR AFRICAN AMERICAN: 59
GFR NON-AFRICAN AMERICAN: 48
GLUCOSE BLD-MCNC: 158 MG/DL (ref 74–106)
GLUCOSE BLD-MCNC: 161 MG/DL
GLUCOSE BLD-MCNC: 161 MG/DL (ref 74–106)
GLUCOSE URINE: NEGATIVE MG/DL
HCO3 ARTERIAL: 23 MMOL/L (ref 22–26)
HCO3 VENOUS: 23.2 MMOL/L (ref 23–29)
HCT VFR BLD CALC: 35.3 % (ref 40–54)
HEMOGLOBIN: 11.1 G/DL (ref 13–18)
IMMATURE GRANULOCYTES #: 0.1 K/UL
IMMATURE GRANULOCYTES %: 1.1 % (ref 0–5)
INR BLD: 0.99 (ref 0.87–1.14)
KETONES, URINE: NEGATIVE MG/DL
LACTIC ACID: 1.8 MMOL/L (ref 0.4–2)
LEUKOCYTE ESTERASE, URINE: NEGATIVE
LYMPHOCYTES ABSOLUTE: 1.6 K/UL (ref 1.5–4)
LYMPHOCYTES RELATIVE PERCENT: 12 %
Lab: ABNORMAL
MCH RBC QN AUTO: 28.6 PG (ref 27–32)
MCHC RBC AUTO-ENTMCNC: 31.4 G/DL (ref 31–35)
MCV RBC AUTO: 91 FL (ref 80–100)
METHADONE SCREEN, URINE: ABNORMAL
METHAMPHETAMINE, URINE: ABNORMAL
MICROSCOPIC EXAMINATION: NORMAL
MONOCYTES ABSOLUTE: 0.9 K/UL (ref 0.2–0.8)
MONOCYTES RELATIVE PERCENT: 7.1 %
NEUTROPHILS ABSOLUTE: 10.2 K/UL (ref 2–7.5)
NEUTROPHILS RELATIVE PERCENT: 76.6 %
NITRITE, URINE: NEGATIVE
O2 SAT, ARTERIAL: 95 %
O2 SAT, VEN: 91 %
O2 THERAPY: ABNORMAL
O2 THERAPY: ABNORMAL
OPIATE SCREEN URINE: POSITIVE
PCO2 ARTERIAL: 54.9 MMHG (ref 35–45)
PCO2, VEN: 58.4 MMHG (ref 40–50)
PDW BLD-RTO: 13.1 % (ref 11–16)
PERFORMED ON: ABNORMAL
PH ARTERIAL: 7.24 (ref 7.35–7.45)
PH UA: 6 (ref 5–8)
PH VENOUS: 7.22 (ref 7.35–7.45)
PHENCYCLIDINE SCREEN URINE: ABNORMAL
PLATELET # BLD: 317 K/UL (ref 150–400)
PMV BLD AUTO: 8.8 FL (ref 6–10)
PO2 ARTERIAL: 86.3 MMHG (ref 80–100)
PO2, VEN: 69.5 MMHG (ref 25–40)
POTASSIUM REFLEX MAGNESIUM: 4.2 MMOL/L (ref 3.4–5.1)
PROPOXYPHENE SCREEN, URINE: ABNORMAL
PROTEIN UA: NEGATIVE MG/DL
PROTHROMBIN TIME: 13 SEC (ref 11.7–14.6)
RBC # BLD: 3.88 M/UL (ref 4.5–6)
SARS-COV-2, NAAT: NOT DETECTED
SARS-COV-2, PCR: NORMAL
SODIUM BLD-SCNC: 129 MMOL/L (ref 136–145)
SPECIFIC GRAVITY UA: 1.01 (ref 1–1.03)
TCO2 ARTERIAL: 24.7 MMOL/L (ref 24–30)
TCO2 CALC VENOUS: 25 MMOL/L
TRICYCLIC, URINE: POSITIVE
TROPONIN: <0.3 NG/ML
UR OXYCODONE RAPID SCREEN: ABNORMAL
URINE REFLEX TO CULTURE: NORMAL
URINE TYPE: NORMAL
UROBILINOGEN, URINE: 0.2 E.U./DL
WBC # BLD: 13.3 K/UL (ref 4–11)

## 2020-11-04 PROCEDURE — 82140 ASSAY OF AMMONIA: CPT

## 2020-11-04 PROCEDURE — 36600 WITHDRAWAL OF ARTERIAL BLOOD: CPT

## 2020-11-04 PROCEDURE — 6360000002 HC RX W HCPCS: Performed by: EMERGENCY MEDICINE

## 2020-11-04 PROCEDURE — 70450 CT HEAD/BRAIN W/O DYE: CPT

## 2020-11-04 PROCEDURE — 81003 URINALYSIS AUTO W/O SCOPE: CPT

## 2020-11-04 PROCEDURE — 80048 BASIC METABOLIC PNL TOTAL CA: CPT

## 2020-11-04 PROCEDURE — U0002 COVID-19 LAB TEST NON-CDC: HCPCS

## 2020-11-04 PROCEDURE — 70496 CT ANGIOGRAPHY HEAD: CPT

## 2020-11-04 PROCEDURE — 85025 COMPLETE CBC W/AUTO DIFF WBC: CPT

## 2020-11-04 PROCEDURE — 87040 BLOOD CULTURE FOR BACTERIA: CPT

## 2020-11-04 PROCEDURE — 2580000003 HC RX 258: Performed by: EMERGENCY MEDICINE

## 2020-11-04 PROCEDURE — 82803 BLOOD GASES ANY COMBINATION: CPT

## 2020-11-04 PROCEDURE — 85730 THROMBOPLASTIN TIME PARTIAL: CPT

## 2020-11-04 PROCEDURE — 99285 EMERGENCY DEPT VISIT HI MDM: CPT

## 2020-11-04 PROCEDURE — 70498 CT ANGIOGRAPHY NECK: CPT

## 2020-11-04 PROCEDURE — 6370000000 HC RX 637 (ALT 250 FOR IP): Performed by: EMERGENCY MEDICINE

## 2020-11-04 PROCEDURE — 83605 ASSAY OF LACTIC ACID: CPT

## 2020-11-04 PROCEDURE — 71045 X-RAY EXAM CHEST 1 VIEW: CPT

## 2020-11-04 PROCEDURE — 6360000004 HC RX CONTRAST MEDICATION: Performed by: EMERGENCY MEDICINE

## 2020-11-04 PROCEDURE — G0480 DRUG TEST DEF 1-7 CLASSES: HCPCS

## 2020-11-04 PROCEDURE — 96374 THER/PROPH/DIAG INJ IV PUSH: CPT

## 2020-11-04 PROCEDURE — 80307 DRUG TEST PRSMV CHEM ANLYZR: CPT

## 2020-11-04 PROCEDURE — 85610 PROTHROMBIN TIME: CPT

## 2020-11-04 PROCEDURE — 36415 COLL VENOUS BLD VENIPUNCTURE: CPT

## 2020-11-04 PROCEDURE — 84484 ASSAY OF TROPONIN QUANT: CPT

## 2020-11-04 PROCEDURE — 93005 ELECTROCARDIOGRAM TRACING: CPT

## 2020-11-04 PROCEDURE — U0003 INFECTIOUS AGENT DETECTION BY NUCLEIC ACID (DNA OR RNA); SEVERE ACUTE RESPIRATORY SYNDROME CORONAVIRUS 2 (SARS-COV-2) (CORONAVIRUS DISEASE [COVID-19]), AMPLIFIED PROBE TECHNIQUE, MAKING USE OF HIGH THROUGHPUT TECHNOLOGIES AS DESCRIBED BY CMS-2020-01-R: HCPCS

## 2020-11-04 RX ORDER — ACETAMINOPHEN 650 MG/1
650 SUPPOSITORY RECTAL ONCE
Status: COMPLETED | OUTPATIENT
Start: 2020-11-04 | End: 2020-11-04

## 2020-11-04 RX ORDER — 0.9 % SODIUM CHLORIDE 0.9 %
1000 INTRAVENOUS SOLUTION INTRAVENOUS ONCE
Status: COMPLETED | OUTPATIENT
Start: 2020-11-04 | End: 2020-11-04

## 2020-11-04 RX ORDER — NALOXONE HYDROCHLORIDE 1 MG/ML
1 INJECTION INTRAMUSCULAR; INTRAVENOUS; SUBCUTANEOUS ONCE
Status: COMPLETED | OUTPATIENT
Start: 2020-11-04 | End: 2020-11-04

## 2020-11-04 RX ORDER — NALOXONE HYDROCHLORIDE 0.4 MG/ML
INJECTION, SOLUTION INTRAMUSCULAR; INTRAVENOUS; SUBCUTANEOUS
Status: DISCONTINUED
Start: 2020-11-04 | End: 2020-11-05 | Stop reason: HOSPADM

## 2020-11-04 RX ADMIN — ACETAMINOPHEN 650 MG: 650 SUPPOSITORY RECTAL at 19:05

## 2020-11-04 RX ADMIN — SODIUM CHLORIDE 1000 ML: 9 INJECTION, SOLUTION INTRAVENOUS at 20:00

## 2020-11-04 RX ADMIN — IOPAMIDOL 100 ML: 755 INJECTION, SOLUTION INTRAVENOUS at 18:41

## 2020-11-04 RX ADMIN — SODIUM CHLORIDE 1000 ML: 9 INJECTION, SOLUTION INTRAVENOUS at 18:55

## 2020-11-04 RX ADMIN — NALOXONE HYDROCHLORIDE 1 MG: 1 INJECTION PARENTERAL at 18:56

## 2020-11-04 ASSESSMENT — PAIN DESCRIPTION - LOCATION: LOCATION: HEAD

## 2020-11-04 ASSESSMENT — PAIN DESCRIPTION - DESCRIPTORS: DESCRIPTORS: ACHING

## 2020-11-04 ASSESSMENT — PAIN DESCRIPTION - FREQUENCY: FREQUENCY: CONTINUOUS

## 2020-11-04 ASSESSMENT — PAIN SCALES - GENERAL
PAINLEVEL_OUTOF10: 0
PAINLEVEL_OUTOF10: 6

## 2020-11-04 ASSESSMENT — PAIN DESCRIPTION - PAIN TYPE: TYPE: CHRONIC PAIN

## 2020-11-04 NOTE — ED NOTES
[facility] center let us know that she got in touch with [facility] and that they stated they would call back directly to us with the stroke doctor on the line.       H[facility]CH[facility]  11/04/20 6219

## 2020-11-04 NOTE — ED PROVIDER NOTES
[facility] ENCOUNTER      Pt Name: Alan Rosales  MRN: [id number]  [place] 1965  Date of evaluation: 11/4/2020  Provider: Wesley Bueno MD    CHIEF COMPLAINT       Chief Complaint   Patient presents with    Altered Mental Status         HISTORY OF PRESENT ILLNESS   (Location/Symptom, Timing/Onset, Context/Setting, Quality, Duration, Modifying Factors, Severity)  Note limiting factors. Alan Rosales is a 63 y.o. male who presents to the emergency department with complaint of altered mental status. Mother reports that he was normal and awake and alert around 5 PM.  States she came back in after watching the news around 530 after she heard some strange breathing noises. States his arms were clenched and he was unable to speak to her. EMS was called and arrived on the scene. His Accu-Chek was 160. He appears confused but when yelled out at loudly will track and appear to regain awareness. Can answer simple yes and no questions unable to squeeze hands bilaterally but unable to follow most other commands such as leg movement. Appears to be aphasic. History is limited secondary this. Mother reports history of asthma and frequent migraines. Has been seen here multiple times for migraines. Appropriate PPE was used including an eye shield, gloves, N95 mask, surgical mask during the entire patient encounter and exam.  If necessary (pt being intubated or aerosolizing equipment in use) a gown was also used. Nursing Notes were reviewed.       PAST MEDICAL HISTORY     Past Medical History:   Diagnosis Date    Asthma     Cancer ([address]     melanoma on back    Chronic back pain     Depression     Elevated liver enzymes     Erectile dysfunction     Headache     Hyperlipidemia     Hypertension     Hypogonadism     Melanoma ([facility] [address]     Memory loss     S/P Botox injection     neck x12, face x6    Type II or unspecified type diabetes mellitus without mention of complication, not stated as uncontrolled          SURGICAL HISTORY       Past Surgical History:   Procedure Laterality Date    COLONOSCOPY N/A 10/29/2020    COLORECTAL CANCER SCREENING, NOT HIGH RISK performed by Millie Hernandez MD at [address] [facility]    TONSILLECTOMY      UPPER GASTROINTESTINAL ENDOSCOPY N/A 10/1/2020    EGD BIOPSY performed by Millie Hernandez MD at [address] [place] [address] Drive       Previous Medications    ASPIRIN 81 MG EC TABLET    Take 81 mg by mouth daily. BUPROPION (WELLBUTRIN) 75 MG TABLET    Take 1 tablet by mouth 2 times daily. CYCLOBENZAPRINE (FLEXERIL) 10 MG TABLET    Take 10 mg by mouth 3 times daily as needed for Muscle spasms    DULOXETINE (CYMBALTA) 60 MG CAPSULE    Take 1 capsule by mouth daily. GLUCOSE BLOOD VI TEST STRIPS (JALIL CONTOUR NEXT TEST) STRIP    1 each by Does not apply route daily. As needed. METFORMIN (GLUCOPHAGE) 1000 MG TABLET    Take 1 tablet by mouth 2 times daily (with meals). ONDANSETRON (ZOFRAN ODT) 4 MG DISINTEGRATING TABLET    Take 1 tablet by mouth every 8 hours as needed for Nausea or Vomiting    PROPRANOLOL (INDERAL) 20 MG TABLET    take 1 tablet by mouth twice a day    SUMATRIPTAN (IMITREX) 50 MG TABLET    Take 50 mg by mouth once as needed for Migraine       ALLERGIES     Patient has no known allergies.     FAMILY HISTORY       Family History   Problem Relation Age of Onset    Cancer Father     High Blood Pressure Father           SOCIAL HISTORY       Social History     Socioeconomic History    Marital status: Single     Spouse name: None    Number of children: None    Years of education: None    Highest education level: None   Occupational History    None   Social Needs    Financial resource strain: None    Food insecurity     Worry: None     Inability: None    Transportation needs     Medical: None     Non-medical: None   Tobacco Use    Smoking status: Never Smoker    Smokeless tobacco: Never Used   Substance and Sexual Activity    Alcohol use: No     Comment: quit 5 years ago    Drug use: No    Sexual activity: None   Lifestyle    Physical activity     Days per week: None     Minutes per session: None    Stress: None   Relationships    Social connections     Talks on phone: None     Gets together: None     Attends Worship service: None     Active member of club or organization: None     Attends meetings of clubs or organizations: None     Relationship status: None    Intimate partner violence     Fear of current or ex partner: None     Emotionally abused: None     Physically abused: None     Forced sexual activity: None   Other Topics Concern    None   Social History Narrative    None       SCREENINGS   NIH Stroke Scale  Interval: Reassessment  Level of Consciousness (1a. ): Alert  LOC Questions (1b. ): Answers both correctly  LOC Commands (1c. ): Performs both tasks correctly  Best Gaze (2. ): Normal  Visual (3. ): No visual loss  Facial Palsy (4. ): Normal symmetrical movement  Motor Arm, Left (5a. ): No drift  Motor Arm, Right (5b. ): No drift  Motor Leg, Left (6a. ): No drift  Motor Leg, Right (6b. ): No drift  Limb Ataxia (7. ): Absent  Sensory (8. ): Normal  Best Language (9. ): No aphasia  Dysarthria (10. ): Normal  Extinction and Inattention (11): No abnormality  Total: 0Glasgow Coma Scale  Eye Opening: Spontaneous  Best Verbal Response:  Incomprehensible speech  Best Motor Response: Localizes pain  [place] Coma Scale Score: 11          Review of Systems  Constitutional:  Denies fever, chills  Eyes: denies eye problems  HEENT: denies sore throat or ear pain  Respiratory: denies cough or shortness of breath  Cardiovascular: denies chest pain, palpitations  GI: denies abdominal pain, nausea, vomiting, or diarrhea  Musculoskeletal: denies joint pain  Skin: denies rash  Neurologic: Confusion, difficulty following commands    Except as noted above the remainder of the review of systems was reviewed and negative. PHYSICAL EXAM    (up to 7 for level 4, 8 or more for level 5)     ED Triage Vitals   BP Temp Temp src Pulse Resp SpO2 Height Weight   -- -- -- -- -- -- -- --       General appearance: well-developed, well-nourished, no acute distress, nontoxic appearance  HENT: normocephalic, atraumatic, oropharynx moist, nares patent  Eyes: PERRLA, EOMI, conjunctiva normal  Neck: normal range of motion, no tenderness, trachea midline, no stridor  Respiratory: normal breath sounds, non labored breathing pattern  Cardiovascular: normal heart rate, normal rhythm  GI: nontender, bowel sounds normal, soft, nondistended, no pulsatile masses  Musculoskeletal: no edema, intact distal pulses, no clubbing, cyanosis. Good range of motion  Integument: warm, dry, no erythema, no rash, < 2 second cap refill  Neurologic: alert, moves upper extremities     NIH STROKE SCALE    Time Performed:  18:15    1a. Level of consciousness:  2 - not alert, requires repeated stimulation to attend, or is obtunded and requires strong or painful stimulation to make movements (not stereotyped)   1b. Level of consciousness questions:  2 - answers neither question correctly  1c. Level of consciousness questions:  2 - performs neither task correctly  2. Best Gaze:  0 - normal  3. Visual:  0 - no visual loss  4. Facial Palsy:  0 - normal symmetric movement  5a. Motor left arm:  1 - drift, limb holds 90 (or 45) degrees but drifts down before full 10 seconds: does not hit bed  5b. Motor right arm:  1 - drift, limb holds 90 (or 45) degrees but drifts down before full 10 seconds: does not hit bed  6a. Motor left leg:  3 - no effort against gravity; leg falls to bed immediately  6b. Motor right leg:  3 - no effort against gravity; leg falls to bed immediately  7. Limb Ataxia:  0 - absent  8. Sensory:  0 - normal; no sensory loss  9.     Best Language:  2 - severe aphasia; all Hemoglobin 11.1 (*)     Hematocrit 35.3 (*)     Neutrophils Absolute 10.2 (*)     Monocytes Absolute 0.9 (*)     All other components within normal limits    Narrative:     Performed at:  [address] [place] [address] Laboratory  [address] [place] [address]  [place], [place] [address]   Phone [phone]   BASIC METABOLIC PANEL W/ REFLEX TO MG FOR LOW K - Abnormal; Notable for the following components:    Sodium 129 (*)     Chloride 95 (*)     Glucose 158 (*)     CREATININE 1.5 (*)     GFR Non- 48 (*)     GFR  59 (*)     All other components within normal limits    Narrative:     Performed at:  [address] [place] [address] Laboratory  [address] [place] [address]  [place], [place] [address]   Phone [phone]   BLOOD GAS, VENOUS - Abnormal; Notable for the following components:    pH, Rosalio 7.22 (*)     pCO2, Rosalio 58.4 (*)     pO2, Rosalio 69.5 (*)     Base Excess, Rosalio -5.0 (*)     FIO2 1.0 (*)     All other components within normal limits    Narrative:     Bridget Cristobal tel. [phone],  Panic results handed to [facility] T/DT, 11/04/2020 19:31, by Elisha Georges  Performed at:  [address] [place] [address] Laboratory  [address] [place] [address], [place] [address]   Phone [phone]   DRUG SCREEN MULTI URINE - Abnormal; Notable for the following components:    Opiate Scrn, Ur POSITIVE (*)     Tricyclic POSITIVE (*)     All other components within normal limits    Narrative:     Performed at:  [address] [place] [address] Laboratory  [address] [place], [place] [address], [place] [address]   Phone [phone]   BLOOD GAS, ARTERIAL - Abnormal; Notable for the following components:    pH, Arterial 7.240 (*)     pCO2, Arterial 54.9 (*)     Base Excess, Arterial -4.7 (*)     All other components within normal limits    Narrative:     CALL  Belcher  [facility] tel. [phone],  Panic results handed to [facility] T/DT, 11/04/2020 19:10, by Elisha Georges  Panic results handed Carmen T/VIRGILIO, 11/04/2020 19:09, by Thomas Hanley  Performed at:  [address] [facility] Laboratory  [address] [place] [address],  [place], [place] [address]   Phone [phone]   POCT GLUCOSE - Abnormal; Notable for the following components:    POC Glucose 161 (*)     All other components within normal limits    Narrative:     Performed at:  [address] [facility] Laboratory  [address] [place] [address],  [place], [place] [address]   Phone [phone]   POCT GLUCOSE - Normal   CULTURE, BLOOD 1   CULTURE, BLOOD 2   TROPONIN    Narrative:     Performed at:  [address] [facility] Laboratory  [address] [place] [address],  [place], [place] [address]   Phone [phone]   PROTIME-INR    Narrative:     Performed at:  [address] [facility] Laboratory  [address] [place] [address],  [place], [place] [address]   Phone [phone]   APTT    Narrative:     Performed at:  [address] [facility] Laboratory  [address] [place] [address],  [place], [place] [address]   Phone [phone]   LACTIC ACID, PLASMA    Narrative:     Performed at:  [address] [facility] Laboratory  [address] [place] [address],  [place], [place] [address]   Phone [phone]   ETHANOL    Narrative:     Performed at:  [address] [facility] Laboratory  [address] [place] [address],  [place], [place] [address]   Phone [phone]    Narrative:     Performed at:  [address] [facility] Laboratory  [address] [place], [place] [address], [place] [address]   Phone [phone]   URINE RT REFLEX TO CULTURE    Narrative:     Performed at:  [address] [facility] Laboratory  [address] [place] [address],  [place], [place] [address]   Phone [phone]   AMMONIA    Narrative:     Performed at:  [address] [facility] Laboratory  [address] [facility],  [place], [place] [address]   Phone [phone]       All other labs were within normal range or not returned as of this dictation. EMERGENCY DEPARTMENT COURSE and DIFFERENTIAL DIAGNOSIS/MDM:   Vitals:    Vitals:    20   BP: 130/77 123/76 107/64 110/78   Pulse: 115 116 112 111   Resp:     Temp:       TempSrc:       SpO2: 93% 93% 97% 95%   Weight:       Height:             MDM  48-year-old male presents the emergency department last known well time around 1700 hrs. Vital signs stable. Physical exam as above. Accu-Chek was around 160 and the ambulance. He is drowsy but arouses to loud verbal stimuli. Appears to track during that time but then easily becomes confused and less aware. Able to move bilateral upper extremities and 5 out of 5  strength bilaterally. Has some decreased strength in bilateral upper extremities as well as not moving the lower extremities on command. He is able to shake his feet slightly but will not do so on command. Appears extremely aphasic. NIH score 18. Patient taken immediately for CT head and CTA head and neck. Will discuss with neurology for further evaluation and treatment. Patient back from CAT scan. Does have a temperature of 100.2, oxygen saturation of 86% and pulse of 129. Continues to be somnolent. His NIH has improved to 8. He does have some mildly pinpoint pupils and denies taking any drugs. We gave him 1 mg of Narcan and now he is awake and writhing in the bed complaining of pain. Reports that he ingested some heroin orally. Reports he had it probably right before dinner. We will continue monitoring at this time. Discussed with the stroke team at [facility].   They recommend talking to the neurologist as he is waking up it is possibly postictal.    Emergency Department Encounter  Location: [address] [place] [address] Court    Patient: Alena Rebollar  MRN: [id number]  : 1965  Date of evaluation: 2020  ED Provider: Robbie Thomas MD    [address] was checked out to me by Dr. Torie Padilla pending labs and observation. Please see his/her initial documentation for details of the patient's initial ED presentation, physical exam and completed studies. In brief, Love Lim is a 63 y.o. male that presented to the emergency department by EMS after mother noted finding the patient confused while at home. She states that at 5:00 this evening he was at his baseline and that she went to go watch the news and came back 30 minutes later and noted that he had abnormal respiration and was lethargic and was concerned so she called EMS to bring him to the hospital for further evaluation. Patient's care was given by Dr. Torie Padilla prior to shift change. At shift change we were awaiting lab results and observation. Dr. Torie Padilla had given the patient Narcan prior to shift change patient became alert and oriented x3 conversing in full sentences and at his reported baseline. Patient stated that he took heroin prior to arrival secondary to a flareup of his back pain and that he did not have any pain medication so he used heroin to try and help with his chronic back pain. Patient denies any chest pain shortness of breath or any focal signs or symptoms. Patient states that he feels much better. He requests that nobody knows about his heroin use and that he does not request any help for rehab. Patient is not suicidal or homicidal.  Dr. Torie Padilla prior to shift change is spoken to mother updating her.     I have reviewed and interpreted all of the currently available lab results and diagnostics from this visit:  Results for orders placed or performed during the hospital encounter of 11/04/20   CBC Auto Differential   Result Value Ref Range    WBC 13.3 (H) 4.0 - 11.0 K/uL    RBC 3.88 (L) 4.50 - 6.00 M/uL    Hemoglobin 11.1 (L) 13.0 - 18.0 g/dL    Hematocrit 35.3 (L) 40.0 - 54.0 %    MCV 91.0 80.0 - 100.0 fL    MCH 28.6 27.0 - 32.0 pg    MCHC 31.4 31.0 - 35.0 g/dL    RDW 13.1 11.0 - 16.0 %    Platelets 317 150 - 400 K/uL    MPV 8.8 6.0 - 10.0 fL    Neutrophils % 76.6 %    Immature Granulocytes % 1.1 0.0 - 5.0 %    Lymphocytes % 12.0 %    Monocytes % 7.1 %    Eosinophils % 2.6 %    Basophils % 0.6 %    Neutrophils Absolute 10.2 (H) 2.0 - 7.5 K/uL    Immature Granulocytes # 0.1 K/uL    Lymphocytes Absolute 1.6 1.5 - 4.0 K/uL    Monocytes Absolute 0.9 (H) 0.2 - 0.8 K/uL    Eosinophils Absolute 0.3 0.0 - 0.4 K/uL    Basophils Absolute 0.1 0.0 - 0.1 K/uL   Basic Metabolic Panel w/ Reflex to MG   Result Value Ref Range    Sodium 129 (L) 136 - 145 mmol/L    Potassium reflex Magnesium 4.2 3.4 - 5.1 mmol/L    Chloride 95 (L) 98 - 107 mmol/L    CO2 23 20 - 30 mmol/L    Anion Gap 11 3 - 16    Glucose 158 (H) 74 - 106 mg/dL    BUN 7 6 - 20 mg/dL    CREATININE 1.5 (H) 0.4 - 1.2 mg/dL    GFR Non- 48 (L) >59    GFR  59 (L) >59    Calcium 8.8 8.5 - 10.5 mg/dL   Troponin   Result Value Ref Range    Troponin <0.30 <0.30 ng/mL   Protime-INR   Result Value Ref Range    Protime 13.0 11.7 - 14.6 sec    INR 0.99 0.87 - 1.14   APTT   Result Value Ref Range    aPTT 26.0 21.6 - 33.4 sec   Lactic Acid, Plasma   Result Value Ref Range    Lactic Acid 1.8 0.4 - 2.0 mmol/L   Blood Gas, Venous   Result Value Ref Range    pH, Rosalio 7.22 (LL) 7.35 - 7.45    pCO2, Rosalio 58.4 (H) 40.0 - 50.0 mmHg    pO2, Rosalio 69.5 (H) 25.0 - 40.0 mmHg    HCO3, Venous 23.2 23.0 - 29.0 mmol/L    Base Excess, Rosalio -5.0 (L) -3.0 - 3.0 mmol/L    O2 Sat, Rosalio 91 Not Established %    TC02 (Calc), Rosalio 25 Not Established mmol/L    O2 Therapy Unknown     FIO2 1.0 (L) Not Established %   Drug Screen, Multiple, Urine   Result Value Ref Range    PCP Screen, Urine Neg Negative <25 ng/mL    Benzodiazepine Screen, Urine Neg Negative <300 ng/mL    Cocaine Metabolite Screen, Urine Neg Negative <300 ng/mL    Amphetamine Screen, Urine Neg Negative <1000 ng/mL    Cannabinoid Scrn, Ur Neg Negative <50 ng/mL    Opiate Scrn, Ur POSITIVE (A) Negative <300 ng/mL Barbiturate Screen, Ur Neg Negative <162 ng/mL    Tricyclic POSITIVE (A) Negative <300 ng/mL    Methadone Screen, Urine Neg Negative <300 ng/ml    Propoxyphene Screen, Urine Neg Negative <300 ng/mL    Methamphetamine, Urine Neg Negative <1000 ng/mL    UR Oxycodone Rapid Screen Neg Negative <100 ng/mL    Drug Screen Comment: see below    Ethanol   Result Value Ref Range    Ethanol Lvl None Detected mg/dL   COVID-19   Result Value Ref Range    SARS-CoV-2, NAAT Not Detected Not Detected    SARS-CoV-2, PCR Not performed Not Detected   Blood Gas, Arterial   Result Value Ref Range    pH, Arterial 7.240 (LL) 7.350 - 7.450    pCO2, Arterial 54.9 (H) 35.0 - 45.0 mmHg    pO2, Arterial 86.3 80.0 - 100.0 mmHg    HCO3, Arterial 23.0 22.0 - 26.0 mmol/L    Base Excess, Arterial -4.7 (L) -3.0 - 3.0 mmol/L    O2 Sat, Arterial 95.0 >92 %    TCO2, Arterial 24.7 24.0 - 30.0 mmol/L    O2 Therapy Unknown     FIO2 0.96 Not Established %   Urine Reflex to Culture    Specimen: Urine, straight catheter   Result Value Ref Range    Color, UA Yellow Straw/Yellow    Clarity, UA Clear Clear    Glucose, Ur Negative Negative mg/dL    Bilirubin Urine Negative Negative    Ketones, Urine Negative Negative mg/dL    Specific Gravity, UA 1.015 1.005 - 1.030    Blood, Urine Negative Negative    pH, UA 6.0 5.0 - 8.0    Protein, UA Negative Negative mg/dL    Urobilinogen, Urine 0.2 <2.0 E.U./dL    Nitrite, Urine Negative Negative    Leukocyte Esterase, Urine Negative Negative    Microscopic Examination Not Indicated     Urine Type Catheter     Urine Reflex to Culture Not Indicated    Ammonia   Result Value Ref Range    Ammonia 94 27 - 102 mcg/dL   POCT Glucose   Result Value Ref Range    Glucose 161 mg/dL    QC OK?  yes    POCT Glucose   Result Value Ref Range    POC Glucose 161 (H) 74 - 106 mg/dl    Performed on ACCU-CHEK      Ct Head Wo Contrast    Result Date: 11/4/2020  EXAM: CT HEAD WO CONTRAST INDICATION: aphasia COMPARISON: September 15, 2019 arteries are patent with normal course, contour, and caliber. The basilar artery and both posterior cerebral arteries are patent. CTA Head: No steno-occlusive disease or aneurysm. CTA Neck: Prominent noncalcified mural thrombus posteriorly along the right carotid bifurcation proximal right cervical internal carotid artery with 50% stenosis by NASCET criteria. No flow-limiting steno-occlusive disease. Cta Head W Contrast    Result Date: 11/4/2020  EXAM: CTA NECK W CONTRAST, CTA HEAD W CONTRAST INDICATION: stroke COMPARISON: Head CT dated November 4, 2020 TECHNIQUE: Standard CTA NECK W CONTRAST, CTA HEAD W CONTRAST obtained with 3D reconstructions performed on a separate workstation using a radiologist approved protocol. MIP, MPR, and VRT images were reconstructed in multiple planes and interpreted along with source images. 100 mL Isovue-370 was administered IV. Up-to-date CT equipment and radiation dose reduction techniques were employed. FINDINGS: The aortic arch is normal with conventional branching anatomy. Extensive of noncalcified mural thrombus is present along the posterior wall of the right carotid bifurcation and proximal right cervical internal carotid artery with approximately 50% stenosis by NASCET criteria. Mild calcifications of the left carotid bifurcation without stenosis. The intracranial internal carotid arteries are normal in course, contour, and caliber. The A1 and A2 segments are patent bilaterally. The M1 and M2 segments are patent bilaterally. The external carotid arteries are patent. The vertebral arteries are patent with normal course, contour, and caliber. The basilar artery and both posterior cerebral arteries are patent. CTA Head: No steno-occlusive disease or aneurysm. CTA Neck: Prominent noncalcified mural thrombus posteriorly along the right carotid bifurcation proximal right cervical internal carotid artery with 50% stenosis by NASCET criteria.  No flow-limiting steno-occlusive disease. Final ED Course and MDM: In brief, Devendra Phillips is a 63 y.o. male whose care was signed out to me by the outgoing provider. In brief, patient's care was taken over at 1900 hrs. from Dr. Lindsey Leonard pending observation and lab results. Upon reexamination patient is alert and oriented x3 no acute distress conversing in full sentences. Patient admits to using heroin at approximately 5 PM secondary to what he described as a flareup of his chronic back pain and that he did not have any more pain medicine at home so we used heroin. Patient admits to being on tricyclic antidepressants for his depression but states that he did not take any extra. Review of patient's labs consistent with a GFR of 48. Patient was given 1 L IV normal saline bolus. Patient's white count was 13.3 thousand. Urine drug screen was consistent with opioids and tricyclic antidepressants. Patient states that he takes chronically tricyclic antidepressant medication and that he used heroin prior to arrival.  Patient's troponin was negative and glucose was 158. Patient's ammonia level is 93. Urinalysis was within normal limits. CT head revealed no acute process per radiology. CTA head and neck obtained by Dr. Lindsey Leonard revealed no aneurysm or occlusive disease. Did note on CTA neck of a prominent noncalcified thrombus posteriorly along the right carotid bifurcation with approximate 50% stenosis. Patient was observed for over 3 hours and had total resolution of his symptoms. He is alert and oriented x3 with a GCS of 15. Patient was apologetic about using heroin for his back pain. Patient declined any rehab intervention and stated that he is ready to go home.   Patient is very appreciative the care and agrees with the plan above    ED Medication Orders (From admission, onward)    Start Ordered     Status Ordering Provider    11/04/20 2000 11/04/20 1951  0.9 % sodium chloride bolus  ONCE      Last MAR action: Stopped - by Arlyne Comfort on 11/04/20 at [address]    11/04/20 1900 11/04/20 1845  naloxone (NARCAN) injection 1 mg  ONCE      Last MAR action:  Given - by Arlyne Comfort on 11/04/20 at [address]    11/04/20 1900 11/04/20 1847  acetaminophen (TYLENOL) suppository 650 mg  ONCE      Last MAR action:  Given - by Arlyne Comfort on 11/04/20 at [address]    11/04/20 1852 11/04/20 1852  naloxone (NARCAN) 0.4 MG/ML injection     Note to Pharmacy:  Sergio Ba: cabinet override    Last MAR action:  Canceled Entry - by Arlyne Comfort on 11/04/20 at 1906     11/04/20 1840 11/04/20 1840  iopamidol (ISOVUE-370) 76 % injection 100 mL  IMG ONCE PRN      Last MAR action:  Given - by Taina Box on 11/04/20 at 1720 [facility]    11/04/20 1830 11/04/20 1826  0.9 % sodium chloride bolus  ONCE      Last MAR action:  Stopped - by Arlyne Comfort on 11/04/20 at 2640 [facility]          Final Impression      1. Transient alteration of awareness Stable   2. Accidental overdose of heroin, initial encounter ([facility] Stable   3. Chronic pain syndrome Stable       DISPOSITION       (Please note that portions of this note may have been completed with a voice recognition program. Efforts were made to edit the dictations but occasionally words are mis-transcribed.)    Red Loaiza MD   [employer]      CONSULTS:  None      FINAL IMPRESSION      1. Transient alteration of awareness Stable   2. Accidental overdose of heroin, initial encounter ([facility] Stable   3. Chronic pain syndrome Stable         DISPOSITION/PLAN   DISPOSITION        PATIENT REFERRED TO:  Usmanjoyce Wisdom  [address] [address]  [phone]    Schedule an appointment as soon as possible for a visit in 2 days      [facility]  [address]   [facility]  [phone]  In 2 days  If symptoms worsen      DISCHARGE MEDICATIONS:  New Prescriptions    No medications on file          (Please note that portions of this note were completed with a voice recognition program.  Efforts were made to edit the dictations but occasionally words are mis-transcribed.)    Shailesh Cuello MD (electronically signed)  Attending Emergency Physician      Shailesh Cuello MD  11/04/20 6068

## 2020-11-04 NOTE — ED NOTES
Patient placed back on 15 liter nonrebreather at this time for decreased oxygen sats.      Artur Wray RN  11/04/20 0959

## 2020-11-04 NOTE — ED NOTES
Report received from [facility]. Patient is a 63year old male. CC altered mental status, aphasia. Alert, but not speaking. , /90, RR 22, 86% on RA, switched to NRB per EMS. Glucose 160, temp 98.9 axillary. Last known well 17:35p.m. Patient arrived to unit and taken straight to CT scan. Re: stroke alert.       Rodrick Garland RN  11/04/20 7301

## 2020-11-05 NOTE — ED NOTES
Patient placed in a gown to wear home d/t patients shirts cut off by ems pta, patient had his jeans and boots on.      Rere Elliott RN  11/04/20 4545

## 2020-11-05 NOTE — ED NOTES
Dc instructions given to patient at this time, patient also given copy of paperwork in regards to not taking his metformin until after 1830 on 11/6/2020. Patient verbalized understanding and no further questions or concerns. Patient encouraged to follow up with pcp or return to ER for worsening symptoms. Patient ambulated out to mom waiting for him outside without difficulty.      Jamal Eduardo RN  11/04/20 6337

## 2020-11-05 NOTE — ED NOTES
Talked with patient at this time in regards to having peer support talk with him in regards to taking drugs, patient declined assistance at this time. Patient was told that if he changes his mind to call.      Janey Soriano RN  11/04/20 0869

## 2020-11-05 NOTE — ED NOTES
Patients mom Mark Baumann [phone] called at this time to come  patient, states that she will be on her way.      Nomi Lopez RN  11/04/20 2149       Nomi Lopez RN  11/04/20 2208

## 2020-11-05 NOTE — ED NOTES
NRB removed at this time, patient placed on 3 liters per nasal cannula at this time.      Masoud Camejo RN  11/04/20 2008

## 2020-11-05 NOTE — ED NOTES
Patient stood next to stretcher to void at this time without difficulty.      Alondra Coronel RN  11/04/20 0012

## 2020-11-05 NOTE — ED NOTES
Patient informed me at this time that I could talk to his mother and discuss everything with her.      Lalit Delcid RN  11/04/20 4686

## 2020-11-09 LAB
BLOOD CULTURE, ROUTINE: NORMAL
CULTURE, BLOOD 2: NORMAL

## 2020-11-20 NOTE — ED PROVIDER NOTES
[address] [facility]       Pt Name: Angela Monique  MRN: [id number]  [place] 1965  Date of evaluation: 11/4/2020  Provider: Lashell Lira MD     CHIEF COMPLAINT            Chief Complaint   Patient presents with    Altered Mental Status            HISTORY OF PRESENT ILLNESS   (Location/Symptom, Timing/Onset, Context/Setting, Quality, Duration, Modifying Factors, Severity)  Note limiting factors. Angela Monique is a 63 y.o. male who presents to the emergency department with complaint of altered mental status. Mother reports that he was normal and awake and alert around 5 PM.  States she came back in after watching the news around 530 after she heard some strange breathing noises. States his arms were clenched and he was unable to speak to her. EMS was called and arrived on the scene. His Accu-Chek was 160. He appears confused but when yelled out at loudly will track and appear to regain awareness. Can answer simple yes and no questions unable to squeeze hands bilaterally but unable to follow most other commands such as leg movement. Appears to be aphasic. History is limited secondary this. Mother reports history of asthma and frequent migraines.   Has been seen here multiple times for migraines.           Appropriate PPE was used including an eye shield, gloves, N95 mask, surgical mask during the entire patient encounter and exam.  If necessary (pt being intubated or aerosolizing equipment in use) a gown was also used.     Nursing Notes were reviewed.        PAST MEDICAL HISTORY      Past Medical History        Past Medical History:   Diagnosis Date    Asthma      Cancer ([address]       melanoma on back    Chronic back pain      Depression      Elevated liver enzymes      Erectile dysfunction      Headache      Hyperlipidemia      Hypertension      Hypogonadism      Melanoma ([address]      Memory loss      S/P Botox injection       neck x12, face x6    Type II or unspecified type diabetes mellitus without mention of complication, not stated as uncontrolled                SURGICAL HISTORY        Past Surgical History         Past Surgical History:   Procedure Laterality Date    COLONOSCOPY N/A 10/29/2020     COLORECTAL CANCER SCREENING, NOT HIGH RISK performed by Av Kraus MD at [address] [facility]         back    TONSILLECTOMY        UPPER GASTROINTESTINAL ENDOSCOPY N/A 10/1/2020     EGD BIOPSY performed by Av Kraus MD at [address]             Previous Medications     ASPIRIN 81 MG EC TABLET    Take 81 mg by mouth daily.     BUPROPION (WELLBUTRIN) 75 MG TABLET    Take 1 tablet by mouth 2 times daily.     CYCLOBENZAPRINE (FLEXERIL) 10 MG TABLET    Take 10 mg by mouth 3 times daily as needed for Muscle spasms     DULOXETINE (CYMBALTA) 60 MG CAPSULE    Take 1 capsule by mouth daily.     GLUCOSE BLOOD VI TEST STRIPS (JALIL CONTOUR NEXT TEST) STRIP    1 each by Does not apply route daily.  As needed.     METFORMIN (GLUCOPHAGE) 1000 MG TABLET    Take 1 tablet by mouth 2 times daily (with meals).     ONDANSETRON (ZOFRAN ODT) 4 MG DISINTEGRATING TABLET    Take 1 tablet by mouth every 8 hours as needed for Nausea or Vomiting     PROPRANOLOL (INDERAL) 20 MG TABLET    take 1 tablet by mouth twice a day     SUMATRIPTAN (IMITREX) 50 MG TABLET    Take 50 mg by mouth once as needed for Migraine         ALLERGIES     Patient has no known allergies.     FAMILY HISTORY        Family History         Family History   Problem Relation Age of Onset    Cancer Father      High Blood Pressure Father                SOCIAL HISTORY        Social History   Social History            Socioeconomic History    Marital status: Single       Spouse name: None    Number of children: None    Years of education: None    Highest education level: None   Occupational History    None   Social Needs    Financial resource strain: None    Food insecurity       Worry: None       Inability: None    Transportation needs       Medical: None       Non-medical: None   Tobacco Use    Smoking status: Never Smoker    Smokeless tobacco: Never Used   Substance and Sexual Activity    Alcohol use: No       Comment: quit 5 years ago    Drug use: No    Sexual activity: None   Lifestyle    Physical activity       Days per week: None       Minutes per session: None    Stress: None   Relationships    Social connections       Talks on phone: None       Gets together: None       Attends Confucianism service: None       Active member of club or organization: None       Attends meetings of clubs or organizations: None       Relationship status: None    Intimate partner violence       Fear of current or ex partner: None       Emotionally abused: None       Physically abused: None       Forced sexual activity: None   Other Topics Concern    None   Social History Narrative    None           SCREENINGS   NIH Stroke Scale  Interval: Reassessment  Level of Consciousness (1a. ): Alert  LOC Questions (1b. ): Answers both correctly  LOC Commands (1c. ): Performs both tasks correctly  Best Gaze (2. ): Normal  Visual (3. ): No visual loss  Facial Palsy (4. ): Normal symmetrical movement  Motor Arm, Left (5a. ): No drift  Motor Arm, Right (5b. ): No drift  Motor Leg, Left (6a. ): No drift  Motor Leg, Right (6b. ): No drift  Limb Ataxia (7. ): Absent  Sensory (8. ): Normal  Best Language (9. ): No aphasia  Dysarthria (10. ): Normal  Extinction and Inattention (11): No abnormality  Total: 0Glasgow Coma Scale  Eye Opening: Spontaneous  Best Verbal Response:  Incomprehensible speech  Best Motor Response: Localizes pain  Carina Coma Scale Score: 11             Review of Systems  Constitutional:  Denies fever, chills  Eyes: denies eye problems  HEENT: denies sore throat or ear pain  Respiratory: denies cough or shortness of breath  Cardiovascular: denies chest pain, palpitations  GI: denies abdominal pain, nausea, vomiting, or diarrhea  Musculoskeletal: denies joint pain  Skin: denies rash  Neurologic: Confusion, difficulty following commands     Except as noted above the remainder of the review of systems was reviewed and negative.         PHYSICAL EXAM    (up to 7 for level 4, 8 or more for level 5)      ED Triage Vitals   BP Temp Temp src Pulse Resp SpO2 Height Weight   -- -- -- -- -- -- -- --         General appearance: well-developed, well-nourished, no acute distress, nontoxic appearance  HENT: normocephalic, atraumatic, oropharynx moist, nares patent  Eyes: PERRLA, EOMI, conjunctiva normal  Neck: normal range of motion, no tenderness, trachea midline, no stridor  Respiratory: normal breath sounds, non labored breathing pattern  Cardiovascular: normal heart rate, normal rhythm  GI: nontender, bowel sounds normal, soft, nondistended, no pulsatile masses  Musculoskeletal: no edema, intact distal pulses, no clubbing, cyanosis. Good range of motion  Integument: warm, dry, no erythema, no rash, < 2 second cap refill  Neurologic: alert, moves upper extremities      NIH STROKE SCALE     Time Performed:  18:15    1a. Level of consciousness:  2 - not alert, requires repeated stimulation to attend, or is obtunded and requires strong or painful stimulation to make movements (not stereotyped)   1b. Level of consciousness questions:  2 - answers neither question correctly  1c. Level of consciousness questions:  2 - performs neither task correctly  2. Best Gaze:  0 - normal  3. Visual:  0 - no visual loss  4. Facial Palsy:  0 - normal symmetric movement  5a. Motor left arm:  1 - drift, limb holds 90 (or 45) degrees but drifts down before full 10 seconds: does not hit bed  5b. Motor right arm:  1 - drift, limb holds 90 (or 45) degrees but drifts down before full 10 seconds: does not hit bed  6a.   Motor left leg:  3 - no effort against gravity; leg falls to bed immediately  6b. Motor right leg:  3 - no effort against gravity; leg falls to bed immediately  7. Limb Ataxia:  0 - absent  8. Sensory:  0 - normal; no sensory loss  9. Best Language:  2 - severe aphasia; all communication is through fragmentary expression; great need for inference, questioning, and guessing by the listener. Range of information that can be exchanged is limited; listener carries burden of communication. Examiner cannot identify materials provided from patient response. 10.  Dysarthria:  2 - severe; patient speech is so slurred as to be unintelligible in the absence of or our of proportion to any dysphagia, or is mute/anarthric   11. Extinction and Inattention:  0 - no abnormality     TOTAL:  18        DIAGNOSTIC RESULTS      EKG: Sinus tachycardia, rightward axis, rate 128, prolonged QRS, normal QT, no ST depression or elevation, normal T wave, PVC noted     All EKG's are interpreted by the Emergency Department Physician who either signs or Co-signs this chart in the absence of a cardiologist.        RADIOLOGY:   Interpretation per the Radiologist below, if available at the time of this note:     XR CHEST PORTABLE   Final Result       Mild right basilar airspace disease, atelectasis versus pneumonia.       CTA NECK W CONTRAST   Final Result       CTA Head:   No steno-occlusive disease or aneurysm.       CTA Neck:   Prominent noncalcified mural thrombus posteriorly along the right carotid bifurcation proximal right cervical internal carotid artery with 50% stenosis by NASCET criteria. No flow-limiting steno-occlusive disease.               CTA HEAD W CONTRAST   Final Result       CTA Head:   No steno-occlusive disease or aneurysm.       CTA Neck:   Prominent noncalcified mural thrombus posteriorly along the right carotid bifurcation proximal right cervical internal carotid artery with 50% stenosis by NASCET criteria.  No flow-limiting steno-occlusive disease.               CT Head WO Contrast   Final Result       No acute stroke, mass, or hemorrhage.                LABS:  Labs Reviewed   CBC WITH AUTO DIFFERENTIAL - Abnormal; Notable for the following components:       Result Value      WBC 13.3 (*)       RBC 3.88 (*)       Hemoglobin 11.1 (*)       Hematocrit 35.3 (*)       Neutrophils Absolute 10.2 (*)       Monocytes Absolute 0.9 (*)       All other components within normal limits     Narrative:      Performed at:  [address] [place] [address] Laboratory  [address] [place], [place] [address], [place] [address]   Phone [phone]   BASIC METABOLIC PANEL W/ REFLEX TO MG FOR LOW K - Abnormal; Notable for the following components:     Sodium 129 (*)       Chloride 95 (*)       Glucose 158 (*)       CREATININE 1.5 (*)       GFR Non- 48 (*)       GFR  59 (*)       All other components within normal limits     Narrative:      Performed at:  [address] [place] [address] Laboratory  [address] [place] [address]  [place], [place] [address]   Phone [phone]   BLOOD GAS, VENOUS - Abnormal; Notable for the following components:     pH, Rosalio 7.22 (*)       pCO2, Rosalio 58.4 (*)       pO2, Rosalio 69.5 (*)       Base Excess, Rosalio -5.0 (*)       FIO2 1.0 (*)       All other components within normal limits     Narrative:      CALL  [place]  Raynell Dakin [phone],  Panic results handed to [facility] T/DT, 11/04/2020 19:31, by TQYMM  Performed at:  [address] [place] [address] Laboratory  [address] [place], [place] [address], [place] [address]   Phone [phone]   DRUG SCREEN MULTI URINE - Abnormal; Notable for the following components:     Opiate Scrn, Ur POSITIVE (*)       Tricyclic POSITIVE (*)       All other components within normal limits     Narrative:      Performed at:  [address] [place] [address] Laboratory  [address] [place], [place] [address], [place] [address]   Phone [phone]   BLOOD GAS, ARTERIAL - Abnormal; Notable for the following components:     pH, Arterial 7.240 (*)       pCO2, Arterial 54.9 (*)       Base Excess, Arterial -4.7 (*)       All other components within normal limits     Narrative:      CALL  [place]  [facility] tel. [phone],  Panic results handed to [facility] T/DT, 11/04/2020 19:10, by Noe Ibrahim  Panic results handed [facility] T/DT, 11/04/2020 19:09, by Noe Ibrahim  Performed at:  [facility] Laboratory  [address] [place] [address],  [place], [place] [address]   Phone [phone]   POCT GLUCOSE - Abnormal; Notable for the following components:     POC Glucose 161 (*)       All other components within normal limits     Narrative:      Performed at:  [address] [place] [address] Laboratory  [address] [place] [address],  [place], [place] [address]   Phone [phone]   POCT GLUCOSE - Normal   CULTURE, BLOOD 1   CULTURE, BLOOD 2   TROPONIN     Narrative:      Performed at:  [address] [place] [address] Laboratory  [address] [place] [address],  [place], [place] [address] [address]   Phone [phone]     Narrative:      Performed at:  [address] [place] [address] Laboratory  [address] [place] [address],  [place], [place] [address]   Phone [phone]   APTT     Narrative:      Performed at:  [address] [place] [address] Laboratory  [address] [place] [address],  [place], [place] [address]   Phone [phone]   LACTIC ACID, PLASMA     Narrative:      Performed at:  [address] [place] [address] Laboratory  [address] [place] [address],  [place], [place] [address] [address]   Phone [phone]   ETHANOL     Narrative:      Performed at:  [address] [place] [address] Laboratory  [address] [place] [address],  [place], [place] [address]   Phone [phone]     Narrative:      Performed at:  [address] [place] [address] Laboratory  [address] [place] [address],  [place], [place] [address]   Phone [phone]   URINE RT REFLEX TO CULTURE     Narrative:      Performed at:  [address] and [facility] Laboratory  [address] [facility] [place] [address]   Phone [phone]   AMMONIA     Narrative:      Performed at:  [address] [facility] Laboratory  [address] [facility] [place] [address]   Phone [phone]         All other labs were within normal range or not returned as of this dictation.     EMERGENCY DEPARTMENT COURSE and DIFFERENTIAL DIAGNOSIS/MDM:   Vitals:    Vitals          Vitals:     11/04/20 2045 11/04/20 2050 11/04/20 2118 11/04/20 2130   BP: 130/77 123/76 107/64 110/78   Pulse: 115 116 112 111   Resp: 19 21 20     Temp:           TempSrc:           SpO2: 93% 93% 97% 95%   Weight:           Height:                      MDM  58-year-old male presents the emergency department last known well time around 1700 hrs. Vital signs stable. Physical exam as above. Accu-Chek was around 160 and the ambulance. He is drowsy but arouses to loud verbal stimuli. Appears to track during that time but then easily becomes confused and less aware. Able to move bilateral upper extremities and 5 out of 5  strength bilaterally. Has some decreased strength in bilateral upper extremities as well as not moving the lower extremities on command. He is able to shake his feet slightly but will not do so on command. Appears extremely aphasic. NIH score 18. Patient taken immediately for CT head and CTA head and neck. Will discuss with neurology for further evaluation and treatment.     Patient back from CAT scan. Does have a temperature of 100.2, oxygen saturation of 86% and pulse of 129. Continues to be somnolent. His NIH has improved to 8. He does have some mildly pinpoint pupils and denies taking any drugs. We gave him 1 mg of Narcan and now he is awake and writhing in the bed complaining of pain. Reports that he ingested some heroin orally. Reports he had it probably right before dinner.   We will continue monitoring at this time.    Diagnosis  Unintentional opioid overdose        Mirza Alan DO  11/20/20 3401

## 2021-01-19 ENCOUNTER — TELEPHONE (OUTPATIENT)
Dept: SURGERY | Facility: CLINIC | Age: 56
End: 2021-01-19

## 2021-01-19 NOTE — TELEPHONE ENCOUNTER
Pt needs to be scheduled for a colonoscopy at [facility] with extended prep.  Last colonoscopy had very poor prep.  He needs 2 days of liquid diet, 2 bottles of Magnesium citrate at 9am and 9pm the day of his prep.  Stop eating veggies a week before and take miralax for at least 2 weeks daily as directed on the bottle before having the scope.

## 2021-07-16 ENCOUNTER — HOSPITAL ENCOUNTER (EMERGENCY)
Facility: HOSPITAL | Age: 56
Discharge: HOME | End: 2021-07-16
Attending: FAMILY MEDICINE
Payer: MEDICARE

## 2021-07-16 VITALS
RESPIRATION RATE: 16 BRPM | DIASTOLIC BLOOD PRESSURE: 92 MMHG | BODY MASS INDEX: 26.52 KG/M2 | HEART RATE: 99 BPM | SYSTOLIC BLOOD PRESSURE: 133 MMHG | WEIGHT: 175 LBS | OXYGEN SATURATION: 94 % | TEMPERATURE: 98.2 F | HEIGHT: 68 IN

## 2021-07-16 DIAGNOSIS — G43.809 OTHER MIGRAINE WITHOUT STATUS MIGRAINOSUS, NOT INTRACTABLE: Primary | ICD-10-CM

## 2021-07-16 PROCEDURE — 96374 THER/PROPH/DIAG INJ IV PUSH: CPT

## 2021-07-16 PROCEDURE — 6370000000 HC RX 637 (ALT 250 FOR IP): Performed by: FAMILY MEDICINE

## 2021-07-16 PROCEDURE — 2580000003 HC RX 258: Performed by: FAMILY MEDICINE

## 2021-07-16 PROCEDURE — 99284 EMERGENCY DEPT VISIT MOD MDM: CPT

## 2021-07-16 PROCEDURE — 96375 TX/PRO/DX INJ NEW DRUG ADDON: CPT

## 2021-07-16 PROCEDURE — 6360000002 HC RX W HCPCS: Performed by: FAMILY MEDICINE

## 2021-07-16 RX ORDER — BUTALBITAL, ACETAMINOPHEN AND CAFFEINE 50; 325; 40 MG/1; MG/1; MG/1
1 TABLET ORAL ONCE
Status: COMPLETED | OUTPATIENT
Start: 2021-07-16 | End: 2021-07-16

## 2021-07-16 RX ORDER — OXYCODONE AND ACETAMINOPHEN 10; 325 MG/1; MG/1
1 TABLET ORAL ONCE
Status: COMPLETED | OUTPATIENT
Start: 2021-07-16 | End: 2021-07-16

## 2021-07-16 RX ORDER — KETOROLAC TROMETHAMINE 30 MG/ML
30 INJECTION, SOLUTION INTRAMUSCULAR; INTRAVENOUS ONCE
Status: COMPLETED | OUTPATIENT
Start: 2021-07-16 | End: 2021-07-16

## 2021-07-16 RX ORDER — METOCLOPRAMIDE HYDROCHLORIDE 5 MG/ML
10 INJECTION INTRAMUSCULAR; INTRAVENOUS ONCE
Status: COMPLETED | OUTPATIENT
Start: 2021-07-16 | End: 2021-07-16

## 2021-07-16 RX ORDER — 0.9 % SODIUM CHLORIDE 0.9 %
500 INTRAVENOUS SOLUTION INTRAVENOUS ONCE
Status: COMPLETED | OUTPATIENT
Start: 2021-07-16 | End: 2021-07-16

## 2021-07-16 RX ORDER — BUTALBITAL, ACETAMINOPHEN AND CAFFEINE 50; 325; 40 MG/1; MG/1; MG/1
1 TABLET ORAL EVERY 4 HOURS PRN
Status: DISCONTINUED | OUTPATIENT
Start: 2021-07-16 | End: 2021-07-17 | Stop reason: HOSPADM

## 2021-07-16 RX ORDER — HYDROCODONE BITARTRATE AND ACETAMINOPHEN 5; 325 MG/1; MG/1
1 TABLET ORAL ONCE
Status: COMPLETED | OUTPATIENT
Start: 2021-07-16 | End: 2021-07-16

## 2021-07-16 RX ORDER — DIPHENHYDRAMINE HYDROCHLORIDE 50 MG/ML
25 INJECTION INTRAMUSCULAR; INTRAVENOUS ONCE
Status: COMPLETED | OUTPATIENT
Start: 2021-07-16 | End: 2021-07-16

## 2021-07-16 RX ADMIN — BUTALBITAL, ACETAMINOPHEN, AND CAFFEINE 1 TABLET: 50; 325; 40 TABLET ORAL at 21:09

## 2021-07-16 RX ADMIN — BUTALBITAL, ACETAMINOPHEN, AND CAFFEINE 1 TABLET: 50; 325; 40 TABLET ORAL at 22:26

## 2021-07-16 RX ADMIN — HYDROCODONE BITARTRATE AND ACETAMINOPHEN 1 TABLET: 5; 325 TABLET ORAL at 22:26

## 2021-07-16 RX ADMIN — METOCLOPRAMIDE 10 MG: 5 INJECTION, SOLUTION INTRAMUSCULAR; INTRAVENOUS at 21:13

## 2021-07-16 RX ADMIN — SODIUM CHLORIDE 500 ML: 9 INJECTION, SOLUTION INTRAVENOUS at 21:29

## 2021-07-16 RX ADMIN — OXYCODONE HYDROCHLORIDE AND ACETAMINOPHEN 1 TABLET: 10; 325 TABLET ORAL at 21:09

## 2021-07-16 RX ADMIN — KETOROLAC TROMETHAMINE 30 MG: 30 INJECTION, SOLUTION INTRAMUSCULAR; INTRAVENOUS at 21:12

## 2021-07-16 RX ADMIN — DIPHENHYDRAMINE HYDROCHLORIDE 25 MG: 50 INJECTION INTRAMUSCULAR; INTRAVENOUS at 21:10

## 2021-07-16 ASSESSMENT — PAIN DESCRIPTION - DESCRIPTORS: DESCRIPTORS: ACHING

## 2021-07-16 ASSESSMENT — PAIN SCALES - GENERAL
PAINLEVEL_OUTOF10: 5
PAINLEVEL_OUTOF10: 10

## 2021-07-16 ASSESSMENT — PAIN DESCRIPTION - PAIN TYPE: TYPE: ACUTE PAIN;CHRONIC PAIN

## 2021-07-16 ASSESSMENT — PAIN DESCRIPTION - FREQUENCY: FREQUENCY: CONTINUOUS

## 2021-07-16 ASSESSMENT — PAIN DESCRIPTION - LOCATION: LOCATION: HEAD

## 2021-07-16 NOTE — ED TRIAGE NOTES
Pt states that he sees neurology for his migraines and that he was also referred to otolaryngology. Reports state that pt has a fungal ball.   He has an appt to return to discuss surgery on July 26

## 2021-07-16 NOTE — ED TRIAGE NOTES
Pt c/o having a migraine for 2 1/2 days. He states a hx of migraines for which he does take imitrex. He states he took his imitrex today but it did no help.

## 2021-07-17 ASSESSMENT — ENCOUNTER SYMPTOMS: PHOTOPHOBIA: 1

## 2021-07-17 NOTE — ED NOTES
Pt instructed not to drive home. Pt mother on the way to pick him up. Pt verbalized understanding of discharge instructions.       Betsy Swift RN  07/16/21 9044

## 2021-08-11 ENCOUNTER — TRANSCRIBE ORDERS (OUTPATIENT)
Dept: LAB | Facility: HOSPITAL | Age: 56
End: 2021-08-11

## 2021-08-11 DIAGNOSIS — Z01.818 OTHER SPECIFIED PRE-OPERATIVE EXAMINATION: Primary | ICD-10-CM

## 2021-08-19 ENCOUNTER — HOSPITAL ENCOUNTER (EMERGENCY)
Facility: HOSPITAL | Age: 56
Discharge: HOME | End: 2021-08-19
Attending: HOSPITALIST
Payer: MEDICARE

## 2021-08-19 VITALS
TEMPERATURE: 99.7 F | OXYGEN SATURATION: 99 % | DIASTOLIC BLOOD PRESSURE: 98 MMHG | HEIGHT: 68 IN | WEIGHT: 175 LBS | BODY MASS INDEX: 26.52 KG/M2 | SYSTOLIC BLOOD PRESSURE: 150 MMHG | RESPIRATION RATE: 16 BRPM | HEART RATE: 78 BPM

## 2021-08-19 DIAGNOSIS — G89.29 CHRONIC NONINTRACTABLE HEADACHE, UNSPECIFIED HEADACHE TYPE: Primary | ICD-10-CM

## 2021-08-19 DIAGNOSIS — R51.9 CHRONIC NONINTRACTABLE HEADACHE, UNSPECIFIED HEADACHE TYPE: Primary | ICD-10-CM

## 2021-08-19 PROCEDURE — 6360000002 HC RX W HCPCS: Performed by: HOSPITALIST

## 2021-08-19 PROCEDURE — 96375 TX/PRO/DX INJ NEW DRUG ADDON: CPT

## 2021-08-19 PROCEDURE — 96372 THER/PROPH/DIAG INJ SC/IM: CPT

## 2021-08-19 PROCEDURE — 96365 THER/PROPH/DIAG IV INF INIT: CPT

## 2021-08-19 PROCEDURE — 2580000003 HC RX 258: Performed by: HOSPITALIST

## 2021-08-19 PROCEDURE — 99283 EMERGENCY DEPT VISIT LOW MDM: CPT

## 2021-08-19 PROCEDURE — 6370000000 HC RX 637 (ALT 250 FOR IP): Performed by: HOSPITALIST

## 2021-08-19 RX ORDER — SUMATRIPTAN 6 MG/.5ML
6 INJECTION, SOLUTION SUBCUTANEOUS ONCE
Status: COMPLETED | OUTPATIENT
Start: 2021-08-19 | End: 2021-08-19

## 2021-08-19 RX ORDER — DIPHENHYDRAMINE HYDROCHLORIDE 50 MG/ML
25 INJECTION INTRAMUSCULAR; INTRAVENOUS ONCE
Status: COMPLETED | OUTPATIENT
Start: 2021-08-19 | End: 2021-08-19

## 2021-08-19 RX ORDER — 0.9 % SODIUM CHLORIDE 0.9 %
1000 INTRAVENOUS SOLUTION INTRAVENOUS ONCE
Status: COMPLETED | OUTPATIENT
Start: 2021-08-19 | End: 2021-08-19

## 2021-08-19 RX ORDER — KETOROLAC TROMETHAMINE 30 MG/ML
30 INJECTION, SOLUTION INTRAMUSCULAR; INTRAVENOUS ONCE
Status: COMPLETED | OUTPATIENT
Start: 2021-08-19 | End: 2021-08-19

## 2021-08-19 RX ADMIN — SODIUM CHLORIDE 1000 ML: 9 INJECTION, SOLUTION INTRAVENOUS at 09:20

## 2021-08-19 RX ADMIN — DIPHENHYDRAMINE HYDROCHLORIDE 25 MG: 50 INJECTION, SOLUTION INTRAMUSCULAR; INTRAVENOUS at 09:23

## 2021-08-19 RX ADMIN — Medication 25 MG: at 09:26

## 2021-08-19 RX ADMIN — SUMATRIPTAN 6 MG: 6 INJECTION, SOLUTION SUBCUTANEOUS at 09:21

## 2021-08-19 RX ADMIN — KETOROLAC TROMETHAMINE 30 MG: 30 INJECTION, SOLUTION INTRAMUSCULAR; INTRAVENOUS at 09:21

## 2021-08-19 ASSESSMENT — PAIN DESCRIPTION - PAIN TYPE
TYPE: ACUTE PAIN
TYPE: ACUTE PAIN

## 2021-08-19 ASSESSMENT — PAIN SCALES - GENERAL
PAINLEVEL_OUTOF10: 9
PAINLEVEL_OUTOF10: 9
PAINLEVEL_OUTOF10: 8

## 2021-08-19 ASSESSMENT — PAIN DESCRIPTION - LOCATION
LOCATION: HEAD
LOCATION: HEAD

## 2021-08-19 ASSESSMENT — PAIN DESCRIPTION - ORIENTATION: ORIENTATION: LEFT

## 2021-08-19 NOTE — ED PROVIDER NOTES
[address] ENCOUNTER      Pt Name: Jaki Caputo  MRN: [id number]  YOB: 1965  Date of evaluation: 8/19/2021  Provider: Avelina Barrios DO    CHIEF COMPLAINT       Chief Complaint   Patient presents with    Migraine         HISTORY OF PRESENT ILLNESS  (Location/Symptom, Timing/Onset, Context/Setting, Quality, Duration, Modifying Factors, Severity.)   Jaki Caputo is a 61 y.o. male who presents to the emergency department migraine headache. Patient does states he has history of migraine headaches. Advised he usually has at least 3 headaches a week. Patient states that the headache started last night which was very minimal and then progressively worsened overnight till this morning. States he has had some nausea but denies any vomiting. He is photo/phono phobic with his headache. Also states he has increased olfactory sensitivity to smells that would not normally bother him unless he has his headache. Advises that he does follow-up with a neurologist.  Patient states he supposed to have surgery on September 15 secondary to a mass. He states it was initially in the back of his head and now has moved to the left frontal area. But I did asked the patient again that he sure this was a mass because they tend not to move when he states that is what he was told. Patient rates his pain as a 9 out of 10. Pain is all left-sided and states that it goes down the back of his left side of his neck also. Patient does states that he does get written Imitrex for his headaches he takes a 50 mg tablet but advised that he only gets 9 a month so he tries to use those sparingly. States that he took 1 yesterday but has not had any treatment today. Denies any numbness tingling weakness of the extremities out of ordinary. Patient denies his headache being any worse than his other previous headaches this is a common presentation for this patient. Nursing notes were reviewed. REVIEW OFSYSTEMS    (2-9 systems for level 4, 10 or more for level 5)   ROS:  General:  No fevers, no chills, no weakness  Cardiovascular:  No chest pain, no palpitations  Respiratory:  No shortness of breath, no cough, no wheezing  Gastrointestinal:  No pain, + nausea, no vomiting, no diarrhea  Musculoskeletal:  No muscle pain, no joint pain  Skin:  No rash, no easy bruising  Neurologic:  No speech problems, + headache-chronic intermittent, no extremity weakness, + photo/phonophobia, + hyper olfactory sensitivity with headache  Psychiatric:  No anxiety  Genitourinary:  No dysuria, no hematuria    Except as noted above the remainder of the review of systems was reviewed and negative. PAST MEDICAL HISTORY     Past Medical History:   Diagnosis Date    Asthma     Cancer ([facility] [address]     melanoma on back    Chronic back pain     Depression     Elevated liver enzymes     Erectile dysfunction     Headache     Hyperlipidemia     Hypertension     Hypogonadism     Melanoma ([facility] [address]     Memory loss     S/P Botox injection     neck x12, face x6    Type II or unspecified type diabetes mellitus without mention of complication, not stated as uncontrolled          SURGICAL HISTORY       Past Surgical History:   Procedure Laterality Date    COLONOSCOPY N/A 10/29/2020    COLORECTAL CANCER SCREENING, NOT HIGH RISK performed by Kevin Magana MD at [address] [address] [facility]    TONSILLECTOMY      UPPER GASTROINTESTINAL ENDOSCOPY N/A 10/1/2020    EGD BIOPSY performed by Kevin Magana MD at [address] [facility]       Previous Medications    ASPIRIN 81 MG EC TABLET    Take 81 mg by mouth daily. BUPROPION (WELLBUTRIN) 75 MG TABLET    Take 1 tablet by mouth 2 times daily.     CYCLOBENZAPRINE (FLEXERIL) 10 MG TABLET    Take 10 mg by mouth 3 times daily as needed for Muscle spasms    DULOXETINE (CYMBALTA) 60 MG CAPSULE    Take 1 capsule by mouth daily.    GLUCOSE BLOOD VI TEST STRIPS (JALIL CONTOUR NEXT TEST) STRIP    1 each by Does not apply route daily. As needed. METFORMIN (GLUCOPHAGE) 1000 MG TABLET    Take 1 tablet by mouth 2 times daily (with meals). ONDANSETRON (ZOFRAN ODT) 4 MG DISINTEGRATING TABLET    Take 1 tablet by mouth every 8 hours as needed for Nausea or Vomiting    PROPRANOLOL (INDERAL) 20 MG TABLET    take 1 tablet by mouth twice a day    SUMATRIPTAN (IMITREX) 50 MG TABLET    Take 50 mg by mouth once as needed for Migraine       ALLERGIES     Patient has no known allergies. FAMILY HISTORY       Family History   Problem Relation Age of Onset    Cancer Father     High Blood Pressure Father           SOCIAL HISTORY       Social History     Socioeconomic History    Marital status: Single     Spouse name: None    Number of children: None    Years of education: None    Highest education level: None   Occupational History    None   Tobacco Use    Smoking status: Never Smoker    Smokeless tobacco: Never Used   Vaping Use    Vaping Use: Never used   Substance and Sexual Activity    Alcohol use: No     Comment: quit 5 years ago    Drug use: No    Sexual activity: None   Other Topics Concern    None   Social History Narrative    None     Social Determinants of Health     Financial Resource Strain:     Difficulty of Paying Living Expenses:    Food Insecurity:     Worried About Running Out of Food in the Last Year:     Ran Out of Food in the Last Year:    Transportation Needs:     Lack of Transportation (Medical):      Lack of Transportation (Non-Medical):    Physical Activity:     Days of Exercise per Week:     Minutes of Exercise per Session:    Stress:     Feeling of Stress :    Social Connections:     Frequency of Communication with Friends and Family:     Frequency of Social Gatherings with Friends and Family:     Attends [employer] Services:     Active Member of Clubs or Organizations:     Attends Club or Organization Meetings:     Marital Status:    Intimate Partner Violence:     Fear of Current or Ex-Partner:     Emotionally Abused:     Physically Abused:     Sexually Abused:          PHYSICAL EXAM    (up to 7 for level 4, 8 or more for level 5)     ED Triage Vitals   BP Temp Temp src Pulse Resp SpO2 Height Weight   -- -- -- -- -- -- -- --       Physical Exam  General :Patient is awake, alert, oriented, in no acute distress, nontoxic appearing  HEENT: Pupils are equally round and reactive to light, EOMI, conjunctivae clear. Oral mucosa is moist, no exudate. Uvula is midline  Cardiac: Heart regular rate, rhythm, no murmurs, rubs, or gallops  Lungs: Lungs are clear to auscultation, there is no wheezing, rhonchi, or rales. There is no use of accessory muscles. Abdomen: Abdomen is soft, nontender, nondistended. There is no firm or pulsatile masses, no rebound rigidity or guarding. Musculoskeletal: 5 out of 5 strength in all 4 extremities. No focal muscle deficits are appreciated  Neuro: Motor intact, sensory intact, level of consciousness is normal, cerebellar function is normal, reflexes are grossly normal.   Dermatology: Skin is warm and dry  Psych: Mentation is grossly normal, cognition is grossly normal. Affect is appropriate. DIAGNOSTIC RESULTS     EKG: All EKG's are interpreted by the Emergency Department Physician who either signs or Co-signs this chart in the [address] a cardiologist.        RADIOLOGY:   Non-plain film images such as CT, Ultrasound and MRI are read by the radiologist. Plain radiographic images are visualized and preliminarily interpreted by the emergency physician with the below findings:      ? Radiologist's Report Reviewed:  No orders to display         ED BEDSIDE ULTRASOUND:   Performed by ED Physician - none    LABS:    I have reviewed and interpreted all of the currently available lab results from this visit (ifapplicable):  No results found for this visit on 08/19/21. All other labs were within normal range or not returned as of this dictation. EMERGENCY DEPARTMENT COURSE and DIFFERENTIAL DIAGNOSIS/MDM:   Vitals:    Vitals:    08/19/21 0903   BP: (!) 133/104   Resp: 16   Temp: 99.7 °F (37.6 °C)   TempSrc: Oral       MEDICATIONS ADMINISTERED IN ED:  Medications   0.9 % sodium chloride bolus (1,000 mLs Intravenous New Bag 8/19/21 0920)   ketorolac (TORADOL) injection 30 mg (30 mg Intravenous Given 8/19/21 0921)   promethazine (PHENERGAN) in sodium chloride 0.9% 50 mL IVPB 25 mg (25 mg Intravenous New Bag 8/19/21 0926)   diphenhydrAMINE (BENADRYL) injection 25 mg (25 mg Intravenous Given 8/19/21 0923)   SUMAtriptan (IMITREX) injection 6 mg (6 mg Subcutaneous Given 8/19/21 0180)       After initial evaluation and examination I did have a conversation with the patient about the upcoming plan, treatment possible disposition which she was agreeable to the times dictation. Advised that we give him a fluid bolus of normal saline 1 L. Patient be provided Toradol, Phenergan and Benadryl IV for treatment of his headache. We will also provide him with a subcu injection of Imitrex 6mg. Patient advised that after administration medication will observe him here for anywhere between 30 minutes to an hour and reassess him for his headache. Advised that we probably would not be able to make the headache go completely away and that it is not a realistic goal for us. Patient advised that we will not provide him with any narcotic medication for the treatment of the headache I have seen this patient before in the past and he usually will always ask prior to being discharged for something stronger for the headache either IV or orally. Otherwise patient sitting comfortably stretcher no acute distress however he does leave the lights off in the room. Final disposition be determined after administration of this medication and reassessment.     Patient was reassessed for his headache after administration of the medication after a period of observation. He said that his headache really is not much better than what it was. However the patient has pulled his IVs completely out and was bleeding there. Patient advised that I would not give him a narcotic for his headache he states that he is ready to go than. The patient will be discharged home in stable condition. The patient advised that he does need to follow-up with his regular family physician within the next 1 to 2 days reevaluation. Patient was also given instructions of his symptoms worsens or new symptoms arise he should return back to emergency department for further evaluation work-up. Patient advised he probably should follow-up with his neurologist within the next 1 week. CONSULTS:  None    PROCEDURES:  Procedures    CRITICAL CARE TIME    Total Critical Care time was 0 minutes, excluding separately reportable procedures. There was a high probability of clinically significant/life threatening deterioration in the patient's condition which required my urgent intervention. FINAL IMPRESSION      1. Chronic nonintractable headache, unspecified headache type          DISPOSITION/PLAN   DISPOSITION        PATIENT REFERRED TO:  Syed De Leon  [address] [place] [address]  [phone]    In 2 days      [facility]  [address] [facility]  [phone]    As needed, If symptoms worsen    Your neurologist  Follow-up in 1 week          DISCHARGE MEDICATIONS:  New Prescriptions    No medications on file       Comment: Please note this report has been produced using speech recognition software and may contain errorsrelated to that system including errors in grammar, punctuation, and spelling, as well as words and phrases that may be inappropriate. If there are any questions or concerns please feel free to contact the dictating providerfor clarification.     Brigitte Harvey,   Attending Emergency Physician              Rosette Baugh DO  08/19/21 1045

## 2021-08-19 NOTE — ED NOTES
Patient states that he is just ready to be discharged at this time, patient sitting on side of bed, states headache is still raging, but he is ready to go home, no other questions or concerns.      Kristy Paul RN  08/19/21 1319

## 2021-08-19 NOTE — ED NOTES
Tech into checked on patient, pt sitting on bedside, and reports that he pulled his IV out and had fluid running in floor blood dripping in floor. Dressing applied. Pt reports that he pulled his IV out because his head was still hurting and the medicine wasn't helping. Dr. Angel Mau to bedside.        Lucretia Peña RN  08/19/21 6563

## 2022-01-27 ENCOUNTER — OFFICE VISIT (OUTPATIENT)
Dept: UROLOGY | Facility: CLINIC | Age: 57
End: 2022-01-27

## 2022-01-27 ENCOUNTER — PATIENT ROUNDING (BHMG ONLY) (OUTPATIENT)
Dept: UROLOGY | Facility: CLINIC | Age: 57
End: 2022-01-27

## 2022-01-27 VITALS
HEIGHT: 68 IN | BODY MASS INDEX: 27.28 KG/M2 | SYSTOLIC BLOOD PRESSURE: 112 MMHG | DIASTOLIC BLOOD PRESSURE: 74 MMHG | HEART RATE: 94 BPM | WEIGHT: 180 LBS | OXYGEN SATURATION: 99 % | TEMPERATURE: 97.2 F

## 2022-01-27 DIAGNOSIS — E11.65 POORLY CONTROLLED DIABETES MELLITUS: ICD-10-CM

## 2022-01-27 DIAGNOSIS — R35.1 NOCTURIA: ICD-10-CM

## 2022-01-27 DIAGNOSIS — N48.6 PEYRONIE'S DISEASE: Primary | ICD-10-CM

## 2022-01-27 DIAGNOSIS — R39.9 LOWER URINARY TRACT SYMPTOMS (LUTS): ICD-10-CM

## 2022-01-27 PROCEDURE — 99204 OFFICE O/P NEW MOD 45 MIN: CPT | Performed by: UROLOGY

## 2022-01-27 RX ORDER — TAMSULOSIN HYDROCHLORIDE 0.4 MG/1
1 CAPSULE ORAL NIGHTLY
Qty: 30 CAPSULE | Refills: 0 | Status: SHIPPED | OUTPATIENT
Start: 2022-01-27 | End: 2022-02-28

## 2022-01-27 NOTE — PROGRESS NOTES
Chief Complaint  LUTS    Referring Provider  Drake Oscar*    HPI  Mr. Echeverria is a 56 y.o. male with history of diabetes mellitus, hypertension, who presents with lower urinary tract symptoms and Peyronies disease.  Primary symptom includes: urgency, intermittency  Patient denies dysuria or hematuria.  Onset was 6 mo ago.  Previous treatments include: None    IPSS Questionnaire (AUA-7):  Incomplete emptying  Over the past month, how often have you had a sensation of not emptying your bladder completely after you finish?: Less than 1 time in 5 (01/27/22 0836)  Frequency  Over the past month, how often have you had to urinate again less than two hours after you finishing urinating ?: About half the time (01/27/22 0836)  Intermittency  Over the past month, how often have you found you stopped and started again several time when you urinated ?: About half the time (01/27/22 0836)  Urgency  Over the last month, how difficult  have you found it to postpone urination ?: About half the time (01/27/22 0836)  Weak Stream  Over the past month, how often have you had a weak urinary stream ?: Less than half the time (01/27/22 0836)  Straining  Over the past month, how often have you had to push or strain to begin urination ?: Less than half the time (01/27/22 0836)  Nocturia  Over the past month, how many times did you most typically get up to urinate from the time you went to bed until the time you got up in the morning ?: About half the time (01/27/22 0836)  Quality of life due to urinary symptoms  If you were to spend the rest of your life with your urinary condition the way it is now, how would feel about that?: Mostly dissatified (01/27/22 0836)    Scores  Total IPSS Score: 17 (01/27/22 0836)  Total Score = Symtomatic Level: moderately symptomatic: 8-19 (01/27/22 0836)     Patient also complains of Peyronies disease  Duration 7 mo, dorsal 45 deg curvature. Not sexually active. Denies ED. Unsure if he could  penetrate. Fear of curvature prevents him from having sex    No longer takes botox for migraines.    He had melanoma, WLE.     Past Medical History  Past Medical History:   Diagnosis Date   • Anxiety    • Cancer (HCC)    • Depression    • Diabetes mellitus (HCC)    • Hyperlipidemia    • Hypertension    • Migraine        Past Surgical History  Past Surgical History:   Procedure Laterality Date   • AXILLARY LYMPH NODE BIOPSY/EXCISION     • BRAIN SURGERY     • SKIN CANCER EXCISION  2014       Medications    Current Outpatient Medications:   •  aspirin 81 MG EC tablet, Take  by mouth., Disp: , Rfl:   •  buPROPion (WELLBUTRIN) 75 MG tablet, Take  by mouth. Take one tablet by mouth twice daily, Disp: , Rfl:   •  buPROPion SR (Wellbutrin SR) 100 MG 12 hr tablet, Wellbutrin SR, Disp: , Rfl:   •  DULoxetine (Cymbalta) 20 MG capsule, Cymbalta, Disp: , Rfl:   •  DULoxetine (CYMBALTA) 60 MG capsule, Take  by mouth. Take one capsule by mouth daily, Disp: , Rfl:   •  glucose blood test strip, , Disp: , Rfl:   •  glucose blood test strip, OneTouch Ultra Blue Test Strip, Disp: , Rfl:   •  HYDROcodone-acetaminophen (NORCO)  MG per tablet, Take  by mouth., Disp: , Rfl:   •  METFORMIN & DIET MANAGE PROD PO, metformin, Disp: , Rfl:   •  metFORMIN (GLUCOPHAGE) 1000 MG tablet, Take  by mouth. Take one tablet by mouth twice daily, Disp: , Rfl:   •  MethylPREDNISolone (MEDROL, JUDITH,) 4 MG tablet, , Disp: , Rfl: 0  •  OnabotulinumtoxinA 200 units reconstituted solution, INJECT 155 UNITS IM TO HEAD AND NECK AREA EVERY 12 WEEKS, Disp: 1 each, Rfl: 4  •  polyethylene glycol (MIRALAX) 17 GM/SCOOP powder, Mix 238g powder with 64 oz of clear liquid. Starting at 5pm drink 80z every 10-15 minutes until consumed., Disp: 238 g, Rfl: 0  •  polyethylene glycol (MIRALAX) 17 GM/SCOOP powder, Mix 238g powder with 64 oz of clear liquid. Starting at 5pm drink 80z every 10-15 minutes until consumed., Disp: 238 g, Rfl: 0  •  polyethylene glycol (MIRALAX)  17 GM/SCOOP powder, Mix 238g powder with 64 oz of clear liquid. Starting at 5pm drink 80z every 10-15 minutes until consumed., Disp: 238 g, Rfl: 0  •  propranolol (INDERAL) 1 MG/ML injection, propranolol, Disp: , Rfl:   •  propranolol (INDERAL) 10 MG tablet, , Disp: , Rfl: 0  •  propranolol (INDERAL) 20 MG tablet, take 1 tablet by mouth twice a day, Disp: , Rfl:   •  tiZANidine (ZANAFLEX) 4 MG tablet, Take 1 tablet by mouth 3 (Three) Times a Day., Disp: 90 tablet, Rfl: 0  •  amitriptyline (ELAVIL) 10 MG tablet, Take 1 tablet by mouth Every Night., Disp: 30 tablet, Rfl: 2  •  bisacodyl (bisacodyl) 5 MG EC tablet, Take 2 at 3pm and 2 at 7pm the day prior to colonoscopy., Disp: 4 tablet, Rfl: 0  •  bisacodyl (bisacodyl) 5 MG EC tablet, Take 2 at 3pm and 2 at 7pm the day prior to colonoscopy., Disp: 4 tablet, Rfl: 0  •  bisacodyl (bisacodyl) 5 MG EC tablet, Take 2 at 3pm and 2 at 7pm the day prior to colonoscopy., Disp: 4 tablet, Rfl: 0  •  cefdinir (OMNICEF) 300 MG capsule, , Disp: , Rfl: 0  •  cyclobenzaprine (FLEXERIL) 10 MG tablet, , Disp: , Rfl: 3  •  ibuprofen (ADVIL,MOTRIN) 600 MG tablet, take 1 tablet by mouth twice a day if needed, Disp: , Rfl: 0    Current Facility-Administered Medications:   •  bupivacaine (MARCAINE) injection 5 mL, 5 mL, Injection, Once, Yael Pires, APRN  •  methylPREDNISolone acetate (DEPO-medrol) injection 200 mg, 200 mg, Intramuscular, Once, Yael Pires, APRALIVIA  •  OnabotulinumtoxinA 155 Units, 155 Units, Intramuscular, Once, Yael Pires, APRALIVIA    Allergies  No Known Allergies    Social History  Social History     Socioeconomic History   • Marital status: Single   Tobacco Use   • Smoking status: Never Smoker   • Smokeless tobacco: Never Used   Vaping Use   • Vaping Use: Never used   Substance and Sexual Activity   • Alcohol use: No   • Drug use: No   • Sexual activity: Defer       Family History  He has + family history of prostate cancer in father, currently in 80s. Had  "prostatectomy  Family History   Problem Relation Age of Onset   • Cancer Father    • Hypertension Father    • High cholesterol Father    • Hypertension Brother        Review of Systems  A review of systems was notable for WLE of melanoma.    Physical Exam  Visit Vitals  /74   Pulse 94   Temp 97.2 °F (36.2 °C)   Ht 172.7 cm (68\")   Wt 81.6 kg (180 lb)   SpO2 99%   BMI 27.37 kg/m²     Physical exam notable for mild obesity    Genitourinary  deferred    Labs  No results found for: PSA    Brief Urine Lab Results     None        Lab Results   Component Value Date    HGBA1C 6.4 (H) 07/26/2018       PVR  deferred      Assessment  Mr. Echeverria is a 56 y.o. male who presents with LUTS, primarily urgency frequency, peyronies. Denies ED.    Plan  1.  Follow up for cystoscopy, TRUS, Uroflow, TAYLOR, GE, as well as penile injection of Trimix with curvature assessment  2. PSA and A1c today   3. Trial of flomax      Ed Ledezma MD    "

## 2022-01-27 NOTE — PROGRESS NOTES
January 27, 2022    Hello, may I speak with Sumit Echeverria?    My name is Beatriz Mauricio    I am  with [facility]  [address]  [address]  [facility] [address] [phone].    Before we get started may I verify your date of birth? 1965, correct    I am calling to officially welcome you to our practice and ask about your recent visit. Is this a good time to talk? Yes ma'am.    Tell me about your visit with us. What things went well? Things went well.       We're always looking for ways to make our patients' experiences even better. Do you have recommendations on ways we may improve?  no ma'am.    Overall were you satisfied with your first visit to our practice? Yes ma'am.       I appreciate you taking the time to speak with me today. Is there anything else I can do for you? No ma'am.      Thank you, and have a great day.

## 2022-02-11 ENCOUNTER — HOSPITAL ENCOUNTER (EMERGENCY)
Facility: HOSPITAL | Age: 57
Discharge: LEFT BEFORE DISCHARGE | End: 2022-02-11

## 2022-02-11 NOTE — ED NOTES
Armando Haney, registration informed nurse that patient had told her he was leaving. Nurse did not have the opportunity to speak with him before he left.       Kris Shelton RN  02/11/22 5754

## 2022-02-13 ENCOUNTER — APPOINTMENT (OUTPATIENT)
Dept: CT IMAGING | Facility: HOSPITAL | Age: 57
End: 2022-02-13
Payer: MEDICARE

## 2022-02-13 ENCOUNTER — HOSPITAL ENCOUNTER (EMERGENCY)
Facility: HOSPITAL | Age: 57
Discharge: HOME | End: 2022-02-13
Attending: EMERGENCY MEDICINE
Payer: MEDICARE

## 2022-02-13 VITALS
BODY MASS INDEX: 26.52 KG/M2 | OXYGEN SATURATION: 96 % | WEIGHT: 175 LBS | RESPIRATION RATE: 16 BRPM | SYSTOLIC BLOOD PRESSURE: 135 MMHG | HEART RATE: 96 BPM | DIASTOLIC BLOOD PRESSURE: 90 MMHG | TEMPERATURE: 98.4 F | HEIGHT: 68 IN

## 2022-02-13 DIAGNOSIS — G43.709 CHRONIC MIGRAINE WITHOUT AURA WITHOUT STATUS MIGRAINOSUS, NOT INTRACTABLE: Primary | ICD-10-CM

## 2022-02-13 PROCEDURE — 70450 CT HEAD/BRAIN W/O DYE: CPT

## 2022-02-13 PROCEDURE — 99282 EMERGENCY DEPT VISIT SF MDM: CPT

## 2022-02-13 PROCEDURE — 96375 TX/PRO/DX INJ NEW DRUG ADDON: CPT

## 2022-02-13 PROCEDURE — 96374 THER/PROPH/DIAG INJ IV PUSH: CPT

## 2022-02-13 PROCEDURE — 6360000002 HC RX W HCPCS: Performed by: EMERGENCY MEDICINE

## 2022-02-13 RX ORDER — KETOROLAC TROMETHAMINE 30 MG/ML
15 INJECTION, SOLUTION INTRAMUSCULAR; INTRAVENOUS ONCE
Status: COMPLETED | OUTPATIENT
Start: 2022-02-13 | End: 2022-02-13

## 2022-02-13 RX ORDER — METOCLOPRAMIDE HYDROCHLORIDE 5 MG/ML
10 INJECTION INTRAMUSCULAR; INTRAVENOUS ONCE
Status: COMPLETED | OUTPATIENT
Start: 2022-02-13 | End: 2022-02-13

## 2022-02-13 RX ORDER — PROCHLORPERAZINE EDISYLATE 5 MG/ML
10 INJECTION INTRAMUSCULAR; INTRAVENOUS ONCE
Status: COMPLETED | OUTPATIENT
Start: 2022-02-13 | End: 2022-02-13

## 2022-02-13 RX ADMIN — METOCLOPRAMIDE 10 MG: 5 INJECTION, SOLUTION INTRAMUSCULAR; INTRAVENOUS at 23:00

## 2022-02-13 RX ADMIN — PROCHLORPERAZINE EDISYLATE 10 MG: 5 INJECTION INTRAMUSCULAR; INTRAVENOUS at 22:59

## 2022-02-13 RX ADMIN — KETOROLAC TROMETHAMINE 15 MG: 30 INJECTION, SOLUTION INTRAMUSCULAR at 22:59

## 2022-02-13 ASSESSMENT — PAIN SCALES - GENERAL
PAINLEVEL_OUTOF10: 8
PAINLEVEL_OUTOF10: 8
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
## 2022-02-21 ENCOUNTER — HOSPITAL ENCOUNTER (OUTPATIENT)
Dept: GENERAL RADIOLOGY | Facility: HOSPITAL | Age: 57
Discharge: HOME | End: 2022-02-21
Payer: MEDICARE

## 2022-02-21 ENCOUNTER — HOSPITAL ENCOUNTER (OUTPATIENT)
Facility: HOSPITAL | Age: 57
Discharge: HOME | End: 2022-02-21
Payer: MEDICARE

## 2022-02-21 DIAGNOSIS — M43.06 ACQUIRED SPONDYLOLYSIS OF LUMBAR SPINE: ICD-10-CM

## 2022-02-21 DIAGNOSIS — M47.812 CERVICAL SPONDYLOSIS WITHOUT MYELOPATHY: ICD-10-CM

## 2022-02-21 PROCEDURE — 72100 X-RAY EXAM L-S SPINE 2/3 VWS: CPT

## 2022-02-21 PROCEDURE — 72040 X-RAY EXAM NECK SPINE 2-3 VW: CPT

## 2022-02-28 ENCOUNTER — OFFICE VISIT (OUTPATIENT)
Dept: UROLOGY | Facility: CLINIC | Age: 57
End: 2022-02-28

## 2022-02-28 DIAGNOSIS — R39.9 LOWER URINARY TRACT SYMPTOMS (LUTS): Primary | ICD-10-CM

## 2022-02-28 PROCEDURE — 51741 ELECTRO-UROFLOWMETRY FIRST: CPT | Performed by: UROLOGY

## 2022-02-28 PROCEDURE — 52000 CYSTOURETHROSCOPY: CPT | Performed by: UROLOGY
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
## 2022-03-01 DIAGNOSIS — R35.1 NOCTURIA: ICD-10-CM

## 2022-03-01 LAB
HBA1C MFR BLD: 6.6 % (ref 4.8–5.6)
PSA SERPL-MCNC: 1.14 NG/ML (ref 0–4)

## 2022-03-01 RX ORDER — TAMSULOSIN HYDROCHLORIDE 0.4 MG/1
1 CAPSULE ORAL NIGHTLY
Qty: 30 CAPSULE | Refills: 0 | OUTPATIENT
Start: 2022-03-01

## 2022-03-17 ENCOUNTER — TELEPHONE (OUTPATIENT)
Dept: UROLOGY | Facility: CLINIC | Age: 57
End: 2022-03-17

## 2022-03-17 NOTE — TELEPHONE ENCOUNTER
Caller: NATALIE REIS    Relationship to patient: SELF    Best call back number: [phone]    Patient is needing: PT CONFIRMED APPT 4/15/22, HAS MEDICATION STORED IN FREEZER & LIVES 30 MINS AWAY, NEEDS CALL BACK TO CONFIRM IF SHOULD BRING MEDICATION IN A COOLER TO HIS APPT. HUB UNABLE TO WARM TRANSFER. PT STATES OK TO LEAVE DETAILED MESSAGE ON ANSWERING MACHINE.

## 2022-04-26 ENCOUNTER — HOSPITAL ENCOUNTER (EMERGENCY)
Facility: HOSPITAL | Age: 57
Discharge: HOME | End: 2022-04-26
Attending: EMERGENCY MEDICINE
Payer: MEDICARE

## 2022-04-26 ENCOUNTER — APPOINTMENT (OUTPATIENT)
Dept: CT IMAGING | Facility: HOSPITAL | Age: 57
End: 2022-04-26
Payer: MEDICARE

## 2022-04-26 ENCOUNTER — TELEPHONE (OUTPATIENT)
Dept: UROLOGY | Facility: CLINIC | Age: 57
End: 2022-04-26

## 2022-04-26 VITALS
OXYGEN SATURATION: 93 % | WEIGHT: 180 LBS | RESPIRATION RATE: 16 BRPM | BODY MASS INDEX: 27.28 KG/M2 | SYSTOLIC BLOOD PRESSURE: 116 MMHG | HEART RATE: 112 BPM | DIASTOLIC BLOOD PRESSURE: 78 MMHG | HEIGHT: 68 IN | TEMPERATURE: 98.6 F

## 2022-04-26 DIAGNOSIS — G43.909 MIGRAINE WITHOUT STATUS MIGRAINOSUS, NOT INTRACTABLE, UNSPECIFIED MIGRAINE TYPE: Primary | ICD-10-CM

## 2022-04-26 PROCEDURE — 96374 THER/PROPH/DIAG INJ IV PUSH: CPT

## 2022-04-26 PROCEDURE — 96375 TX/PRO/DX INJ NEW DRUG ADDON: CPT

## 2022-04-26 PROCEDURE — 70450 CT HEAD/BRAIN W/O DYE: CPT

## 2022-04-26 PROCEDURE — 99284 EMERGENCY DEPT VISIT MOD MDM: CPT

## 2022-04-26 PROCEDURE — 6360000002 HC RX W HCPCS: Performed by: EMERGENCY MEDICINE

## 2022-04-26 PROCEDURE — 2580000003 HC RX 258: Performed by: EMERGENCY MEDICINE

## 2022-04-26 RX ORDER — METOCLOPRAMIDE HYDROCHLORIDE 5 MG/ML
10 INJECTION INTRAMUSCULAR; INTRAVENOUS ONCE
Status: COMPLETED | OUTPATIENT
Start: 2022-04-26 | End: 2022-04-26

## 2022-04-26 RX ORDER — DIPHENHYDRAMINE HYDROCHLORIDE 50 MG/ML
50 INJECTION INTRAMUSCULAR; INTRAVENOUS ONCE
Status: COMPLETED | OUTPATIENT
Start: 2022-04-26 | End: 2022-04-26

## 2022-04-26 RX ORDER — 0.9 % SODIUM CHLORIDE 0.9 %
1000 INTRAVENOUS SOLUTION INTRAVENOUS ONCE
Status: COMPLETED | OUTPATIENT
Start: 2022-04-26 | End: 2022-04-26

## 2022-04-26 RX ORDER — KETOROLAC TROMETHAMINE 30 MG/ML
30 INJECTION, SOLUTION INTRAMUSCULAR; INTRAVENOUS ONCE
Status: COMPLETED | OUTPATIENT
Start: 2022-04-26 | End: 2022-04-26

## 2022-04-26 RX ORDER — OXYCODONE AND ACETAMINOPHEN 7.5; 325 MG/1; MG/1
1 TABLET ORAL 3 TIMES DAILY
COMMUNITY

## 2022-04-26 RX ORDER — DEXAMETHASONE SODIUM PHOSPHATE 10 MG/ML
10 INJECTION INTRAMUSCULAR; INTRAVENOUS ONCE
Status: COMPLETED | OUTPATIENT
Start: 2022-04-26 | End: 2022-04-26

## 2022-04-26 RX ADMIN — SODIUM CHLORIDE 1000 ML: 9 INJECTION, SOLUTION INTRAVENOUS at 22:20

## 2022-04-26 RX ADMIN — DIPHENHYDRAMINE HYDROCHLORIDE 50 MG: 50 INJECTION, SOLUTION INTRAMUSCULAR; INTRAVENOUS at 22:12

## 2022-04-26 RX ADMIN — METOCLOPRAMIDE 10 MG: 5 INJECTION, SOLUTION INTRAMUSCULAR; INTRAVENOUS at 22:13

## 2022-04-26 RX ADMIN — DEXAMETHASONE SODIUM PHOSPHATE 10 MG: 10 INJECTION INTRAMUSCULAR; INTRAVENOUS at 22:13

## 2022-04-26 RX ADMIN — KETOROLAC TROMETHAMINE 30 MG: 30 INJECTION, SOLUTION INTRAMUSCULAR at 22:12

## 2022-04-26 ASSESSMENT — PAIN DESCRIPTION - LOCATION: LOCATION: HEAD;OTHER (COMMENT)

## 2022-04-26 ASSESSMENT — PAIN DESCRIPTION - PAIN TYPE: TYPE: ACUTE PAIN

## 2022-04-26 ASSESSMENT — PAIN DESCRIPTION - DESCRIPTORS: DESCRIPTORS: ACHING;POUNDING

## 2022-04-26 ASSESSMENT — PAIN - FUNCTIONAL ASSESSMENT: PAIN_FUNCTIONAL_ASSESSMENT: 0-10

## 2022-04-26 ASSESSMENT — PAIN DESCRIPTION - FREQUENCY: FREQUENCY: CONTINUOUS

## 2022-04-26 ASSESSMENT — PAIN SCALES - GENERAL
PAINLEVEL_OUTOF10: 0
PAINLEVEL_OUTOF10: 8

## 2022-04-26 NOTE — TELEPHONE ENCOUNTER
Provider: DR. ORONA  Caller: NATALIE REIS  Relationship to Patient: SELF  Pharmacy: [facility] IN [place]  Phone Number: [phone]    Reason for Call: IS PATIENT STILL SUPPOSED TO BE TAKING MEDICATIONS PRESCRIBED. HAS RUN OUT OF ALL OF THEM EXCEPT THE ONE THAT GOES IN THE FREEZER. DOESN'T REMEMBER THE NAME OF THEM.     PATIENT REQUESTING A CALL BACK & USES [facility] [place]

## 2022-04-26 NOTE — TELEPHONE ENCOUNTER
DELETE AFTER REVIEWING: Telephone encounter to be sent to the clinical pool   Hub staff attempted to follow warm transfer process and was unsuccessful     Caller: Sumit Echeverria A    Relationship to patient: Self    Best call back number: [phone]    Patient is needing: PT MISSED A CALL FROM OFFICE, NO MESSAGE WAS LEFT. HE WAS RETURNING THE CALL BUT HUB UNABLE TO TRANSFER THE CALL. PLEASE CALL PT BACK AGAIN.

## 2022-04-27 NOTE — ED PROVIDER NOTES
[address] [place] [address] Court  eMERGENCY dEPARTMENT eNCOUnter      Pt Name: Amanda Chavez  MRN: [id number]  [place]: 1965  Date ofevaluation: 3/09/1434  Provider: Rola Abernathy MD    [address] [place] [address]       Chief Complaint   Patient presents with    Headache    Facial Pain     sinus pressure on right side         HISTORY OF PRESENT ILLNESS  (Location/Symptom, Timing/Onset, Context/Setting, Quality, Duration, Modifying Factors, Severity.)   Amanda Chavez is a 61 y.o. male who presents to the emergency department with headache that started about 2 hours ago. Pain is to his frontal head (points to frontal sinus area) and is \"sharp\" in nature. Pain has been constant since it started. + photophobia  + phonophobia. He's had migraine headaches \"for years\" but this is not the same. He has some nausea but no vomiting. He thinks there is swelling over his right sphenoid sinus now. He thinks his vision is \"blurry\" in the right eye. He saw ENT at [facility]. Dr. Libby Yang. 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 He had endoscopic sinus surgery to drain this on 9-19-21. He did not have any improvement of his headaches. He has not seen ENT since then. He has also been seen by Dr. Miguel Gale, neurology at [facility]. She confirmed his diagnosis of migraines. He has been getting Emgality injections once a month with slight relief. He used to have Botox injections for his headaches (x 28) but this did not help him at all. Nursing notes were reviewed.     REVIEW OF SYSTEMS    (2-9systems for level 4, 10 or more for level 5)   ROS:  General:  No fevers, no chills, no weakness  HEENT: No sore throat, runny nose or ear pain  Cardiovascular:  No chest pain, no palpitations  Respiratory:  No shortness of breath, no cough, no wheezing  Gastrointestinal:  No pain, no nausea, no vomiting, no diarrhea  Musculoskeletal:  No muscle pain, no joint pain  Skin: No rash, no easy bruising  Genitourinary:  No dysuria, no hematuria  + headache  + right swelling to face with \"eye\" pressure. Except as noted above theremainder of the review of systems was reviewed and negative. PASTMEDICAL HISTORY     Past Medical History:   Diagnosis Date    Asthma     Cancer ([facility] [address]     melanoma on back    Chronic back pain     Depression     Elevated liver enzymes     Erectile dysfunction     Headache     Hyperlipidemia     Hypertension     Hypogonadism     Melanoma ([facility] [address]     Memory loss     S/P Botox injection     neck x12, face x6    Type II or unspecified type diabetes mellitus without mention of complication, not stated as uncontrolled          SURGICAL HISTORY       Past Surgical History:   Procedure Laterality Date    COLONOSCOPY N/A 10/29/2020    COLORECTAL CANCER SCREENING, NOT HIGH RISK performed by Iveth Marshall MD at [address] [place] [address]    TONSILLECTOMY      UPPER GASTROINTESTINAL ENDOSCOPY N/A 10/1/2020    EGD BIOPSY performed by Iveth Marshall MD at [address] [place] [address]       Previous Medications    ASPIRIN 81 MG EC TABLET    Take 81 mg by mouth daily. BUPROPION (WELLBUTRIN) 75 MG TABLET    Take 1 tablet by mouth 2 times daily. CYCLOBENZAPRINE (FLEXERIL) 10 MG TABLET    Take 10 mg by mouth 3 times daily as needed for Muscle spasms    DULOXETINE (CYMBALTA) 60 MG CAPSULE    Take 1 capsule by mouth daily. GLUCOSE BLOOD VI TEST STRIPS (JALIL CONTOUR NEXT TEST) STRIP    1 each by Does not apply route daily. As needed. METFORMIN (GLUCOPHAGE) 1000 MG TABLET    Take 1 tablet by mouth 2 times daily (with meals). OXYCODONE-ACETAMINOPHEN (PERCOCET) 7.5-325 MG PER TABLET    Take 1 tablet by mouth in the morning, at noon, and at bedtime.     PROPRANOLOL (INDERAL) 20 MG TABLET    take 1 tablet by mouth twice a day    SUMATRIPTAN (IMITREX) 50 MG TABLET    Take 50 mg by mouth once as needed for Migraine ALLERGIES     Patient has no known allergies. FAMILY HISTORY       Family History   Problem Relation Age of Onset    Cancer Father     High Blood Pressure Father           SOCIAL HISTORY       Social History     Socioeconomic History    Marital status: Single     Spouse name: None    Number of children: None    Years of education: None    Highest education level: None   Occupational History    None   Tobacco Use    Smoking status: Never Smoker    Smokeless tobacco: Never Used   Vaping Use    Vaping Use: Never used   Substance and Sexual Activity    Alcohol use: No     Comment: quit 5 years ago    Drug use: No    Sexual activity: None   Other Topics Concern    None   Social History Narrative    None     Social Determinants of Health     Financial Resource Strain:     Difficulty of Paying Living Expenses: Not on file   Food Insecurity:     Worried About Running Out of Food in the Last Year: Not on file    Gene of Food in the Last Year: Not on file   Transportation Needs:     Lack of Transportation (Medical): Not on file    Lack of Transportation (Non-Medical):  Not on file   Physical Activity:     Days of Exercise per Week: Not on file    Minutes of Exercise per Session: Not on file   Stress:     Feeling of Stress : Not on file   Social Connections:     Frequency of Communication with Friends and Family: Not on file    Frequency of Social Gatherings with Friends and Family: Not on file    Attends Sabianist Services: Not on file    Active Member of Clubs or Organizations: Not on file    Attends Club or Organization Meetings: Not on file    Marital Status: Not on file   Intimate Partner Violence:     Fear of Current or Ex-Partner: Not on file    Emotionally Abused: Not on file    Physically Abused: Not on file    Sexually Abused: Not on file   Housing Stability:     Unable to Pay for Housing in the Last Year: Not on file    Number of [place] in the Last Year: Not on file    Unstable Housing in the Last Year: Not on file         PHYSICAL EXAM    (up to 7 forlevel 4, 8 or more for level 5)     ED Triage Vitals   BP Temp Temp src Pulse Resp SpO2 Height Weight   -- -- -- -- -- -- -- --       Physical Exam  General :Patient is awake, alert, oriented, in no acute distress, nontoxic appearing  HEENT: Pupils are equally round and reactive to light, EOMI. Cardiac: Heart regular rate, rhythm, no murmurs, rubs, or gallops  Lungs: Lungs are clear to auscultation, there is no wheezing, rhonchi, or rales. Abdomen: Abdomen is soft, nontender, nondistended. Musculoskeletal: Ambulatory  Back: No midline or bony tenderness. Dermatology: Skin is warm and dry  Psych: Mentation is grossly normal, cognition is grossly normal. Affect is appropriate. DIAGNOSTIC RESULTS       RADIOLOGY:   Non-plain film images such as CT, Ultrasoundand MRI are read by the radiologist. Plain radiographic images are visualized and preliminarily interpreted by the emergency physician with the below findings:      [] Radiologist's Report Reviewed:  CT HEAD WO CONTRAST   Final Result      Normal.            ED BEDSIDE ULTRASOUND:   Performed by ED Physician - none    LABS:  Labs Reviewed - No data to display    I have reviewed and interpreted all of the currently available lab resultsfrom this visit (if applicable):  No results found for this visit on 04/26/22. All other labs were within normal range or not returned as of this dictation. EMERGENCY DEPARTMENT COURSE and DIFFERENTIAL DIAGNOSIS/MDM:   Vitals:    Vitals:    04/26/22 2120 04/26/22 2121   BP:  (!) 149/93   Pulse:  112   Resp:  16   Temp: 98.6 °F (37 °C) 98.6 °F (37 °C)   TempSrc: Oral Oral   SpO2:  97%   Weight:  180 lb (81.6 kg)   Height:  5' 8\" (1.727 m)       CT of the head was normal.  There was no sign of fluid collection or problem in the sinus as the patient was concerned about.     2250  Patient's headache significantly improved and he was sleeping in the emergency department. He said he would like to go home. The patient will follow-up with their PCP in 1-2 days for reevaluation. If the patient or family members have any further concerns or any worsening symptoms they will return to the ED for reevaluation. CONSULTS:  None    PROCEDURES:  Procedures    CRITICAL CARE TIME    Total Critical Care time was 0 minutes, excluding separately reportable procedures. There was a high probability of clinically significant/life threatening deterioration in the patient's condition which required my urgent intervention. FINAL IMPRESSION      1. Migraine without status migrainosus, not intractable, unspecified migraine type          DISPOSITION/PLAN   DISPOSITION Decision To Discharge 04/26/2022 10:49:47 PM      PATIENT REFERRED TO:  April 10 [address] [address] [phone]    Schedule an appointment as soon as possible for a visit in 2 days  As needed      DISCHARGE MEDICATIONS:  New Prescriptions    No medications on file       Comment: Please note this report has been produced using speech recognition software and may contain errors related tothat system including errors in grammar, punctuation, and spelling, as well as words and phrases that may be inappropriate. If there are any questions or concerns please feel free to contact the dictating provider forclarification.     Ema Castro MD  Attending Emergency Physician                  Ema Castro MD  04/26/22 [id number]

## 2022-05-09 ENCOUNTER — OFFICE VISIT (OUTPATIENT)
Dept: UROLOGY | Facility: CLINIC | Age: 57
End: 2022-05-09

## 2022-05-09 VITALS
BODY MASS INDEX: 27.28 KG/M2 | WEIGHT: 180 LBS | TEMPERATURE: 97.3 F | OXYGEN SATURATION: 95 % | DIASTOLIC BLOOD PRESSURE: 90 MMHG | RESPIRATION RATE: 19 BRPM | SYSTOLIC BLOOD PRESSURE: 128 MMHG | HEART RATE: 85 BPM | HEIGHT: 68 IN

## 2022-05-09 DIAGNOSIS — N48.6 PEYRONIE'S DISEASE: Primary | ICD-10-CM

## 2022-05-09 DIAGNOSIS — G47.33 OSA (OBSTRUCTIVE SLEEP APNEA): ICD-10-CM

## 2022-05-09 DIAGNOSIS — E29.1 HYPOGONADISM IN MALE: ICD-10-CM

## 2022-05-09 PROCEDURE — 99214 OFFICE O/P EST MOD 30 MIN: CPT | Performed by: UROLOGY

## 2022-05-09 RX ORDER — FLUTICASONE PROPIONATE AND SALMETEROL 250; 50 UG/1; UG/1
1 POWDER RESPIRATORY (INHALATION) 2 TIMES DAILY
COMMUNITY
Start: 2022-04-29

## 2022-05-09 RX ORDER — ROSUVASTATIN CALCIUM 20 MG/1
20 TABLET, COATED ORAL EVERY EVENING
COMMUNITY
Start: 2022-02-18

## 2022-05-09 RX ORDER — ALBUTEROL SULFATE 90 UG/1
AEROSOL, METERED RESPIRATORY (INHALATION)
COMMUNITY

## 2022-05-09 RX ORDER — BUTALBITAL, ACETAMINOPHEN AND CAFFEINE 50; 325; 40 MG/1; MG/1; MG/1
TABLET ORAL
COMMUNITY

## 2022-05-09 RX ORDER — EVOLOCUMAB 140 MG/ML
INJECTION, SOLUTION SUBCUTANEOUS
COMMUNITY

## 2022-05-09 RX ORDER — ORAL SEMAGLUTIDE 7 MG/1
TABLET ORAL
COMMUNITY
Start: 2022-04-29

## 2022-05-09 RX ORDER — SUMATRIPTAN 100 MG/1
TABLET, FILM COATED ORAL
COMMUNITY
Start: 2022-05-02

## 2022-05-09 NOTE — PROGRESS NOTES
Chief Complaint   Patient presents with   • Follow-up     ICI teaching        HPI  Ms. Echeverria is a 56 y.o. male with history below in assessment, who presents for follow up.     At this visit c/o snoring, poor sleep, fatigue, sometimes decreased libido.     Past Medical History:   Diagnosis Date   • Anxiety    • Cancer (HCC)    • Depression    • Diabetes mellitus (HCC)    • Hyperlipidemia    • Hypertension    • Migraine        Past Surgical History:   Procedure Laterality Date   • AXILLARY LYMPH NODE BIOPSY/EXCISION     • BRAIN SURGERY     • SKIN CANCER EXCISION  2014         Current Outpatient Medications:   •  amitriptyline (ELAVIL) 10 MG tablet, Take 1 tablet by mouth Every Night., Disp: 30 tablet, Rfl: 2  •  aspirin 81 MG EC tablet, Take  by mouth., Disp: , Rfl:   •  bisacodyl (bisacodyl) 5 MG EC tablet, Take 2 at 3pm and 2 at 7pm the day prior to colonoscopy., Disp: 4 tablet, Rfl: 0  •  bisacodyl (bisacodyl) 5 MG EC tablet, Take 2 at 3pm and 2 at 7pm the day prior to colonoscopy., Disp: 4 tablet, Rfl: 0  •  bisacodyl (bisacodyl) 5 MG EC tablet, Take 2 at 3pm and 2 at 7pm the day prior to colonoscopy., Disp: 4 tablet, Rfl: 0  •  buPROPion (WELLBUTRIN) 75 MG tablet, Take  by mouth. Take one tablet by mouth twice daily, Disp: , Rfl:   •  buPROPion SR (WELLBUTRIN SR) 100 MG 12 hr tablet, Wellbutrin SR, Disp: , Rfl:   •  cefdinir (OMNICEF) 300 MG capsule, , Disp: , Rfl: 0  •  cyclobenzaprine (FLEXERIL) 10 MG tablet, , Disp: , Rfl: 3  •  DULoxetine (CYMBALTA) 20 MG capsule, Cymbalta, Disp: , Rfl:   •  DULoxetine (CYMBALTA) 60 MG capsule, Take  by mouth. Take one capsule by mouth daily, Disp: , Rfl:   •  glucose blood test strip, , Disp: , Rfl:   •  glucose blood test strip, OneTouch Ultra Blue Test Strip, Disp: , Rfl:   •  HYDROcodone-acetaminophen (NORCO)  MG per tablet, Take  by mouth., Disp: , Rfl:   •  ibuprofen (ADVIL,MOTRIN) 600 MG tablet, take 1 tablet by mouth twice a day if needed, Disp: , Rfl: 0  •   METFORMIN & DIET MANAGE PROD PO, metformin, Disp: , Rfl:   •  metFORMIN (GLUCOPHAGE) 1000 MG tablet, Take  by mouth. Take one tablet by mouth twice daily, Disp: , Rfl:   •  MethylPREDNISolone (MEDROL, JUDITH,) 4 MG tablet, , Disp: , Rfl: 0  •  Mirabegron ER (Myrbetriq) 25 MG tablet sustained-release 24 hour 24 hr tablet, Take 1 tablet by mouth Daily., Disp: 30 tablet, Rfl: 11  •  OnabotulinumtoxinA 200 units reconstituted solution, INJECT 155 UNITS IM TO HEAD AND NECK AREA EVERY 12 WEEKS, Disp: 1 each, Rfl: 4  •  polyethylene glycol (MIRALAX) 17 GM/SCOOP powder, Mix 238g powder with 64 oz of clear liquid. Starting at 5pm drink 80z every 10-15 minutes until consumed., Disp: 238 g, Rfl: 0  •  polyethylene glycol (MIRALAX) 17 GM/SCOOP powder, Mix 238g powder with 64 oz of clear liquid. Starting at 5pm drink 80z every 10-15 minutes until consumed., Disp: 238 g, Rfl: 0  •  polyethylene glycol (MIRALAX) 17 GM/SCOOP powder, Mix 238g powder with 64 oz of clear liquid. Starting at 5pm drink 80z every 10-15 minutes until consumed., Disp: 238 g, Rfl: 0  •  propranolol (INDERAL) 1 MG/ML injection, propranolol, Disp: , Rfl:   •  propranolol (INDERAL) 10 MG tablet, , Disp: , Rfl: 0  •  propranolol (INDERAL) 20 MG tablet, take 1 tablet by mouth twice a day, Disp: , Rfl:   •  tiZANidine (ZANAFLEX) 4 MG tablet, Take 1 tablet by mouth 3 (Three) Times a Day., Disp: 90 tablet, Rfl: 0  •  albuterol sulfate  (90 Base) MCG/ACT inhaler, albuterol sulfate HFA 90 mcg/actuation aerosol inhaler  INHALE 1 PUFF BY MOUTH EVERY 4 HOURS AS NEEDED, Disp: , Rfl:   •  butalbital-acetaminophen-caffeine (FIORICET, ESGIC) -40 MG per tablet, butalbital-acetaminophen-caffeine 50 mg-325 mg-40 mg tablet  TK 1 T PO Q 4 H PRF HEADACHES, Disp: , Rfl:   •  Evolocumab (Repatha SureClick) solution auto-injector SureClick injection, Repatha SureClick 140 mg/mL subcutaneous pen injector, Disp: , Rfl:   •  Fluticasone-Salmeterol (ADVAIR) 250-50 MCG/ACT  "DISKUS, Inhale 1 puff 2 (Two) Times a Day., Disp: , Rfl:   •  rosuvastatin (CRESTOR) 20 MG tablet, Take 20 mg by mouth Every Evening., Disp: , Rfl:   •  Rybelsus 7 MG tablet, TAKE 1 TABLET BY MOUTH EVERY DAY - START AFTER 30 DAYS OF 3MG SAMPLES, Disp: , Rfl:   •  SUMAtriptan (IMITREX) 100 MG tablet, , Disp: , Rfl:     Current Facility-Administered Medications:   •  bupivacaine (MARCAINE) injection 5 mL, 5 mL, Injection, Once, Yael Pires, APRALIVIA  •  methylPREDNISolone acetate (DEPO-medrol) injection 200 mg, 200 mg, Intramuscular, Once, Yael Pires, APRN  •  OnabotulinumtoxinA 155 Units, 155 Units, Intramuscular, Once, Yael Pires, APRN     Physical Exam  Visit Vitals  /90   Pulse 85   Temp 97.3 °F (36.3 °C)   Resp 19   Ht 172.7 cm (67.99\")   Wt 81.6 kg (180 lb)   SpO2 95%   BMI 27.38 kg/m²       Labs  Brief Urine Lab Results     None          No results found for: GLUCOSE, CALCIUM, NA, K, CO2, CL, BUN, CREATININE, EGFRIFAFRI, EGFRIFNONA, BCR, ANIONGAP    Lab Results   Component Value Date    WBC 13.3 (H) 11/04/2020    HGB 11.1 (L) 11/04/2020    HCT 35.3 (L) 11/04/2020    MCV 91.0 11/04/2020     11/04/2020            Lab Results   Component Value Date    PSA 1.140 02/28/2022     Lab Results   Component Value Date    HGBA1C 6.60 (H) 02/28/2022           Radiographic Studies  CT HEAD WO CONTRAST  Result Date: 4/26/2022  Normal.    XR LUMBAR SPINE (2-3 VIEWS)  Result Date: 2/21/2022  3 views demonstrate 5 lumbar type vertebral bodies in anatomic alignment. Old compression fracture of L1 with wedging is without change since 5/27/2020. There is mild at L3-4 degenerative disc disease with hypertrophic spurring. There is mild facet hypertrophy at L5-S1.    XR CERVICAL SPINE (2-3 VIEWS)  Result Date: 2/21/2022  1.  Rightward tilt of the head, similar to the prior exam with mild spondylosis in the mid lower thoracic spine, unchanged.  No findings for acute cervical spine abnormality.    CT Head WO " Contrast  Result Date: 2/13/2022  No acute intracranial hemorrhage or mass effect. If there is concern for right frontal mass, MRI with contrast is recommended.        I have reviewed above labs and imaging.     Assessment  56 y.o. male with Peyronies, reported Hx of hypogonadism, at risk for DARI.     In office today he was injected with 0.2 mL of Trimix, and had an 80% erection.  This did demonstrate left lateral curvature, with the point of maximal curvature being on the right side 3 fingerbreadths from the corona.  The patient is able to penetrate and is not extremely bothered by his curvature.  He was mostly concerned for causing worsening of it with usage.  I reassured him.  He also reports a history of hypogonadism and receives testosterone injections in the past.  He is mildly bothered by this at present, admits to poor sleep and snoring, and has never been tested for sleep apnea.  We reviewed the risks and benefits of testosterone replacement therapy, and after careful discussion he wants to hold off on further testing for hypogonadism or treatment.  I recommended he focus on DARI testing and treatment.    Plan  1. FU PRN  2.  Referral to pulmonology for DARI testing.    I spent a total of 30 minutes with the patient and the chart engaging in data gathering and interpretation, patient interaction, as well as counseling on the risks, benefits, and alternatives of the therapy and coordinating care.

## 2022-10-17 ENCOUNTER — HOSPITAL ENCOUNTER (EMERGENCY)
Facility: HOSPITAL | Age: 57
Discharge: HOME | End: 2022-10-17
Attending: HOSPITALIST
Payer: MEDICARE

## 2022-10-17 VITALS
OXYGEN SATURATION: 93 % | HEART RATE: 96 BPM | SYSTOLIC BLOOD PRESSURE: 103 MMHG | TEMPERATURE: 98.2 F | DIASTOLIC BLOOD PRESSURE: 78 MMHG

## 2022-10-17 DIAGNOSIS — M54.50 ACUTE EXACERBATION OF CHRONIC LOW BACK PAIN: Primary | ICD-10-CM

## 2022-10-17 DIAGNOSIS — G89.29 ACUTE EXACERBATION OF CHRONIC LOW BACK PAIN: Primary | ICD-10-CM

## 2022-10-17 DIAGNOSIS — M53.3 SACROILIAC JOINT PAIN: ICD-10-CM

## 2022-10-17 PROCEDURE — 99284 EMERGENCY DEPT VISIT MOD MDM: CPT

## 2022-10-17 PROCEDURE — 96372 THER/PROPH/DIAG INJ SC/IM: CPT

## 2022-10-17 PROCEDURE — 6360000002 HC RX W HCPCS: Performed by: HOSPITALIST

## 2022-10-17 RX ADMIN — HYDROMORPHONE HYDROCHLORIDE 1 MG: 1 INJECTION, SOLUTION INTRAMUSCULAR; INTRAVENOUS; SUBCUTANEOUS at 11:04

## 2022-10-17 ASSESSMENT — LIFESTYLE VARIABLES: HOW OFTEN DO YOU HAVE A DRINK CONTAINING ALCOHOL: NEVER

## 2022-10-17 ASSESSMENT — PAIN - FUNCTIONAL ASSESSMENT: PAIN_FUNCTIONAL_ASSESSMENT: 0-10

## 2022-10-17 ASSESSMENT — PAIN SCALES - GENERAL: PAINLEVEL_OUTOF10: 10

## 2022-10-17 NOTE — ED PROVIDER NOTES
[address] [facility] [address] ENCOUNTER      Pt Name: Violeta Mclaughlin  MRN: [id number]  YOB: 1965  Date of evaluation: 10/17/2022  Provider: Avril Sullivan DO    CHIEF COMPLAINT       Chief Complaint   Patient presents with    Back Pain         HISTORY OF PRESENT ILLNESS  (Location/Symptom, Timing/Onset, Context/Setting, Quality, Duration, Modifying Factors, Severity.)   Violeta Mclaughlin is a 61 y.o. male who presents to the emergency department for acute on chronic low back pain. Patient states that he injured his back about 25 years ago and has had chronic back pain ever since then. Patient states it was bothering him for the past couple of days and then thought it was going to ease off last night. Patient states that he has had company at home so he is actually been sleeping on the couch and letting his family sleep in his bed he thinks may be sleeping on the couch caused him to have acute exacerbation today of his chronic low back pain. He denies any trauma or injury to the back. Denies any loss of bowel or bladder control. Denies any numbness tingling weakness of extremities out of ordinary. Patient states that today is pry the worst that his back is felt in the 25 years it has been on and off during that time. Patient is well-known to the emergency department here for his chronic headaches. Patient is on chronic pain medication oxycodone 10/325. Patient states he does have a ride. Advises that the chronic pain medication that he is been taking at home is really not helped with his pain. When asked where his pain was the most actually points to the left SI area. Patient rates his pain as a 10 out of 10. Nursing notes were reviewed.     REVIEW OFSYSTEMS    (2-9 systems for level 4, 10 or more for level 5)   ROS:  General:  No fevers, no chills, no weakness  Cardiovascular:  No chest pain, no palpitations  Respiratory:  No shortness of breath, no cough, no wheezing  Gastrointestinal:  No pain, no nausea, no vomiting, no diarrhea  Musculoskeletal:  No muscle pain, no joint pain, +acute on chronic low back pain  Skin:  No rash, no easy bruising  Neurologic:  No speech problems, no headache, no extremity weakness  Psychiatric:  No anxiety  Genitourinary:  No dysuria, no hematuria    Except as noted above the remainder of the review of systems was reviewed and negative. PAST MEDICAL HISTORY     Past Medical History:   Diagnosis Date    Asthma     Cancer ([facility] [address]     melanoma on back    Chronic back pain     Depression     Elevated liver enzymes     Erectile dysfunction     Headache     Hyperlipidemia     Hypertension     Hypogonadism     Melanoma ([facility] [address]     Memory loss     S/P Botox injection     neck x12, face x6    Type II or unspecified type diabetes mellitus without mention of complication, not stated as uncontrolled          SURGICAL HISTORY       Past Surgical History:   Procedure Laterality Date    COLONOSCOPY N/A 10/29/2020    COLORECTAL CANCER SCREENING, NOT HIGH RISK performed by Emre Barclay MD at [address] [place] [address]    TONSILLECTOMY      UPPER GASTROINTESTINAL ENDOSCOPY N/A 10/1/2020    EGD BIOPSY performed by Emer Barclay MD at [address] [place] [address]       Previous Medications    ASPIRIN 81 MG EC TABLET    Take 81 mg by mouth daily. BUPROPION (WELLBUTRIN) 75 MG TABLET    Take 1 tablet by mouth 2 times daily. CYCLOBENZAPRINE (FLEXERIL) 10 MG TABLET    Take 10 mg by mouth 3 times daily as needed for Muscle spasms    DULOXETINE (CYMBALTA) 60 MG CAPSULE    Take 1 capsule by mouth daily. GLUCOSE BLOOD VI TEST STRIPS (JALIL CONTOUR NEXT TEST) STRIP    1 each by Does not apply route daily. As needed. METFORMIN (GLUCOPHAGE) 1000 MG TABLET    Take 1 tablet by mouth 2 times daily (with meals).     OXYCODONE-ACETAMINOPHEN (PERCOCET) 7.5-325 MG PER TABLET    Take 1 tablet by mouth in the EKG: All EKG's are interpreted by the Emergency Department Physician who either signs or Co-signs this chart in the [address] a cardiologist.        RADIOLOGY:   Non-plain film images such as CT, Ultrasound and MRI are read by the radiologist. Plain radiographic images are visualized and preliminarily interpreted by the emergency physician with the below findings:      [] Radiologist's Report Reviewed:  No orders to display         ED BEDSIDE ULTRASOUND:   Performed by ED Physician - none    LABS:    I have reviewed and interpreted all of the currently available lab results from this visit (ifapplicable):  No results found for this visit on 10/17/22. All other labs were within normal range or not returned as of this dictation. EMERGENCY DEPARTMENT COURSE and DIFFERENTIAL DIAGNOSIS/MDM:   Vitals:    Vitals:    10/17/22 1056   BP: 103/78   Pulse: 96   Temp: 98.2 °F (36.8 °C)   TempSrc: Oral   SpO2: 93%       MEDICATIONS ADMINISTERED IN ED:  Medications   HYDROmorphone (DILAUDID) injection 1 mg (has no administration in time range)       After initial evaluation examination I did have conversation with the patient about the upcoming plan, treatment possible disposition which they are agreeable to the times dictation. Patient advised that since his chronic pain medication that he is taking at home is not working for his back pain we will give him injection of Dilaudid 1 mg IM here in hopes that this will bring his pain down enough that his regular pain medication at home can help keep him manage until he can follow-up with either his primary care provider or pain management physician. He does state his understanding is. This is nontraumatic back pain no radiographs warranted at this time. This is just acute exacerbation of chronic pain especially from the patient's own description of the symptoms. No concern for any neurological deficit. Patient will be discharged after shot time is .       The patient advised that he does need to follow-up with his regular family physician within the next 1 to 2 days for evaluation. He was also given instructions of if symptoms worsens or new symptoms arise he should return back to emergency department for further evaluation work-up. Is this patient to be included in the SEP-1 Core Measure due to severe sepsis or septic shock? No   Exclusion criteria - the patient is NOT to be included for SEP-1 Core Measure due to: Infection is not suspected          CONSULTS:  None    PROCEDURES:  Procedures    CRITICAL CARE TIME    Total Critical Care time was 0 minutes, excluding separately reportable procedures. There was a high probability of clinically significant/life threatening deterioration in the patient's condition which required my urgent intervention. FINAL IMPRESSION      1. Acute exacerbation of chronic low back pain    2. Sacroiliac joint pain          DISPOSITION/PLAN   DISPOSITION Discharge - Pending Orders Complete 10/17/2022 10:59:14 AM      PATIENT REFERRED TO:  [address] [id number] [phone]    In 2 days      [facility]  [facility] [address] [facility]  [phone]    As needed, If symptoms worsen    DISCHARGE MEDICATIONS:  New Prescriptions    No medications on file       Comment: Please note this report has been produced using speech recognition software and may contain errorsrelated to that system including errors in grammar, punctuation, and spelling, as well as words and phrases that may be inappropriate. If there are any questions or concerns please feel free to contact the dictating providerfor clarification.     Eugenie Mace DO  Attending Emergency Physician               Eugenie Mace DO  10/17/22 1100

## 2022-10-18 ENCOUNTER — HOSPITAL ENCOUNTER (EMERGENCY)
Facility: HOSPITAL | Age: 57
Discharge: HOME | End: 2022-10-18
Attending: HOSPITALIST
Payer: MEDICARE

## 2022-10-18 VITALS
HEIGHT: 68 IN | SYSTOLIC BLOOD PRESSURE: 123 MMHG | DIASTOLIC BLOOD PRESSURE: 104 MMHG | TEMPERATURE: 97.8 F | BODY MASS INDEX: 26.52 KG/M2 | RESPIRATION RATE: 18 BRPM | WEIGHT: 175 LBS | OXYGEN SATURATION: 100 % | HEART RATE: 86 BPM

## 2022-10-18 DIAGNOSIS — M54.50 ACUTE EXACERBATION OF CHRONIC LOW BACK PAIN: Primary | ICD-10-CM

## 2022-10-18 DIAGNOSIS — M53.3 PAIN OF LEFT SACROILIAC JOINT: ICD-10-CM

## 2022-10-18 DIAGNOSIS — G89.29 ACUTE EXACERBATION OF CHRONIC LOW BACK PAIN: Primary | ICD-10-CM

## 2022-10-18 PROCEDURE — 99284 EMERGENCY DEPT VISIT MOD MDM: CPT

## 2022-10-18 PROCEDURE — 6360000002 HC RX W HCPCS: Performed by: HOSPITALIST

## 2022-10-18 PROCEDURE — 96372 THER/PROPH/DIAG INJ SC/IM: CPT

## 2022-10-18 RX ADMIN — HYDROMORPHONE HYDROCHLORIDE 1 MG: 1 INJECTION, SOLUTION INTRAMUSCULAR; INTRAVENOUS; SUBCUTANEOUS at 11:00

## 2022-10-18 ASSESSMENT — PAIN SCALES - GENERAL
PAINLEVEL_OUTOF10: 10
PAINLEVEL_OUTOF10: 10

## 2022-10-18 ASSESSMENT — PAIN DESCRIPTION - PAIN TYPE: TYPE: CHRONIC PAIN

## 2022-10-18 ASSESSMENT — PAIN DESCRIPTION - ORIENTATION
ORIENTATION: LOWER
ORIENTATION: LOWER

## 2022-10-18 ASSESSMENT — PAIN DESCRIPTION - DIRECTION: RADIATING_TOWARDS: LEFT LEG

## 2022-10-18 ASSESSMENT — PAIN - FUNCTIONAL ASSESSMENT: PAIN_FUNCTIONAL_ASSESSMENT: 0-10

## 2022-10-18 ASSESSMENT — PAIN DESCRIPTION - LOCATION
LOCATION: BACK
LOCATION: BACK

## 2022-10-18 ASSESSMENT — PAIN DESCRIPTION - DESCRIPTORS: DESCRIPTORS: STABBING

## 2022-10-18 ASSESSMENT — PAIN DESCRIPTION - FREQUENCY: FREQUENCY: CONTINUOUS

## 2022-10-18 NOTE — ED PROVIDER NOTES
[address] [facility] [address] ENCOUNTER      Pt Name: Viridiana Schuster  MRN: [id number]  YOB: 1965  Date of evaluation: 10/18/2022  Provider: Tyson Montes De Oca DO    CHIEF COMPLAINT       Chief Complaint   Patient presents with    Back Pain           HISTORY OF PRESENT ILLNESS  (Location/Symptom, Timing/Onset, Context/Setting, Quality, Duration, Modifying Factors, Severity.)   Viridiana Schuster is a 61 y.o. male who presents to the emergency department for acute exacerbation of chronic low back pain. Patient was actually seen here yesterday is on chronic pain medication already. Patient was given a injection of Dilaudid 1 mg IM. Patient left yesterday stating that he felt very well after the injection. Stated that he has had company staying with him and he has been sleeping on the couch allowing the company to stay in the bed and he believes this is what is caused his exacerbation of his back pain. He denies any direct trauma or blow. Denies any loss of bowel or bladder control. Denies any numbness tingling or weakness out of ordinary. He was able to ambulate to the room on his own without any difficulty. Denies any other complaint except for his low back pain. Nursing notes were reviewed. REVIEW OFSYSTEMS    (2-9 systems for level 4, 10 or more for level 5)   ROS:  General:  No fevers, no chills, no weakness  Cardiovascular:  No chest pain, no palpitations  Respiratory:  No shortness of breath, no cough, no wheezing  Gastrointestinal:  No pain, no nausea, no vomiting, no diarrhea  Musculoskeletal:  + Acute on chronic low back pain, no joint pain  Skin:  No rash, no easy bruising  Neurologic:  No speech problems, no headache, no extremity weakness  Psychiatric:  No anxiety  Genitourinary:  No dysuria, no hematuria    Except as noted above the remainder of the review of systems was reviewed and negative.        PAST MEDICAL HISTORY     Past Medical History:   Diagnosis Date    Asthma     Cancer ([facility] [address]     melanoma on back    Chronic back pain     Depression     Elevated liver enzymes     Erectile dysfunction     Headache     Hyperlipidemia     Hypertension     Hypogonadism     Melanoma ([facility])     Memory loss     S/P Botox injection     neck x12, face x6    Type II or unspecified type diabetes mellitus without mention of complication, not stated as uncontrolled          SURGICAL HISTORY       Past Surgical History:   Procedure Laterality Date    COLONOSCOPY N/A 10/29/2020    COLORECTAL CANCER SCREENING, NOT HIGH RISK performed by Vandana Olvera MD at [address] [place] [address]    TONSILLECTOMY      UPPER GASTROINTESTINAL ENDOSCOPY N/A 10/1/2020    EGD BIOPSY performed by Vandana Olvera MD at [address]       Previous Medications    ASPIRIN 81 MG EC TABLET    Take 81 mg by mouth daily. BUPROPION (WELLBUTRIN) 75 MG TABLET    Take 1 tablet by mouth 2 times daily. CYCLOBENZAPRINE (FLEXERIL) 10 MG TABLET    Take 10 mg by mouth 3 times daily as needed for Muscle spasms    DULOXETINE (CYMBALTA) 60 MG CAPSULE    Take 1 capsule by mouth daily. GLUCOSE BLOOD VI TEST STRIPS (JALIL CONTOUR NEXT TEST) STRIP    1 each by Does not apply route daily. As needed. METFORMIN (GLUCOPHAGE) 1000 MG TABLET    Take 1 tablet by mouth 2 times daily (with meals). OXYCODONE-ACETAMINOPHEN (PERCOCET) 7.5-325 MG PER TABLET    Take 1 tablet by mouth in the morning, at noon, and at bedtime. PROPRANOLOL (INDERAL) 20 MG TABLET    take 1 tablet by mouth twice a day    SUMATRIPTAN (IMITREX) 50 MG TABLET    Take 50 mg by mouth once as needed for Migraine       ALLERGIES     Patient has no known allergies.     FAMILY HISTORY       Family History   Problem Relation Age of Onset    Cancer Father     High Blood Pressure Father           SOCIAL HISTORY       Social History     Socioeconomic History    Marital status: Single     Spouse name: None    Number of children: None    Years of education: None    Highest education level: None   Tobacco Use    Smoking status: Never    Smokeless tobacco: Never   Vaping Use    Vaping Use: Never used   Substance and Sexual Activity    Alcohol use: No     Comment: quit 5 years ago    Drug use: No         PHYSICAL EXAM    (up to 7 for level 4, 8 or more for level 5)     ED Triage Vitals   BP Temp Temp src Pulse Resp SpO2 Height Weight   -- -- -- -- -- -- -- --       Physical Exam  General :Patient is awake, alert, oriented, in no acute distress, nontoxic appearing  HEENT: Pupils are equally round and reactive to light, EOMI, conjunctivae clear. Oral mucosa is moist, no exudate. Uvula is midline  Neck: Neck is supple, full range of motion, trachea midline, no palpable tenderness or step-offs noted  Musculoskeletal: 5 out of 5 strength in all 4 extremities. No focal muscle deficits are appreciated  Neuro: Motor intact, sensory intact, level of consciousness is normal.-L1, L2 adducts thighs without difficulty; L3 extended knee bilaterally; L4 dorsiflexion of ankle; L5 points great toe up; knee reflex intact, flexes knees without  difficulty, plantar flex his toes without difficulty, groin and perineal sensation are intact. Dermatology: Skin is warm and dry  Psych: Mentation is grossly normal, cognition is grossly normal. Affect is appropriate. BACK: There is not thoracic but positive lower lumbar midline tenderness to palpation or step-offs. Paraspinal tenderness to palpation is not present in the thoracic or lumbar region. No overlying rashes. LE strength is 5/5. LE light touch is intact. LE DTR's are 2+ in the patellas and achilles. Straight leg test is negative on the RIGHT, negative on the LEFT. There is palpable tenderness over the left SI joint also.       DIAGNOSTIC RESULTS     EKG: All EKG's are interpreted by the Emergency Department Physician who either signs or Co-signs this chart in the absenceof a cardiologist.        RADIOLOGY:   Non-plain film images such as CT, Ultrasound and MRI are read by the radiologist. Plain radiographic images are visualized and preliminarily interpreted by the emergency physician with the below findings:      [] Radiologist's Report Reviewed:  No orders to display         ED BEDSIDE ULTRASOUND:   Performed by ED Physician - none    LABS:    I have reviewed and interpreted all of the currently available lab results from this visit (ifapplicable):  No results found for this visit on 10/18/22. All other labs were within normal range or not returned as of this dictation. EMERGENCY DEPARTMENT COURSE and DIFFERENTIAL DIAGNOSIS/MDM:   Vitals:    Vitals:    10/18/22 1051   BP: (!) 127/94   Pulse: 86   Resp: 18   Temp: 97.8 °F (36.6 °C)   TempSrc: Oral   Weight: 175 lb (79.4 kg)   Height: 5' 8\" (1.727 m)       MEDICATIONS ADMINISTERED IN ED:  Medications   HYDROmorphone (DILAUDID) injection 1 mg (has no administration in time range)       After initial evaluation examination I did have conversation with the patient about the upcoming plan, treatment possible disposition which they are agreeable to the times dictation. Patient vies this is a secondhand role that he has been here for his acute on chronic low back pain. Yesterday he actually forgot he was even here for his back pain when he is leaving and stated that his headache much improved. Patient usually comes here for headaches but does not get any narcotics for this suspect the patient has now realized that he could possibly get narcotics for his back pain since he is already on chronic pain medication and takes oxycodone 10/325. Patient advised he cannot keep coming to the emergency department for pain injections because of his chronic pain. He states that he does not get to see his pain management physician for another month or so.   Patient advised though that we cannot just continue to give him narcotic injections that this would be the last time that we give him one here if he presents again for this we will only give him an extra dose of his regular pain medication specially since there is been no traumatic injury or change. However he does have some lumbar discomfort today that he did not have yesterday. However he still has the left SI joint discomfort. Patient states his understanding and advised that he understands how the spray looks with him coming in and 2 days in a row for this. Patient will be given injection of Dilaudid 1 mg IM and will be discharged home in stable condition but again advised that we cannot continue to give him this medication for his acute exacerbations. 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 Otherwise he patient will be discharged stable condition after shot time is  does state his understanding that we will refrain from giving any IM or IV medications and will switch to oral.      The patient advised that he does need to follow-up with his regular family physician within the next 1 to 2 days for evaluation. Also given instruction of if symptoms worsens or new symptoms arise he should return back to emergency department for further evaluation work-up. Is this patient to be included in the SEP-1 Core Measure due to severe sepsis or septic shock? No   Exclusion criteria - the patient is NOT to be included for SEP-1 Core Measure due to: Infection is not suspected          CONSULTS:  None    PROCEDURES:  Procedures    CRITICAL CARE TIME    Total Critical Care time was 0 minutes, excluding separately reportable procedures. There was a high probability of clinically significant/life threatening deterioration in the patient's condition which required my urgent intervention. FINAL IMPRESSION      1. Acute exacerbation of chronic low back pain    2.  Pain of left sacroiliac joint          DISPOSITION/PLAN   DISPOSITION Discharge - Pending Orders Complete 10/18/2022 10:56:41 AM      PATIENT REFERRED TO:  April [address], [address] [facility] [id number] [phone]    In 2 days      [facility]  [address] [facility]  [phone]    As needed, If symptoms worsen    DISCHARGE MEDICATIONS:  New Prescriptions    No medications on file       Comment: Please note this report has been produced using speech recognition software and may contain errorsrelated to that system including errors in grammar, punctuation, and spelling, as well as words and phrases that may be inappropriate. If there are any questions or concerns please feel free to contact the dictating providerfor clarification.     Antonio Solorzano DO  Attending Emergency Physician               Antonio Solorzano DO  10/18/22 6530

## 2022-10-18 NOTE — ED NOTES
Discharge instructions reviewed with patient, verbalizes understanding. Patient now rates pain 9/10. Patient discharged home via POV. Mother here to provide transportation.       Shamar Brady RN  10/18/22 8729

## 2022-10-18 NOTE — ED TRIAGE NOTES
Patient presents today with chronic low back pain that he rates 10/10. He reports that he pain has gotten worse over the last few days. He reports that is seeing a specialist soon. He denies trauma.

## 2023-06-07 ENCOUNTER — HOSPITAL ENCOUNTER (EMERGENCY)
Facility: HOSPITAL | Age: 58
Discharge: HOME | End: 2023-06-07
Attending: HOSPITALIST
Payer: MEDICARE

## 2023-06-07 VITALS
HEIGHT: 68 IN | OXYGEN SATURATION: 99 % | DIASTOLIC BLOOD PRESSURE: 75 MMHG | BODY MASS INDEX: 26.52 KG/M2 | RESPIRATION RATE: 16 BRPM | HEART RATE: 100 BPM | TEMPERATURE: 98 F | SYSTOLIC BLOOD PRESSURE: 108 MMHG | WEIGHT: 175 LBS

## 2023-06-07 DIAGNOSIS — G89.29 ACUTE EXACERBATION OF CHRONIC LOW BACK PAIN: Primary | ICD-10-CM

## 2023-06-07 DIAGNOSIS — M54.50 ACUTE EXACERBATION OF CHRONIC LOW BACK PAIN: Primary | ICD-10-CM

## 2023-06-07 PROCEDURE — 6360000002 HC RX W HCPCS: Performed by: HOSPITALIST

## 2023-06-07 RX ORDER — ALPRAZOLAM 0.25 MG/1
0.25 TABLET ORAL NIGHTLY PRN
COMMUNITY

## 2023-06-07 RX ORDER — MORPHINE SULFATE/0.9% NACL/PF 1 MG/ML
SYRINGE (ML) INJECTION CONTINUOUS
COMMUNITY

## 2023-06-07 RX ADMIN — HYDROMORPHONE HYDROCHLORIDE 1 MG: 1 INJECTION, SOLUTION INTRAMUSCULAR; INTRAVENOUS; SUBCUTANEOUS at 15:10

## 2023-06-07 ASSESSMENT — LIFESTYLE VARIABLES
HOW MANY STANDARD DRINKS CONTAINING ALCOHOL DO YOU HAVE ON A TYPICAL DAY: PATIENT DOES NOT DRINK
HOW OFTEN DO YOU HAVE A DRINK CONTAINING ALCOHOL: NEVER

## 2023-06-07 ASSESSMENT — PAIN DESCRIPTION - FREQUENCY: FREQUENCY: CONTINUOUS

## 2023-06-07 ASSESSMENT — PAIN SCALES - GENERAL
PAINLEVEL_OUTOF10: 10
PAINLEVEL_OUTOF10: 10

## 2023-06-07 ASSESSMENT — PAIN - FUNCTIONAL ASSESSMENT: PAIN_FUNCTIONAL_ASSESSMENT: 0-10

## 2023-06-07 ASSESSMENT — PAIN DESCRIPTION - ORIENTATION: ORIENTATION: LEFT;LOWER

## 2023-06-07 ASSESSMENT — PAIN DESCRIPTION - LOCATION: LOCATION: BACK

## 2023-06-07 ASSESSMENT — PAIN DESCRIPTION - PAIN TYPE: TYPE: ACUTE PAIN
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
## 2024-03-09 ENCOUNTER — APPOINTMENT (OUTPATIENT)
Dept: GENERAL RADIOLOGY | Facility: HOSPITAL | Age: 59
End: 2024-03-09
Attending: EMERGENCY MEDICINE
Payer: MEDICARE

## 2024-03-09 ENCOUNTER — HOSPITAL ENCOUNTER (EMERGENCY)
Facility: HOSPITAL | Age: 59
Discharge: HOME | End: 2024-03-09
Attending: EMERGENCY MEDICINE
Payer: MEDICARE

## 2024-03-09 VITALS
RESPIRATION RATE: 16 BRPM | DIASTOLIC BLOOD PRESSURE: 79 MMHG | WEIGHT: 175 LBS | OXYGEN SATURATION: 93 % | TEMPERATURE: 98.2 F | BODY MASS INDEX: 26.52 KG/M2 | SYSTOLIC BLOOD PRESSURE: 103 MMHG | HEART RATE: 107 BPM | HEIGHT: 68 IN

## 2024-03-09 DIAGNOSIS — S63.501A RIGHT WRIST SPRAIN, INITIAL ENCOUNTER: Primary | ICD-10-CM

## 2024-03-09 PROCEDURE — 99284 EMERGENCY DEPT VISIT MOD MDM: CPT

## 2024-03-09 PROCEDURE — 73110 X-RAY EXAM OF WRIST: CPT

## 2024-03-09 PROCEDURE — 73130 X-RAY EXAM OF HAND: CPT

## 2024-03-09 PROCEDURE — 96372 THER/PROPH/DIAG INJ SC/IM: CPT

## 2024-03-09 PROCEDURE — 6360000002 HC RX W HCPCS: Performed by: EMERGENCY MEDICINE

## 2024-03-09 RX ORDER — KETOROLAC TROMETHAMINE 15 MG/ML
15 INJECTION, SOLUTION INTRAMUSCULAR; INTRAVENOUS ONCE
Status: COMPLETED | OUTPATIENT
Start: 2024-03-09 | End: 2024-03-09

## 2024-03-09 RX ORDER — KETOROLAC TROMETHAMINE 10 MG/1
10 TABLET, FILM COATED ORAL EVERY 6 HOURS PRN
Qty: 20 TABLET | Refills: 0 | Status: SHIPPED | OUTPATIENT
Start: 2024-03-09 | End: 2025-03-09

## 2024-03-09 RX ADMIN — KETOROLAC TROMETHAMINE 15 MG: 15 INJECTION, SOLUTION INTRAMUSCULAR; INTRAVENOUS at 21:41

## 2024-03-09 ASSESSMENT — PAIN SCALES - GENERAL: PAINLEVEL_OUTOF10: 10

## 2024-03-09 ASSESSMENT — PAIN DESCRIPTION - FREQUENCY: FREQUENCY: CONTINUOUS

## 2024-03-09 ASSESSMENT — PAIN DESCRIPTION - LOCATION: LOCATION: HAND;WRIST

## 2024-03-09 ASSESSMENT — LIFESTYLE VARIABLES
HOW OFTEN DO YOU HAVE A DRINK CONTAINING ALCOHOL: NEVER
HOW MANY STANDARD DRINKS CONTAINING ALCOHOL DO YOU HAVE ON A TYPICAL DAY: PATIENT DOES NOT DRINK

## 2024-03-09 ASSESSMENT — PAIN DESCRIPTION - DESCRIPTORS: DESCRIPTORS: THROBBING

## 2024-03-09 ASSESSMENT — PAIN DESCRIPTION - ORIENTATION: ORIENTATION: RIGHT

## 2024-03-09 ASSESSMENT — PAIN - FUNCTIONAL ASSESSMENT: PAIN_FUNCTIONAL_ASSESSMENT: 0-10

## 2024-03-09 ASSESSMENT — PAIN DESCRIPTION - PAIN TYPE: TYPE: ACUTE PAIN

## 2024-03-10 NOTE — ED NOTES
Patient fell about 1945 injuring his right wrist and hand. Patient states that he tripped over something and fell in the kitchen and that is what caused him to fall.

## 2024-03-10 NOTE — ED PROVIDER NOTES
.        [facility]  EMERGENCY DEPARTMENT ENCOUNTER        Pt Name: Tato Thornton  MRN: [id number]  Birthdate 1965  Date of evaluation: 3/9/2024  Provider: Cher Robles MD  PCP: Kimmie Bradley PA-C  Note Started: 9:27 PM EST 3/9/24    CHIEF COMPLAINT       Chief Complaint   Patient presents with    Wrist Injury    Hand Injury       HISTORY OF PRESENT ILLNESS: 1 or more Elements     History from : Patient    Limitations to history : None    Tato Thornton is a 58 y.o. male who presents complaining that he was cooking when he went to walk across the floor slipped and fell onto his right arm.  He has a history of fracture of this arm this happened about 2 hours ago he did not sustain any other injuries now complains of pain in the right hand and wrist denies any numbness has full range of motion of the fingers and wrist although it hurts to move them.    Nursing Notes were all reviewed and agreed with or any disagreements were addressed in the HPI.    REVIEW OF SYSTEMS :      Review of Systems     systems reviewed and negative except as in HPI/MDM    SURGICAL HISTORY     Past Surgical History:   Procedure Laterality Date    COLONOSCOPY N/A 10/29/2020    COLORECTAL CANCER SCREENING, NOT HIGH RISK performed by Alexis Montes MD at [facility] ENDOSCOPY    INFUSION PUMP IMPLANTATION  03/2023    morphine pain pump    SKIN CANCER EXCISION      back    TONSILLECTOMY      UPPER GASTROINTESTINAL ENDOSCOPY N/A 10/01/2020    EGD BIOPSY performed by Alexis Montes MD at [facility] ENDOSCOPY       CURRENTMEDICATIONS       Previous Medications    ALPRAZOLAM (XANAX) 0.25 MG TABLET    Take 1 tablet by mouth nightly as needed for Sleep.    ASPIRIN 81 MG EC TABLET    Take 81 mg by mouth daily.    BUPROPION (WELLBUTRIN) 75 MG TABLET    Take 1 tablet by mouth 2 times daily.    CYCLOBENZAPRINE (FLEXERIL) 10 MG TABLET    Take 10 mg by mouth 3 times daily as needed for Muscle spasms    DULOXETINE

## 2024-04-07 ENCOUNTER — HOSPITAL ENCOUNTER (EMERGENCY)
Facility: HOSPITAL | Age: 59
Discharge: HOME | End: 2024-04-07
Attending: EMERGENCY MEDICINE
Payer: MEDICARE

## 2024-04-07 ENCOUNTER — APPOINTMENT (OUTPATIENT)
Dept: GENERAL RADIOLOGY | Facility: HOSPITAL | Age: 59
End: 2024-04-07
Payer: MEDICARE

## 2024-04-07 VITALS
SYSTOLIC BLOOD PRESSURE: 110 MMHG | OXYGEN SATURATION: 99 % | DIASTOLIC BLOOD PRESSURE: 69 MMHG | HEIGHT: 68 IN | RESPIRATION RATE: 20 BRPM | BODY MASS INDEX: 27.28 KG/M2 | HEART RATE: 99 BPM | TEMPERATURE: 98.9 F | WEIGHT: 180 LBS

## 2024-04-07 DIAGNOSIS — J18.9 PNEUMONIA DUE TO INFECTIOUS ORGANISM, UNSPECIFIED LATERALITY, UNSPECIFIED PART OF LUNG: ICD-10-CM

## 2024-04-07 DIAGNOSIS — J45.901 MODERATE ASTHMA WITH ACUTE EXACERBATION, UNSPECIFIED WHETHER PERSISTENT: Primary | ICD-10-CM

## 2024-04-07 LAB
ALBUMIN SERPL-MCNC: 4.2 G/DL (ref 3.4–4.8)
ALBUMIN/GLOB SERPL: 1.4 {RATIO} (ref 0.8–2)
ALP SERPL-CCNC: 48 U/L (ref 25–100)
ALT SERPL-CCNC: 6 U/L (ref 4–36)
ANION GAP SERPL CALCULATED.3IONS-SCNC: 11 MMOL/L (ref 3–16)
AST SERPL-CCNC: 12 U/L (ref 8–33)
BASOPHILS # BLD: 0.1 K/UL (ref 0–0.1)
BASOPHILS NFR BLD: 0.8 %
BILIRUB SERPL-MCNC: 0.3 MG/DL (ref 0.3–1.2)
BUN SERPL-MCNC: 8 MG/DL (ref 6–20)
CALCIUM SERPL-MCNC: 9 MG/DL (ref 8.5–10.5)
CHLORIDE SERPL-SCNC: 100 MMOL/L (ref 98–107)
CO2 SERPL-SCNC: 26 MMOL/L (ref 20–30)
CREAT SERPL-MCNC: 1 MG/DL (ref 0.4–1.2)
EOSINOPHIL # BLD: 0.3 K/UL (ref 0–0.4)
EOSINOPHIL NFR BLD: 4.3 %
ERYTHROCYTE [DISTWIDTH] IN BLOOD BY AUTOMATED COUNT: 13.4 % (ref 11–16)
FLUAV AG NPH QL: NEGATIVE
FLUBV AG NPH QL: NEGATIVE
GFR SERPLBLD CREATININE-BSD FMLA CKD-EPI: 86 ML/MIN/{1.73_M2}
GLOBULIN SER CALC-MCNC: 3.1 G/DL
GLUCOSE SERPL-MCNC: 113 MG/DL (ref 74–106)
HCT VFR BLD AUTO: 31.9 % (ref 40–54)
HGB BLD-MCNC: 10.3 G/DL (ref 13–18)
IMM GRANULOCYTES # BLD: 0.1 K/UL
IMM GRANULOCYTES NFR BLD: 0.9 % (ref 0–5)
LACTATE BLDV-SCNC: 1.8 MMOL/L (ref 0.4–2)
LYMPHOCYTES # BLD: 2.5 K/UL (ref 1.5–4)
LYMPHOCYTES NFR BLD: 31.6 %
MCH RBC QN AUTO: 28 PG (ref 27–32)
MCHC RBC AUTO-ENTMCNC: 32.3 G/DL (ref 31–35)
MCV RBC AUTO: 86.7 FL (ref 80–100)
MONOCYTES # BLD: 0.6 K/UL (ref 0.2–0.8)
MONOCYTES NFR BLD: 7.6 %
NEUTROPHILS # BLD: 4.4 K/UL (ref 2–7.5)
NEUTS SEG NFR BLD: 54.8 %
NT-PROBNP SERPL-MCNC: <5 PG/ML (ref 0–1800)
PLATELET # BLD AUTO: 285 K/UL (ref 150–400)
PMV BLD AUTO: 8.4 FL (ref 6–10)
POTASSIUM SERPL-SCNC: 4.4 MMOL/L (ref 3.4–5.1)
PROT SERPL-MCNC: 7.3 G/DL (ref 6.4–8.3)
RBC # BLD AUTO: 3.68 M/UL (ref 4.5–6)
SARS-COV-2 RDRP RESP QL NAA+PROBE: NOT DETECTED
SODIUM SERPL-SCNC: 137 MMOL/L (ref 136–145)
TROPONIN, HIGH SENSITIVITY: 6 NG/L (ref 0–22)
WBC # BLD AUTO: 8 K/UL (ref 4–11)

## 2024-04-07 PROCEDURE — 87635 SARS-COV-2 COVID-19 AMP PRB: CPT

## 2024-04-07 PROCEDURE — 94640 AIRWAY INHALATION TREATMENT: CPT

## 2024-04-07 PROCEDURE — 36415 COLL VENOUS BLD VENIPUNCTURE: CPT

## 2024-04-07 PROCEDURE — 6370000000 HC RX 637 (ALT 250 FOR IP): Performed by: EMERGENCY MEDICINE

## 2024-04-07 PROCEDURE — 83605 ASSAY OF LACTIC ACID: CPT

## 2024-04-07 PROCEDURE — 93005 ELECTROCARDIOGRAM TRACING: CPT

## 2024-04-07 PROCEDURE — 71045 X-RAY EXAM CHEST 1 VIEW: CPT

## 2024-04-07 PROCEDURE — 83880 ASSAY OF NATRIURETIC PEPTIDE: CPT

## 2024-04-07 PROCEDURE — 84484 ASSAY OF TROPONIN QUANT: CPT

## 2024-04-07 PROCEDURE — 80053 COMPREHEN METABOLIC PANEL: CPT

## 2024-04-07 PROCEDURE — 87040 BLOOD CULTURE FOR BACTERIA: CPT

## 2024-04-07 PROCEDURE — 99285 EMERGENCY DEPT VISIT HI MDM: CPT

## 2024-04-07 PROCEDURE — 85025 COMPLETE CBC W/AUTO DIFF WBC: CPT

## 2024-04-07 PROCEDURE — 87804 INFLUENZA ASSAY W/OPTIC: CPT

## 2024-04-07 RX ORDER — AZITHROMYCIN 250 MG/1
500 TABLET, FILM COATED ORAL ONCE
Status: COMPLETED | OUTPATIENT
Start: 2024-04-07 | End: 2024-04-07

## 2024-04-07 RX ORDER — AZITHROMYCIN 250 MG/1
TABLET, FILM COATED ORAL
Qty: 6 TABLET | Refills: 0 | Status: SHIPPED | OUTPATIENT
Start: 2024-04-07 | End: 2024-04-17

## 2024-04-07 RX ORDER — PREDNISONE 20 MG/1
40 TABLET ORAL ONCE
Status: COMPLETED | OUTPATIENT
Start: 2024-04-07 | End: 2024-04-07

## 2024-04-07 RX ORDER — PREDNISONE 10 MG/1
40 TABLET ORAL DAILY
Qty: 20 TABLET | Refills: 0 | Status: SHIPPED | OUTPATIENT
Start: 2024-04-07 | End: 2024-04-12

## 2024-04-07 RX ORDER — IPRATROPIUM BROMIDE AND ALBUTEROL SULFATE 2.5; .5 MG/3ML; MG/3ML
1 SOLUTION RESPIRATORY (INHALATION) ONCE
Status: COMPLETED | OUTPATIENT
Start: 2024-04-07 | End: 2024-04-07

## 2024-04-07 RX ADMIN — IPRATROPIUM BROMIDE AND ALBUTEROL SULFATE 1 DOSE: .5; 3 SOLUTION RESPIRATORY (INHALATION) at 21:36

## 2024-04-07 RX ADMIN — PREDNISONE 40 MG: 20 TABLET ORAL at 22:30

## 2024-04-07 RX ADMIN — AZITHROMYCIN MONOHYDRATE 500 MG: 250 TABLET ORAL at 22:30

## 2024-04-08 NOTE — ED TRIAGE NOTES
Pt with complaints of soa, and cough for the past couple days. Patient states that he has also been wheezing as well.

## 2024-04-08 NOTE — ED PROVIDER NOTES
[facility]     EMERGENCY DEPARTMENT ENCOUNTER            Pt Name: Tato Thornton   MRN: [id number]   Birthdate 1965   Date of evaluation: 4/7/2024   Provider: ZAK HAYDEN MD   PCP: Kimmie Bradley PA-C   Note Started: 8:23 PM EDT 4/7/24          CHIEF COMPLAINT     Chief Complaint   Patient presents with    Shortness of Breath    Cough             HISTORY OF PRESENT ILLNESS:   History from : Patient   Limitations to history : None     Tato Thornton is a 58 y.o. male who presents feel like he has been smothered and short of breath.  Patient states that for the past 2 days he has had a nonproductive cough felt increasingly short of breath and this afternoon feels like he is being smothered.  He has no leg swelling, PND or orthopnea.  He feels the same in any position.  He feels short of breath at rest.  He is a non-smoker, history of asthma, no heart history.    Nursing Notes were all reviewed and agreed with, or any disagreements were addressed in the HPI.     REVIEW OF SYSTEMS :    Positives and Pertinent negatives as per HPI.      MEDICAL HISTORY   has a past medical history of Asthma, Cancer (HCC), Chronic back pain, Depression, Elevated liver enzymes, Erectile dysfunction, Headache, Hyperlipidemia, Hypertension, Hypogonadism, Melanoma (HCC), Memory loss, S/P Botox injection, and Type II or unspecified type diabetes mellitus without mention of complication, not stated as uncontrolled.    Past Surgical History:   Procedure Laterality Date    COLONOSCOPY N/A 10/29/2020    COLORECTAL CANCER SCREENING, NOT HIGH RISK performed by Alexis Montes MD at [facility] ENDOSCOPY    INFUSION PUMP IMPLANTATION  03/2023    morphine pain pump    SKIN CANCER EXCISION      back    TONSILLECTOMY      UPPER GASTROINTESTINAL ENDOSCOPY N/A 10/01/2020    EGD BIOPSY performed by Alexis Montes MD at [facility] ENDOSCOPY      CURRENTMEDICATIONS       Previous Medications    ALPRAZOLAM  Alert and oriented to person, place and time/Awake

## 2024-04-12 LAB
BACTERIA BLD CULT ORG #2: NORMAL
BACTERIA BLD CULT: NORMAL

## 2024-05-07 ENCOUNTER — HOSPITAL ENCOUNTER (OUTPATIENT)
Dept: GENERAL RADIOLOGY | Facility: HOSPITAL | Age: 59
Discharge: HOME | End: 2024-05-07
Payer: MEDICARE

## 2024-05-07 ENCOUNTER — HOSPITAL ENCOUNTER (OUTPATIENT)
Facility: HOSPITAL | Age: 59
Discharge: HOME | End: 2024-05-07
Payer: MEDICARE

## 2024-05-07 DIAGNOSIS — J18.9 PNEUMONIA DUE TO INFECTIOUS ORGANISM, UNSPECIFIED LATERALITY, UNSPECIFIED PART OF LUNG: ICD-10-CM

## 2024-05-07 PROCEDURE — 71046 X-RAY EXAM CHEST 2 VIEWS: CPT

## 2024-05-15 ENCOUNTER — HOSPITAL ENCOUNTER (OUTPATIENT)
Dept: CT IMAGING | Facility: HOSPITAL | Age: 59
Discharge: HOME | End: 2024-05-15
Payer: MEDICARE

## 2024-05-15 DIAGNOSIS — R93.89 ABNORMAL CHEST X-RAY: ICD-10-CM

## 2024-05-15 DIAGNOSIS — R05.3 CHRONIC COUGH: ICD-10-CM

## 2024-05-15 PROCEDURE — 71250 CT THORAX DX C-: CPT

## 2024-05-29 ENCOUNTER — OFFICE VISIT (OUTPATIENT)
Dept: PULMONOLOGY | Facility: CLINIC | Age: 59
End: 2024-05-29
Payer: MEDICARE

## 2024-05-29 VITALS
DIASTOLIC BLOOD PRESSURE: 52 MMHG | HEIGHT: 68 IN | BODY MASS INDEX: 27.28 KG/M2 | HEART RATE: 92 BPM | WEIGHT: 180 LBS | OXYGEN SATURATION: 94 % | SYSTOLIC BLOOD PRESSURE: 93 MMHG

## 2024-05-29 DIAGNOSIS — J45.40 MODERATE PERSISTENT ASTHMA WITHOUT COMPLICATION: ICD-10-CM

## 2024-05-29 DIAGNOSIS — J30.9 ALLERGIC RHINITIS, UNSPECIFIED SEASONALITY, UNSPECIFIED TRIGGER: ICD-10-CM

## 2024-05-29 DIAGNOSIS — R93.89 ABNORMAL CHEST CT: Primary | ICD-10-CM

## 2024-05-29 DIAGNOSIS — R05.3 CHRONIC COUGH: ICD-10-CM

## 2024-05-29 PROCEDURE — 99204 OFFICE O/P NEW MOD 45 MIN: CPT | Performed by: NURSE PRACTITIONER

## 2024-05-29 RX ORDER — FOLIC ACID 1 MG/1
1 TABLET ORAL DAILY
COMMUNITY

## 2024-05-29 RX ORDER — TOPIRAMATE 25 MG/1
TABLET ORAL
COMMUNITY

## 2024-05-29 RX ORDER — PROMETHAZINE HYDROCHLORIDE 25 MG/1
TABLET ORAL
COMMUNITY

## 2024-05-29 RX ORDER — ALPRAZOLAM 0.25 MG/1
1 TABLET ORAL 2 TIMES DAILY PRN
COMMUNITY

## 2024-05-29 RX ORDER — TAMSULOSIN HYDROCHLORIDE 0.4 MG/1
1 CAPSULE ORAL NIGHTLY
COMMUNITY

## 2024-05-29 RX ORDER — BUDESONIDE AND FORMOTEROL FUMARATE DIHYDRATE 160; 4.5 UG/1; UG/1
2 AEROSOL RESPIRATORY (INHALATION) 2 TIMES DAILY
Qty: 1 EACH | Refills: 5 | Status: SHIPPED | OUTPATIENT
Start: 2024-05-29

## 2024-05-29 RX ORDER — TRAMADOL HYDROCHLORIDE 50 MG/1
1 TABLET ORAL 3 TIMES DAILY PRN
COMMUNITY

## 2024-05-29 RX ORDER — AMOXICILLIN AND CLAVULANATE POTASSIUM 875; 125 MG/1; MG/1
1 TABLET, FILM COATED ORAL 2 TIMES DAILY
Qty: 14 TABLET | Refills: 0 | Status: SHIPPED | OUTPATIENT
Start: 2024-05-29

## 2024-05-29 RX ORDER — DIAZEPAM 5 MG/1
TABLET ORAL
COMMUNITY

## 2024-05-29 RX ORDER — MECLIZINE HYDROCHLORIDE 25 MG/1
TABLET ORAL
COMMUNITY

## 2024-05-29 RX ORDER — GALCANEZUMAB 120 MG/ML
INJECTION, SOLUTION SUBCUTANEOUS
COMMUNITY
Start: 2024-05-10

## 2024-05-29 RX ORDER — ALBUTEROL SULFATE 90 UG/1
2 AEROSOL, METERED RESPIRATORY (INHALATION) EVERY 4 HOURS PRN
Qty: 8 G | Refills: 5 | Status: SHIPPED | OUTPATIENT
Start: 2024-05-29

## 2024-05-29 RX ORDER — KETOROLAC TROMETHAMINE 10 MG/1
1 TABLET, FILM COATED ORAL EVERY 6 HOURS PRN
COMMUNITY
Start: 2024-03-09

## 2024-05-29 NOTE — PROGRESS NOTES
"     New Pulmonary Patient Office Visit      Patient Name: Sumit Echeverria    Referring Physician: Drake Oscar*    Chief Complaint:    Chief Complaint   Patient presents with    Shortness of Breath    Consult       History of Present Illness: Sumit Echeverria is a 58 y.o. male who is here today to establish care with Pulmonary for breathing difficulties.  He was seen in the [facility] emergency department last month for management of an asthma exacerbation, and was treated with prednisone, azithromycin, and DuoNebs.  Since that time, he notes that his shortness of breath and cough have now resolved.  Prior to his ED visit, he denies any known sick contacts.  He notes that he is disabled secondary to chronic back injuries which caused severe pain.  He notes that of his back \"throws a spell\", and will cause him to have nausea and vomiting.    Duration: Asthma since childhood  Associated Symptoms: Intermittent cough, wheezing and shortness of breath, no fevers, no hemoptysis, no unintended weight loss, + allergic rhinitis  Modifying Factors: Symptoms are exacerbated by exposure to allergens such as freshly mowed grass  Supplemental Oxygen: No  Ever had Blood Clots: No  Cardiac History: No    Subjective      Review of Systems:   Review of Systems   Constitutional:  Negative for fever and unexpected weight change.   Respiratory:          See HPI   Cardiovascular:  Negative for chest pain and leg swelling.   Musculoskeletal:  Positive for back pain.   Allergic/Immunologic: Positive for environmental allergies.        Past Medical History:   Past Medical History:   Diagnosis Date    Anxiety     Cancer     Depression     Diabetes mellitus     Hyperlipidemia     Hypertension     Migraine        Past Surgical History:   Past Surgical History:   Procedure Laterality Date    AXILLARY LYMPH NODE BIOPSY/EXCISION      BRAIN SURGERY      SKIN CANCER EXCISION  2014    SKIN CANCER EXCISION  2022    TOE AMPUTATION " Left 2022       Family History:   Family History   Problem Relation Age of Onset    Cancer Father     Hypertension Father     High cholesterol Father     Hypertension Brother        Social History:   Social History     Socioeconomic History    Marital status: Single   Tobacco Use    Smoking status: Never     Passive exposure: Past    Smokeless tobacco: Never   Vaping Use    Vaping status: Never Used   Substance and Sexual Activity    Alcohol use: Not Currently    Drug use: No    Sexual activity: Defer       Medications:     Current Outpatient Medications:     albuterol sulfate  (90 Base) MCG/ACT inhaler, albuterol sulfate HFA 90 mcg/actuation aerosol inhaler  INHALE 1 PUFF BY MOUTH EVERY 4 HOURS AS NEEDED, Disp: , Rfl:     ALPRAZolam (XANAX) 0.25 MG tablet, Take 1 tablet by mouth 2 (Two) Times a Day As Needed., Disp: , Rfl:     amitriptyline (ELAVIL) 10 MG tablet, Take 1 tablet by mouth Every Night., Disp: 30 tablet, Rfl: 2    aspirin 81 MG EC tablet, Take  by mouth., Disp: , Rfl:     buPROPion SR (WELLBUTRIN SR) 100 MG 12 hr tablet, Wellbutrin SR, Disp: , Rfl:     butalbital-acetaminophen-caffeine (FIORICET, ESGIC) -40 MG per tablet, butalbital-acetaminophen-caffeine 50 mg-325 mg-40 mg tablet  TK 1 T PO Q 4 H PRF HEADACHES, Disp: , Rfl:     cyclobenzaprine (FLEXERIL) 10 MG tablet, , Disp: , Rfl: 3    diazePAM (VALIUM) 5 MG tablet, TAKE 1 TABLET BY MOUTH TWICE DAILY AS NEEDED FOR SEVERE ANXIETY ONLY, Disp: , Rfl:     DULoxetine (CYMBALTA) 60 MG capsule, Take  by mouth. Take one capsule by mouth daily, Disp: , Rfl:     Emgality 120 MG/ML auto-injector pen, , Disp: , Rfl:     Fluticasone-Salmeterol (ADVAIR) 250-50 MCG/ACT DISKUS, Inhale 1 puff 2 (Two) Times a Day., Disp: , Rfl:     folic acid (FOLVITE) 1 MG tablet, Take 1 tablet by mouth Daily., Disp: , Rfl:     glucose blood test strip, , Disp: , Rfl:     glucose blood test strip, OneTouch Ultra Blue Test Strip, Disp: , Rfl:     HYDROcodone-acetaminophen  (NORCO)  MG per tablet, Take  by mouth., Disp: , Rfl:     ketorolac (TORADOL) 10 MG tablet, Take 1 tablet by mouth Every 6 (Six) Hours As Needed., Disp: , Rfl:     meclizine (ANTIVERT) 25 MG tablet, TAKE 1 TABLET BY MOUTH TWICE DAILY AS NEEDED FOR VERTIGO, Disp: , Rfl:     METFORMIN & DIET MANAGE PROD PO, metformin, Disp: , Rfl:     metFORMIN (GLUCOPHAGE) 1000 MG tablet, Take  by mouth. Take one tablet by mouth twice daily, Disp: , Rfl:     polyethylene glycol (MIRALAX) 17 GM/SCOOP powder, Mix 238g powder with 64 oz of clear liquid. Starting at 5pm drink 80z every 10-15 minutes until consumed., Disp: 238 g, Rfl: 0    promethazine (PHENERGAN) 25 MG tablet, , Disp: , Rfl:     propranolol (INDERAL) 20 MG tablet, take 1 tablet by mouth twice a day, Disp: , Rfl:     rosuvastatin (CRESTOR) 20 MG tablet, Take 1 tablet by mouth Every Evening., Disp: , Rfl:     Rybelsus 7 MG tablet, TAKE 1 TABLET BY MOUTH EVERY DAY - START AFTER 30 DAYS OF 3MG SAMPLES, Disp: , Rfl:     sodium chloride 0.9 % solution 225 mL with Morphine PF 1 MG/ML solution 25,000 mcg, Infuse  into a venous catheter. MORPHINE PUMP, Disp: , Rfl:     SUMAtriptan (IMITREX) 100 MG tablet, , Disp: , Rfl:     tamsulosin (FLOMAX) 0.4 MG capsule 24 hr capsule, Take 1 capsule by mouth Every Night., Disp: , Rfl:     topiramate (TOPAMAX) 25 MG tablet, , Disp: , Rfl:     traMADol (ULTRAM) 50 MG tablet, Take 1 tablet by mouth 3 (Three) Times a Day As Needed., Disp: , Rfl:     bisacodyl (bisacodyl) 5 MG EC tablet, Take 2 at 3pm and 2 at 7pm the day prior to colonoscopy. (Patient not taking: Reported on 5/29/2024), Disp: 4 tablet, Rfl: 0    bisacodyl (bisacodyl) 5 MG EC tablet, Take 2 at 3pm and 2 at 7pm the day prior to colonoscopy. (Patient not taking: Reported on 5/29/2024), Disp: 4 tablet, Rfl: 0    bisacodyl (bisacodyl) 5 MG EC tablet, Take 2 at 3pm and 2 at 7pm the day prior to colonoscopy. (Patient not taking: Reported on 5/29/2024), Disp: 4 tablet, Rfl: 0     "Evolocumab (Repatha SureClick) solution auto-injector SureClick injection, Repatha SureClick 140 mg/mL subcutaneous pen injector, Disp: , Rfl:     Current Facility-Administered Medications:     bupivacaine (MARCAINE) injection 5 mL, 5 mL, Injection, Once, Yael Pires APRN    OnabotulinumtoxinA 155 Units, 155 Units, Intramuscular, Once, Yael Pires APRN    Allergies:   No Known Allergies    Objective     Physical Exam:  Vital Signs:   Vitals:    05/29/24 1322   BP: 93/52   Pulse: 92   SpO2: 94%   Weight: 81.6 kg (180 lb)   Height: 172.7 cm (68\")     Body mass index is 27.37 kg/m².    Physical Exam  Vitals reviewed.   Constitutional:       General: He is not in acute distress.     Appearance: He is not toxic-appearing.   HENT:      Head: Normocephalic and atraumatic.      Mouth/Throat:      Mouth: Mucous membranes are moist.   Eyes:      Conjunctiva/sclera: Conjunctivae normal.   Cardiovascular:      Rate and Rhythm: Normal rate.      Heart sounds: Normal heart sounds.   Pulmonary:      Effort: Pulmonary effort is normal.      Breath sounds: Normal breath sounds.   Abdominal:      General: There is no distension.      Palpations: Abdomen is soft.   Musculoskeletal:         General: No swelling.      Cervical back: Neck supple.   Skin:     General: Skin is warm and dry.      Findings: No rash.   Neurological:      General: No focal deficit present.      Mental Status: He is alert and oriented to person, place, and time.   Psychiatric:         Mood and Affect: Mood normal.         Behavior: Behavior normal.     Results Review:   May 2024 chest CT scan showed patchy groundglass opacities in the right lung likely reflecting   pneumonitis. Consolidation in the right middle lobe-atelectasis versus pneumonia. Miliary pulmonary nodules in the right greater than left lungs. Favor infection. Enlarged mediastinal lymph nodes may be reactive. Small solitary pulmonary nodules.    Assessment / Plan      Assessment/Plan:  "   Diagnoses and all orders for this visit:    1. Abnormal chest CT (Primary)  -     QuantiFERON TB Gold; Future  -     Fungal Antibodies, Quantitative Double Immunodiffusion; Future  -     CT Chest Without Contrast Diagnostic; Future  -     amoxicillin-clavulanate (AUGMENTIN) 875-125 MG per tablet; Take 1 tablet by mouth 2 (Two) Times a Day. Take with food or milk  Dispense: 14 tablet; Refill: 0  Suspect that he has been aspirating. The patient was counseled on the need to use sedating medications cautiously.     2. Chronic cough  -     QuantiFERON TB Gold; Future  -     Fungal Antibodies, Quantitative Double Immunodiffusion; Future  -     Complete PFT - Pre & Post Bronchodilator; Future  Likely multifactorial.    3. Moderate persistent asthma without complication  -     albuterol sulfate  (90 Base) MCG/ACT inhaler; Inhale 2 puffs Every 4 (Four) Hours As Needed for Wheezing or Shortness of Air.  Dispense: 8 g; Refill: 5  -     budesonide-formoterol (SYMBICORT) 160-4.5 MCG/ACT inhaler; Inhale 2 puffs 2 (Two) Times a Day. Rinse mouth out after use  Dispense: 1 each; Refill: 5  -     IgE Level; Future  -     Complete PFT - Pre & Post Bronchodilator; Future  We discussed the risk and benefits of inhaled corticosteroids. Patient instructed to take them on a regular basis as prescribed. Patient instructed to rinse their mouth out after each use.     4. Allergic rhinitis, unspecified seasonality, unspecified trigger  -     IgE Level; Future  -     Allergens, Zone 8; Future       Follow Up:   Return in about 4 months (around 9/29/2024).  The patient was counseled on diagnostic results, risks and benefits of treatment options, risk factor modifications and the importance of treatment compliance. The patient was advised to contact the clinic with concerns or worsening symptoms.     MARCELLE Wong  Pulmonary Medicine [place]    This document has been electronically signed by MARCELLE Wong  May 29, 2024

## 2024-06-14 ENCOUNTER — APPOINTMENT (OUTPATIENT)
Dept: GENERAL RADIOLOGY | Facility: HOSPITAL | Age: 59
DRG: 193 | End: 2024-06-14
Attending: EMERGENCY MEDICINE
Payer: MEDICARE

## 2024-06-14 ENCOUNTER — HOSPITAL ENCOUNTER (INPATIENT)
Facility: HOSPITAL | Age: 59
LOS: 2 days | Discharge: HOME | DRG: 193 | End: 2024-06-16
Attending: EMERGENCY MEDICINE | Admitting: INTERNAL MEDICINE
Payer: MEDICARE

## 2024-06-14 DIAGNOSIS — J45.909 ASTHMA, UNSPECIFIED ASTHMA SEVERITY, UNSPECIFIED WHETHER COMPLICATED, UNSPECIFIED WHETHER PERSISTENT: ICD-10-CM

## 2024-06-14 DIAGNOSIS — R09.02 HYPOXEMIA: ICD-10-CM

## 2024-06-14 DIAGNOSIS — J18.9 PNEUMONIA OF RIGHT LOWER LOBE DUE TO INFECTIOUS ORGANISM: Primary | ICD-10-CM

## 2024-06-14 DIAGNOSIS — D50.9 IRON DEFICIENCY ANEMIA, UNSPECIFIED IRON DEFICIENCY ANEMIA TYPE: ICD-10-CM

## 2024-06-14 PROBLEM — G89.4 CHRONIC PAIN SYNDROME: Status: ACTIVE | Noted: 2024-06-14

## 2024-06-14 PROBLEM — J96.01 ACUTE RESPIRATORY FAILURE WITH HYPOXIA (HCC): Status: ACTIVE | Noted: 2024-06-14

## 2024-06-14 LAB
ALBUMIN SERPL-MCNC: 4.1 G/DL (ref 3.4–4.8)
ALBUMIN/GLOB SERPL: 1.4 {RATIO} (ref 0.8–2)
ALP SERPL-CCNC: 41 U/L (ref 25–100)
ALT SERPL-CCNC: 8 U/L (ref 4–36)
AMPHET UR QL SCN: ABNORMAL
ANION GAP SERPL CALCULATED.3IONS-SCNC: 13 MMOL/L (ref 3–16)
AST SERPL-CCNC: 12 U/L (ref 8–33)
BARBITURATES UR QL SCN: ABNORMAL
BASE EXCESS BLDA CALC-SCNC: -4.9 MMOL/L (ref -3–3)
BASOPHILS # BLD: 0.1 K/UL (ref 0–0.1)
BASOPHILS NFR BLD: 0.4 %
BENZODIAZ UR QL SCN: POSITIVE
BILIRUB SERPL-MCNC: 0.4 MG/DL (ref 0.3–1.2)
BILIRUB UR QL STRIP.AUTO: NEGATIVE
BUN SERPL-MCNC: 8 MG/DL (ref 6–20)
BUPRENORPHINE QUAL, URINE: ABNORMAL
CALCIUM SERPL-MCNC: 8.8 MG/DL (ref 8.5–10.5)
CANNABINOIDS UR QL SCN: ABNORMAL
CHLORIDE SERPL-SCNC: 102 MMOL/L (ref 98–107)
CLARITY UR: CLEAR
CO2 BLDA-SCNC: 20.5 MMOL/L (ref 24–30)
CO2 SERPL-SCNC: 20 MMOL/L (ref 20–30)
COCAINE UR QL SCN: ABNORMAL
COLOR UR: YELLOW
CREAT SERPL-MCNC: 1.4 MG/DL (ref 0.4–1.2)
DRUG SCREEN COMMENT UR-IMP: ABNORMAL
EOSINOPHIL # BLD: 0.2 K/UL (ref 0–0.4)
EOSINOPHIL NFR BLD: 1 %
ERYTHROCYTE [DISTWIDTH] IN BLOOD BY AUTOMATED COUNT: 13.8 % (ref 11–16)
FENTANYL SCREEN, URINE: NORMAL
FLUAV AG NPH QL: NEGATIVE
FLUBV AG NPH QL: NEGATIVE
GFR SERPLBLD CREATININE-BSD FMLA CKD-EPI: 58 ML/MIN/{1.73_M2}
GLOBULIN SER CALC-MCNC: 2.9 G/DL
GLUCOSE BLD-MCNC: 150 MG/DL (ref 74–106)
GLUCOSE BLD-MCNC: 198 MG/DL (ref 74–106)
GLUCOSE BLD-MCNC: 97 MG/DL (ref 74–106)
GLUCOSE SERPL-MCNC: 184 MG/DL (ref 74–106)
GLUCOSE UR STRIP.AUTO-MCNC: NEGATIVE MG/DL
HCO3 BLDA-SCNC: 19.4 MMOL/L (ref 22–26)
HCT VFR BLD AUTO: 37.1 % (ref 40–54)
HGB BLD-MCNC: 11.6 G/DL (ref 13–18)
HGB UR QL STRIP.AUTO: NEGATIVE
IMM GRANULOCYTES # BLD: 0.1 K/UL
IMM GRANULOCYTES NFR BLD: 0.4 % (ref 0–5)
INHALED O2 FLOW RATE: 0.21 %
KETONES UR STRIP.AUTO-MCNC: NEGATIVE MG/DL
LACTATE BLDV-SCNC: 1.8 MMOL/L (ref 0.4–1.9)
LEUKOCYTE ESTERASE UR QL STRIP.AUTO: NEGATIVE
LYMPHOCYTES # BLD: 0.7 K/UL (ref 1.5–4)
LYMPHOCYTES NFR BLD: 4.2 %
MCH RBC QN AUTO: 26.3 PG (ref 27–32)
MCHC RBC AUTO-ENTMCNC: 31.3 G/DL (ref 31–35)
MCV RBC AUTO: 84.1 FL (ref 80–100)
METHADONE UR QL SCN: ABNORMAL
METHAMPHET UR QL SCN: ABNORMAL
MONOCYTES # BLD: 0.6 K/UL (ref 0.2–0.8)
MONOCYTES NFR BLD: 3.4 %
NEUTROPHILS # BLD: 14.7 K/UL (ref 2–7.5)
NEUTS SEG NFR BLD: 90.6 %
NITRITE UR QL STRIP.AUTO: NEGATIVE
O2 THERAPY: ABNORMAL
OPIATES UR QL SCN: POSITIVE
OXYCODONE UR QL SCN: POSITIVE
PCO2 BLDA: 33.3 MMHG (ref 35–45)
PCP UR QL SCN: ABNORMAL
PERFORMED ON: ABNORMAL
PERFORMED ON: ABNORMAL
PERFORMED ON: NORMAL
PH BLDA: 7.38 [PH] (ref 7.35–7.45)
PH UR STRIP.AUTO: 6 [PH] (ref 5–8)
PLATELET # BLD AUTO: 288 K/UL (ref 150–400)
PMV BLD AUTO: 9.1 FL (ref 6–10)
PO2 BLDA: 52.2 MMHG (ref 80–100)
POTASSIUM SERPL-SCNC: 4.1 MMOL/L (ref 3.4–5.1)
PROCALCITONIN SERPL IA-MCNC: 1.29 NG/ML (ref 0–0.15)
PROCALCITONIN SERPL IA-MCNC: 4.6 NG/ML (ref 0–0.15)
PROT SERPL-MCNC: 7 G/DL (ref 6.4–8.3)
PROT UR STRIP.AUTO-MCNC: NEGATIVE MG/DL
RBC # BLD AUTO: 4.41 M/UL (ref 4.5–6)
SAO2 % BLDA: 85.2 %
SARS-COV-2 RDRP RESP QL NAA+PROBE: NOT DETECTED
SODIUM SERPL-SCNC: 135 MMOL/L (ref 136–145)
SP GR UR STRIP.AUTO: 1.01 (ref 1–1.03)
TRICYCLICS UR QL SCN: POSITIVE
TROPONIN, HIGH SENSITIVITY: 10 NG/L (ref 0–22)
UA COMPLETE W REFLEX CULTURE PNL UR: NORMAL
UA DIPSTICK W REFLEX MICRO PNL UR: NORMAL
URN SPEC COLLECT METH UR: NORMAL
UROBILINOGEN UR STRIP-ACNC: 0.2 E.U./DL
WBC # BLD AUTO: 16.2 K/UL (ref 4–11)

## 2024-06-14 PROCEDURE — 80053 COMPREHEN METABOLIC PANEL: CPT

## 2024-06-14 PROCEDURE — 6360000002 HC RX W HCPCS: Performed by: EMERGENCY MEDICINE

## 2024-06-14 PROCEDURE — 36600 WITHDRAWAL OF ARTERIAL BLOOD: CPT

## 2024-06-14 PROCEDURE — 94640 AIRWAY INHALATION TREATMENT: CPT

## 2024-06-14 PROCEDURE — 80307 DRUG TEST PRSMV CHEM ANLYZR: CPT

## 2024-06-14 PROCEDURE — 6370000000 HC RX 637 (ALT 250 FOR IP): Performed by: EMERGENCY MEDICINE

## 2024-06-14 PROCEDURE — 83605 ASSAY OF LACTIC ACID: CPT

## 2024-06-14 PROCEDURE — 93005 ELECTROCARDIOGRAM TRACING: CPT

## 2024-06-14 PROCEDURE — 96365 THER/PROPH/DIAG IV INF INIT: CPT

## 2024-06-14 PROCEDURE — 6360000002 HC RX W HCPCS: Performed by: PHYSICIAN ASSISTANT

## 2024-06-14 PROCEDURE — 6370000000 HC RX 637 (ALT 250 FOR IP): Performed by: PHYSICIAN ASSISTANT

## 2024-06-14 PROCEDURE — 87804 INFLUENZA ASSAY W/OPTIC: CPT

## 2024-06-14 PROCEDURE — G0480 DRUG TEST DEF 1-7 CLASSES: HCPCS

## 2024-06-14 PROCEDURE — 99285 EMERGENCY DEPT VISIT HI MDM: CPT

## 2024-06-14 PROCEDURE — 84145 PROCALCITONIN (PCT): CPT

## 2024-06-14 PROCEDURE — 82803 BLOOD GASES ANY COMBINATION: CPT

## 2024-06-14 PROCEDURE — 81003 URINALYSIS AUTO W/O SCOPE: CPT

## 2024-06-14 PROCEDURE — 2580000003 HC RX 258: Performed by: PHYSICIAN ASSISTANT

## 2024-06-14 PROCEDURE — 84484 ASSAY OF TROPONIN QUANT: CPT

## 2024-06-14 PROCEDURE — 36415 COLL VENOUS BLD VENIPUNCTURE: CPT

## 2024-06-14 PROCEDURE — 85025 COMPLETE CBC W/AUTO DIFF WBC: CPT

## 2024-06-14 PROCEDURE — 1200000000 HC SEMI PRIVATE

## 2024-06-14 PROCEDURE — 71045 X-RAY EXAM CHEST 1 VIEW: CPT

## 2024-06-14 PROCEDURE — 94669 MECHANICAL CHEST WALL OSCILL: CPT

## 2024-06-14 PROCEDURE — 94761 N-INVAS EAR/PLS OXIMETRY MLT: CPT

## 2024-06-14 PROCEDURE — 99222 1ST HOSP IP/OBS MODERATE 55: CPT | Performed by: INTERNAL MEDICINE

## 2024-06-14 PROCEDURE — 87635 SARS-COV-2 COVID-19 AMP PRB: CPT

## 2024-06-14 PROCEDURE — 87040 BLOOD CULTURE FOR BACTERIA: CPT

## 2024-06-14 PROCEDURE — 2580000003 HC RX 258: Performed by: EMERGENCY MEDICINE

## 2024-06-14 PROCEDURE — 96375 TX/PRO/DX INJ NEW DRUG ADDON: CPT

## 2024-06-14 RX ORDER — ACETAMINOPHEN 650 MG/1
650 SUPPOSITORY RECTAL EVERY 6 HOURS PRN
Status: DISCONTINUED | OUTPATIENT
Start: 2024-06-14 | End: 2024-06-16 | Stop reason: HOSPADM

## 2024-06-14 RX ORDER — SODIUM CHLORIDE 9 MG/ML
INJECTION, SOLUTION INTRAVENOUS CONTINUOUS
Status: DISCONTINUED | OUTPATIENT
Start: 2024-06-14 | End: 2024-06-14

## 2024-06-14 RX ORDER — GALCANEZUMAB 120 MG/ML
120 INJECTION, SOLUTION SUBCUTANEOUS
COMMUNITY

## 2024-06-14 RX ORDER — IBUPROFEN 600 MG/1
1 TABLET ORAL PRN
Status: DISCONTINUED | OUTPATIENT
Start: 2024-06-14 | End: 2024-06-16 | Stop reason: HOSPADM

## 2024-06-14 RX ORDER — LEVALBUTEROL INHALATION SOLUTION 1.25 MG/3ML
1.25 SOLUTION RESPIRATORY (INHALATION) EVERY 8 HOURS PRN
Status: DISCONTINUED | OUTPATIENT
Start: 2024-06-14 | End: 2024-06-16 | Stop reason: HOSPADM

## 2024-06-14 RX ORDER — ROSUVASTATIN CALCIUM 20 MG/1
20 TABLET, COATED ORAL DAILY
COMMUNITY

## 2024-06-14 RX ORDER — GUAIFENESIN 600 MG/1
600 TABLET, EXTENDED RELEASE ORAL 2 TIMES DAILY
Status: DISCONTINUED | OUTPATIENT
Start: 2024-06-14 | End: 2024-06-16 | Stop reason: HOSPADM

## 2024-06-14 RX ORDER — MORPHINE SULFATE 4 MG/ML
4 INJECTION, SOLUTION INTRAMUSCULAR; INTRAVENOUS EVERY 4 HOURS PRN
Status: DISCONTINUED | OUTPATIENT
Start: 2024-06-14 | End: 2024-06-16 | Stop reason: HOSPADM

## 2024-06-14 RX ORDER — 0.9 % SODIUM CHLORIDE 0.9 %
30 INTRAVENOUS SOLUTION INTRAVENOUS ONCE
Status: COMPLETED | OUTPATIENT
Start: 2024-06-14 | End: 2024-06-14

## 2024-06-14 RX ORDER — INSULIN LISPRO 100 [IU]/ML
0-4 INJECTION, SOLUTION INTRAVENOUS; SUBCUTANEOUS
Status: DISCONTINUED | OUTPATIENT
Start: 2024-06-14 | End: 2024-06-16 | Stop reason: HOSPADM

## 2024-06-14 RX ORDER — BUPROPION HYDROCHLORIDE 75 MG/1
75 TABLET ORAL 2 TIMES DAILY
Status: DISCONTINUED | OUTPATIENT
Start: 2024-06-14 | End: 2024-06-16 | Stop reason: HOSPADM

## 2024-06-14 RX ORDER — PROPRANOLOL HYDROCHLORIDE 20 MG/1
20 TABLET ORAL 2 TIMES DAILY
Status: DISCONTINUED | OUTPATIENT
Start: 2024-06-14 | End: 2024-06-16 | Stop reason: HOSPADM

## 2024-06-14 RX ORDER — ASPIRIN 81 MG/1
81 TABLET ORAL DAILY
Status: DISCONTINUED | OUTPATIENT
Start: 2024-06-14 | End: 2024-06-16 | Stop reason: HOSPADM

## 2024-06-14 RX ORDER — ONDANSETRON 2 MG/ML
4 INJECTION INTRAMUSCULAR; INTRAVENOUS ONCE
Status: COMPLETED | OUTPATIENT
Start: 2024-06-14 | End: 2024-06-14

## 2024-06-14 RX ORDER — ACETAMINOPHEN 500 MG
1000 TABLET ORAL
Status: COMPLETED | OUTPATIENT
Start: 2024-06-14 | End: 2024-06-14

## 2024-06-14 RX ORDER — INSULIN LISPRO 100 [IU]/ML
0-4 INJECTION, SOLUTION INTRAVENOUS; SUBCUTANEOUS NIGHTLY
Status: DISCONTINUED | OUTPATIENT
Start: 2024-06-14 | End: 2024-06-16 | Stop reason: HOSPADM

## 2024-06-14 RX ORDER — ONDANSETRON 2 MG/ML
4 INJECTION INTRAMUSCULAR; INTRAVENOUS EVERY 6 HOURS PRN
Status: DISCONTINUED | OUTPATIENT
Start: 2024-06-14 | End: 2024-06-16 | Stop reason: HOSPADM

## 2024-06-14 RX ORDER — ENOXAPARIN SODIUM 100 MG/ML
40 INJECTION SUBCUTANEOUS DAILY
Status: DISCONTINUED | OUTPATIENT
Start: 2024-06-14 | End: 2024-06-16 | Stop reason: HOSPADM

## 2024-06-14 RX ORDER — AZITHROMYCIN 250 MG/1
250 TABLET, FILM COATED ORAL DAILY
Status: DISCONTINUED | OUTPATIENT
Start: 2024-06-15 | End: 2024-06-16 | Stop reason: HOSPADM

## 2024-06-14 RX ORDER — DULOXETIN HYDROCHLORIDE 30 MG/1
60 CAPSULE, DELAYED RELEASE ORAL DAILY
Status: DISCONTINUED | OUTPATIENT
Start: 2024-06-14 | End: 2024-06-16 | Stop reason: HOSPADM

## 2024-06-14 RX ORDER — ONDANSETRON 4 MG/1
4 TABLET, ORALLY DISINTEGRATING ORAL EVERY 8 HOURS PRN
Status: DISCONTINUED | OUTPATIENT
Start: 2024-06-14 | End: 2024-06-16 | Stop reason: HOSPADM

## 2024-06-14 RX ORDER — MORPHINE SULFATE/0.9% NACL/PF 1 MG/ML
SYRINGE (ML) INJECTION CONTINUOUS
Status: DISCONTINUED | OUTPATIENT
Start: 2024-06-14 | End: 2024-06-14

## 2024-06-14 RX ORDER — CYCLOBENZAPRINE HCL 10 MG
10 TABLET ORAL 3 TIMES DAILY PRN
Status: DISCONTINUED | OUTPATIENT
Start: 2024-06-14 | End: 2024-06-16 | Stop reason: HOSPADM

## 2024-06-14 RX ORDER — ACETAMINOPHEN 325 MG/1
650 TABLET ORAL EVERY 6 HOURS PRN
Status: DISCONTINUED | OUTPATIENT
Start: 2024-06-14 | End: 2024-06-16 | Stop reason: HOSPADM

## 2024-06-14 RX ORDER — DEXTROSE MONOHYDRATE 100 MG/ML
INJECTION, SOLUTION INTRAVENOUS CONTINUOUS PRN
Status: DISCONTINUED | OUTPATIENT
Start: 2024-06-14 | End: 2024-06-16 | Stop reason: HOSPADM

## 2024-06-14 RX ORDER — MORPHINE SULFATE 4 MG/ML
4 INJECTION, SOLUTION INTRAMUSCULAR; INTRAVENOUS
Status: COMPLETED | OUTPATIENT
Start: 2024-06-14 | End: 2024-06-14

## 2024-06-14 RX ORDER — ALBUTEROL SULFATE 90 UG/1
2 AEROSOL, METERED RESPIRATORY (INHALATION) EVERY 4 HOURS PRN
COMMUNITY

## 2024-06-14 RX ORDER — AZITHROMYCIN 250 MG/1
500 TABLET, FILM COATED ORAL DAILY
Status: COMPLETED | OUTPATIENT
Start: 2024-06-14 | End: 2024-06-14

## 2024-06-14 RX ORDER — GUAIFENESIN/DEXTROMETHORPHAN 100-10MG/5
5 SYRUP ORAL EVERY 4 HOURS PRN
Status: DISCONTINUED | OUTPATIENT
Start: 2024-06-14 | End: 2024-06-16 | Stop reason: HOSPADM

## 2024-06-14 RX ORDER — SUMATRIPTAN 50 MG/1
50 TABLET, FILM COATED ORAL
Status: COMPLETED | OUTPATIENT
Start: 2024-06-14 | End: 2024-06-14

## 2024-06-14 RX ORDER — POLYETHYLENE GLYCOL 3350 17 G/17G
17 POWDER, FOR SOLUTION ORAL DAILY PRN
Status: DISCONTINUED | OUTPATIENT
Start: 2024-06-14 | End: 2024-06-16 | Stop reason: HOSPADM

## 2024-06-14 RX ORDER — SODIUM CHLORIDE 9 MG/ML
INJECTION, SOLUTION INTRAVENOUS CONTINUOUS
Status: DISCONTINUED | OUTPATIENT
Start: 2024-06-14 | End: 2024-06-15

## 2024-06-14 RX ORDER — BUDESONIDE 0.5 MG/2ML
0.5 INHALANT ORAL
Status: DISCONTINUED | OUTPATIENT
Start: 2024-06-14 | End: 2024-06-16 | Stop reason: HOSPADM

## 2024-06-14 RX ORDER — ORAL SEMAGLUTIDE 7 MG/1
7 TABLET ORAL DAILY
COMMUNITY

## 2024-06-14 RX ADMIN — ENOXAPARIN SODIUM 40 MG: 100 INJECTION SUBCUTANEOUS at 12:44

## 2024-06-14 RX ADMIN — ACETAMINOPHEN 1000 MG: 500 TABLET ORAL at 07:39

## 2024-06-14 RX ADMIN — METFORMIN HYDROCHLORIDE 1000 MG: 500 TABLET ORAL at 17:48

## 2024-06-14 RX ADMIN — MORPHINE SULFATE 4 MG: 4 INJECTION INTRAVENOUS at 17:51

## 2024-06-14 RX ADMIN — SODIUM CHLORIDE: 9 INJECTION, SOLUTION INTRAVENOUS at 20:53

## 2024-06-14 RX ADMIN — MORPHINE SULFATE 4 MG: 4 INJECTION INTRAVENOUS at 08:35

## 2024-06-14 RX ADMIN — MORPHINE SULFATE 4 MG: 4 INJECTION INTRAVENOUS at 12:43

## 2024-06-14 RX ADMIN — BUDESONIDE 500 MCG: 0.5 INHALANT RESPIRATORY (INHALATION) at 17:05

## 2024-06-14 RX ADMIN — ONDANSETRON 4 MG: 2 INJECTION INTRAMUSCULAR; INTRAVENOUS at 08:35

## 2024-06-14 RX ADMIN — CEFTRIAXONE 1000 MG: 1 INJECTION, POWDER, FOR SOLUTION INTRAMUSCULAR; INTRAVENOUS at 08:39

## 2024-06-14 RX ADMIN — GUAIFENESIN 600 MG: 600 TABLET ORAL at 20:55

## 2024-06-14 RX ADMIN — CYCLOBENZAPRINE 10 MG: 10 TABLET, FILM COATED ORAL at 12:42

## 2024-06-14 RX ADMIN — GUAIFENESIN 600 MG: 600 TABLET ORAL at 12:43

## 2024-06-14 RX ADMIN — SUMATRIPTAN SUCCINATE 50 MG: 50 TABLET ORAL at 12:42

## 2024-06-14 RX ADMIN — SODIUM CHLORIDE 2448 ML: 9 INJECTION, SOLUTION INTRAVENOUS at 07:38

## 2024-06-14 RX ADMIN — CYCLOBENZAPRINE 10 MG: 10 TABLET, FILM COATED ORAL at 20:56

## 2024-06-14 RX ADMIN — AZITHROMYCIN DIHYDRATE 500 MG: 250 TABLET ORAL at 12:43

## 2024-06-14 RX ADMIN — SODIUM CHLORIDE: 9 INJECTION, SOLUTION INTRAVENOUS at 12:29

## 2024-06-14 RX ADMIN — BUPROPION HYDROCHLORIDE 75 MG: 75 TABLET, FILM COATED ORAL at 20:55

## 2024-06-14 RX ADMIN — MORPHINE SULFATE 4 MG: 4 INJECTION INTRAVENOUS at 22:13

## 2024-06-14 RX ADMIN — PROPRANOLOL HYDROCHLORIDE 20 MG: 20 TABLET ORAL at 20:55

## 2024-06-14 RX ADMIN — SODIUM CHLORIDE: 9 INJECTION, SOLUTION INTRAVENOUS at 10:50

## 2024-06-14 ASSESSMENT — PAIN SCALES - GENERAL
PAINLEVEL_OUTOF10: 7
PAINLEVEL_OUTOF10: 10
PAINLEVEL_OUTOF10: 9
PAINLEVEL_OUTOF10: 8
PAINLEVEL_OUTOF10: 9
PAINLEVEL_OUTOF10: 0

## 2024-06-14 ASSESSMENT — PAIN DESCRIPTION - LOCATION
LOCATION: BACK
LOCATION: BACK;HEAD
LOCATION: HEAD;NECK;BACK

## 2024-06-14 ASSESSMENT — PAIN - FUNCTIONAL ASSESSMENT: PAIN_FUNCTIONAL_ASSESSMENT: 0-10

## 2024-06-14 ASSESSMENT — PAIN DESCRIPTION - DESCRIPTORS: DESCRIPTORS: ACHING

## 2024-06-14 NOTE — PLAN OF CARE
Problem: Discharge Planning  Goal: Discharge to home or other facility with appropriate resources  Outcome: Progressing  Flowsheets (Taken 6/14/2024 1251)  Discharge to home or other facility with appropriate resources: Identify barriers to discharge with patient and caregiver     Problem: Pain  Goal: Verbalizes/displays adequate comfort level or baseline comfort level  Outcome: Progressing     Problem: Safety - Adult  Goal: Free from fall injury  Outcome: Progressing

## 2024-06-14 NOTE — ED TRIAGE NOTES
Pt c/o headache, has hx of migraines and stated this is similar. Pt also has feverbut has taken no meds for this. Pt has had dx of pneumonia approx 2 weeks ago and has been on numerous abx. Pt states he is nauseous but no emesis.

## 2024-06-14 NOTE — ED NOTES
Received Bedside report from Dayana DYE. Pt arrived with complaints of migraine, found to have temp of 103, 84-88% on RA, HR 120s.  Verbal form Travis Tomas to notify respiratory for ABG on RA. RTT notified. Required 3LNC before maintaining >90%.  Pt reports that he was been treated 3 times recently for Pnuemonia

## 2024-06-14 NOTE — ED PROVIDER NOTES
.        [facility]  EMERGENCY DEPARTMENT ENCOUNTER        Pt Name: Tato Thornton  MRN: [id number]  Birthdate 1965  Date of evaluation: 6/14/2024  Provider: Cher Robles MD  PCP: Kimmie Bradley PA-C  Note Started: 7:20 AM EDT 6/14/24    CHIEF COMPLAINT       Chief Complaint   Patient presents with    Headache    Fever    Nausea       HISTORY OF PRESENT ILLNESS: 1 or more Elements     History from : Patient    Limitations to history : None    Tato Thornton is a 58 y.o. male who presents complaining that he was diagnosed with pneumonia couple weeks ago he is actually had 3 different courses of antibiotics and is never completely gone well he presents today complaining of an episode of passing out after mowing the yard he is short of breath says spiked a temp up to 103.1 here this morning they did not take his temperature at home he has not had any medications for this at this time he does state that he gets more short of breath on exertion he has cough which is productive of some clear sputum has been wheezing has had some substernal chest pain which comes and goes.  Patient denies COPD or asthma but he is on inhalers at home.    Nursing Notes were all reviewed and agreed with or any disagreements were addressed in the HPI.    REVIEW OF SYSTEMS :      Review of Systems     systems reviewed and negative except as in HPI/MDM    SURGICAL HISTORY     Past Surgical History:   Procedure Laterality Date    COLONOSCOPY N/A 10/29/2020    COLORECTAL CANCER SCREENING, NOT HIGH RISK performed by Alexis Montes MD at [facility] ENDOSCOPY    INFUSION PUMP IMPLANTATION  03/2023    morphine pain pump    SKIN CANCER EXCISION      back    TONSILLECTOMY      UPPER GASTROINTESTINAL ENDOSCOPY N/A 10/01/2020    EGD BIOPSY performed by Alexis Montes MD at [facility] ENDOSCOPY       CURRENTMEDICATIONS       Previous Medications    ALPRAZOLAM (XANAX) 0.25 MG TABLET    Take 1 tablet by mouth nightly as

## 2024-06-14 NOTE — PROGRESS NOTES
4 Eyes Skin Assessment     NAME:  Tato Thornton  YOB: 1965  MEDICAL RECORD NUMBER:  [id number]    The patient is being assessed for  Admission    I agree that at least one RN has performed a thorough Head to Toe Skin Assessment on the patient. ALL assessment sites listed below have been assessed.      Areas assessed by both nurses:    Head, Face, Ears, Shoulders, Back, Chest, Arms, Elbows, Hands, Sacrum. Buttock, Coccyx, Ischium, and Legs. Feet and Heels        Does the Patient have a Wound? No noted wound(s)       Caesar Prevention initiated by RN: No  Wound Care Orders initiated by RN: No    Pressure Injury (Stage 3,4, Unstageable, DTI, NWPT, and Complex wounds) if present, place Wound referral order by RN under : No    New Ostomies, if present place, Ostomy referral order under : No     Nurse 1 eSignature: Electronically signed by Libby Portillo RN on 6/14/24 at 1:39 PM EDT    **SHARE this note so that the co-signing nurse can place an eSignature**    Nurse 2 eSignature: Electronically signed by Nelly Valenzuela RN on 6/14/24 at 4:06 PM EDT

## 2024-06-14 NOTE — H&P
result and monitor closely.   06/14/2024    CAP (community acquired pneumonia) [J18.9]  Antibiotics, oxygen, neb treatment and pulmonary toileting.  Follow culture results.   06/14/2024    Chronic pain syndrome [G89.4]  Pain control and monitor.   06/14/2024    Type 2 diabetes mellitus (HCC) [E11.9]  Continue current regimen and monitor.  Cover with sign scale insulin if needed.  Discussed appropriate diet and the importance of weight control.   05/20/2016    Essential hypertension [I10]  Blood pressure on the low side.  Likely due to his acute presentation.  Hold meds and monitor closely.  Support with IV fluid if needed.   05/20/2016         Monica Acosta MD certifies per CMS regulation for 42 .15(a), that the patient may reasonably be expected to be discharged or transferred to a hospital within 96 hours after admission to [facility]      Electronically signed by Monica Acosta MD on 6/14/2024 at 10:21 PM

## 2024-06-14 NOTE — PROGRESS NOTES
Medication Reconciliation completed with fill history from [facility] and patient interview. Patient is receiving morphine from an infusion center. That med history is still pending.   -not taking Ketorolac 10 mg  -patient is taking Alprazolam 0.25 mg bid prn  -patient does have a supply of Propanalol 20 mg bid. He last received a 90 day supply of the 40 mg and has been breaking them in half which has caused him to have a surplus of medication and not filling them as often at the pharmacy.  -changed Sumatriptian 50 mg prn  mg prn  -added Albuterol inhaler 2 p q4h prn  -added Rybelsus 7 mg qd   -added Rosuvastatin 20 mg qd  -added Emgality 120 mg/ ml [id number]    Of note: patient had filled Symbicort 160/4.5 2 p bid on 5/29/24 but stated he doesn't think he's taking that because it doesn't sound familiar.

## 2024-06-14 NOTE — ED NOTES
Bedside report given to Libby DYE. During BS report pt reports that he recalled he also had 4 infected teeth removed prior week. Libby advises she will up  provider.   Pt departed to  via WC, no distress noted.
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
## 2024-06-15 PROBLEM — D64.9 ANEMIA: Status: ACTIVE | Noted: 2024-06-15

## 2024-06-15 LAB
ALBUMIN SERPL-MCNC: 3.2 G/DL (ref 3.4–4.8)
ALBUMIN/GLOB SERPL: 1.3 {RATIO} (ref 0.8–2)
ALP SERPL-CCNC: 33 U/L (ref 25–100)
ALT SERPL-CCNC: 6 U/L (ref 4–36)
ANION GAP SERPL CALCULATED.3IONS-SCNC: 11 MMOL/L (ref 3–16)
AST SERPL-CCNC: 9 U/L (ref 8–33)
BASE EXCESS BLDA CALC-SCNC: -6.9 MMOL/L (ref -3–3)
BILIRUB SERPL-MCNC: <0.2 MG/DL (ref 0.3–1.2)
BUN SERPL-MCNC: 6 MG/DL (ref 6–20)
CALCIUM SERPL-MCNC: 7.8 MG/DL (ref 8.5–10.5)
CHLORIDE SERPL-SCNC: 108 MMOL/L (ref 98–107)
CO2 BLDA-SCNC: 20 MMOL/L (ref 24–30)
CO2 SERPL-SCNC: 21 MMOL/L (ref 20–30)
CREAT SERPL-MCNC: 0.9 MG/DL (ref 0.4–1.2)
ERYTHROCYTE [DISTWIDTH] IN BLOOD BY AUTOMATED COUNT: 14 % (ref 11–16)
FERRITIN SERPL IA-MCNC: 96 NG/ML (ref 22–322)
FOLATE SERPL-MCNC: >20 NG/ML
GFR SERPLBLD CREATININE-BSD FMLA CKD-EPI: >90 ML/MIN/{1.73_M2}
GLOBULIN SER CALC-MCNC: 2.5 G/DL
GLUCOSE BLD-MCNC: 137 MG/DL (ref 74–106)
GLUCOSE BLD-MCNC: 140 MG/DL (ref 74–106)
GLUCOSE BLD-MCNC: 144 MG/DL (ref 74–106)
GLUCOSE BLD-MCNC: 186 MG/DL (ref 74–106)
GLUCOSE SERPL-MCNC: 111 MG/DL (ref 74–106)
HCO3 BLDA-SCNC: 18.8 MMOL/L (ref 22–26)
HCT VFR BLD AUTO: 28 % (ref 40–54)
HGB BLD-MCNC: 8.8 G/DL (ref 13–18)
INHALED O2 FLOW RATE: 0.32 %
IRON SATN MFR SERPL: 5 % (ref 20–50)
IRON SERPL-MCNC: 12 UG/DL (ref 59–158)
MCH RBC QN AUTO: 26.5 PG (ref 27–32)
MCHC RBC AUTO-ENTMCNC: 31.4 G/DL (ref 31–35)
MCV RBC AUTO: 84.3 FL (ref 80–100)
O2 THERAPY: ABNORMAL
PCO2 BLDA: 38.4 MMHG (ref 35–45)
PERFORMED ON: ABNORMAL
PH BLDA: 7.31 [PH] (ref 7.35–7.45)
PLATELET # BLD AUTO: 220 K/UL (ref 150–400)
PMV BLD AUTO: 9.1 FL (ref 6–10)
PO2 BLDA: 79 MMHG (ref 80–100)
POTASSIUM SERPL-SCNC: 3.8 MMOL/L (ref 3.4–5.1)
PROCALCITONIN SERPL IA-MCNC: 4.59 NG/ML (ref 0–0.15)
PROT SERPL-MCNC: 5.7 G/DL (ref 6.4–8.3)
RBC # BLD AUTO: 3.32 M/UL (ref 4.5–6)
SAO2 % BLDA: 94.7 %
SODIUM SERPL-SCNC: 140 MMOL/L (ref 136–145)
TIBC SERPL-MCNC: 240 UG/DL (ref 250–450)
VIT B12 SERPL-MCNC: 1792 PG/ML (ref 211–911)
WBC # BLD AUTO: 8.7 K/UL (ref 4–11)

## 2024-06-15 PROCEDURE — 2580000003 HC RX 258: Performed by: PHYSICIAN ASSISTANT

## 2024-06-15 PROCEDURE — 36600 WITHDRAWAL OF ARTERIAL BLOOD: CPT

## 2024-06-15 PROCEDURE — 99232 SBSQ HOSP IP/OBS MODERATE 35: CPT | Performed by: INTERNAL MEDICINE

## 2024-06-15 PROCEDURE — 85027 COMPLETE CBC AUTOMATED: CPT

## 2024-06-15 PROCEDURE — 83540 ASSAY OF IRON: CPT

## 2024-06-15 PROCEDURE — 94669 MECHANICAL CHEST WALL OSCILL: CPT

## 2024-06-15 PROCEDURE — 82746 ASSAY OF FOLIC ACID SERUM: CPT

## 2024-06-15 PROCEDURE — 80053 COMPREHEN METABOLIC PANEL: CPT

## 2024-06-15 PROCEDURE — 83550 IRON BINDING TEST: CPT

## 2024-06-15 PROCEDURE — 84145 PROCALCITONIN (PCT): CPT

## 2024-06-15 PROCEDURE — 82803 BLOOD GASES ANY COMBINATION: CPT

## 2024-06-15 PROCEDURE — 82607 VITAMIN B-12: CPT

## 2024-06-15 PROCEDURE — 1200000000 HC SEMI PRIVATE

## 2024-06-15 PROCEDURE — 6360000002 HC RX W HCPCS: Performed by: PHYSICIAN ASSISTANT

## 2024-06-15 PROCEDURE — 6370000000 HC RX 637 (ALT 250 FOR IP): Performed by: PHYSICIAN ASSISTANT

## 2024-06-15 PROCEDURE — 82728 ASSAY OF FERRITIN: CPT

## 2024-06-15 PROCEDURE — 94640 AIRWAY INHALATION TREATMENT: CPT

## 2024-06-15 PROCEDURE — 94761 N-INVAS EAR/PLS OXIMETRY MLT: CPT

## 2024-06-15 PROCEDURE — 36415 COLL VENOUS BLD VENIPUNCTURE: CPT

## 2024-06-15 RX ORDER — FERROUS SULFATE 324(65)MG
324 TABLET, DELAYED RELEASE (ENTERIC COATED) ORAL
Status: DISCONTINUED | OUTPATIENT
Start: 2024-06-16 | End: 2024-06-16 | Stop reason: HOSPADM

## 2024-06-15 RX ORDER — POTASSIUM CHLORIDE 750 MG/1
20 CAPSULE, EXTENDED RELEASE ORAL ONCE
Status: COMPLETED | OUTPATIENT
Start: 2024-06-15 | End: 2024-06-15

## 2024-06-15 RX ADMIN — MORPHINE SULFATE 4 MG: 4 INJECTION INTRAVENOUS at 03:26

## 2024-06-15 RX ADMIN — CEFTRIAXONE 1000 MG: 1 INJECTION, POWDER, FOR SOLUTION INTRAMUSCULAR; INTRAVENOUS at 08:55

## 2024-06-15 RX ADMIN — SODIUM CHLORIDE: 9 INJECTION, SOLUTION INTRAVENOUS at 06:26

## 2024-06-15 RX ADMIN — MORPHINE SULFATE 4 MG: 4 INJECTION INTRAVENOUS at 17:23

## 2024-06-15 RX ADMIN — ACETAMINOPHEN 650 MG: 325 TABLET, FILM COATED ORAL at 15:40

## 2024-06-15 RX ADMIN — BUDESONIDE 500 MCG: 0.5 INHALANT RESPIRATORY (INHALATION) at 16:46

## 2024-06-15 RX ADMIN — ENOXAPARIN SODIUM 40 MG: 100 INJECTION SUBCUTANEOUS at 08:42

## 2024-06-15 RX ADMIN — ASPIRIN 81 MG: 81 TABLET, COATED ORAL at 08:42

## 2024-06-15 RX ADMIN — CYCLOBENZAPRINE 10 MG: 10 TABLET, FILM COATED ORAL at 21:13

## 2024-06-15 RX ADMIN — GUAIFENESIN 600 MG: 600 TABLET ORAL at 08:42

## 2024-06-15 RX ADMIN — PROPRANOLOL HYDROCHLORIDE 20 MG: 20 TABLET ORAL at 08:42

## 2024-06-15 RX ADMIN — AZITHROMYCIN DIHYDRATE 250 MG: 250 TABLET, FILM COATED ORAL at 08:42

## 2024-06-15 RX ADMIN — BUPROPION HYDROCHLORIDE 75 MG: 75 TABLET, FILM COATED ORAL at 21:13

## 2024-06-15 RX ADMIN — MORPHINE SULFATE 4 MG: 4 INJECTION INTRAVENOUS at 08:50

## 2024-06-15 RX ADMIN — DULOXETINE HYDROCHLORIDE 60 MG: 30 CAPSULE, DELAYED RELEASE ORAL at 08:42

## 2024-06-15 RX ADMIN — PROPRANOLOL HYDROCHLORIDE 20 MG: 20 TABLET ORAL at 21:13

## 2024-06-15 RX ADMIN — METFORMIN HYDROCHLORIDE 1000 MG: 500 TABLET ORAL at 17:10

## 2024-06-15 RX ADMIN — METFORMIN HYDROCHLORIDE 1000 MG: 500 TABLET ORAL at 08:41

## 2024-06-15 RX ADMIN — MORPHINE SULFATE 4 MG: 4 INJECTION INTRAVENOUS at 13:23

## 2024-06-15 RX ADMIN — CYCLOBENZAPRINE 10 MG: 10 TABLET, FILM COATED ORAL at 15:37

## 2024-06-15 RX ADMIN — BUDESONIDE 500 MCG: 0.5 INHALANT RESPIRATORY (INHALATION) at 05:00

## 2024-06-15 RX ADMIN — BUPROPION HYDROCHLORIDE 75 MG: 75 TABLET, FILM COATED ORAL at 08:42

## 2024-06-15 RX ADMIN — POTASSIUM CHLORIDE 20 MEQ: 750 CAPSULE, EXTENDED RELEASE ORAL at 08:42

## 2024-06-15 RX ADMIN — GUAIFENESIN 600 MG: 600 TABLET ORAL at 21:13

## 2024-06-15 ASSESSMENT — PAIN DESCRIPTION - DESCRIPTORS
DESCRIPTORS: THROBBING
DESCRIPTORS: ACHING

## 2024-06-15 ASSESSMENT — PAIN DESCRIPTION - ORIENTATION
ORIENTATION: LEFT
ORIENTATION: LOWER;LEFT
ORIENTATION: LEFT
ORIENTATION: LOWER;LEFT

## 2024-06-15 ASSESSMENT — PAIN DESCRIPTION - LOCATION
LOCATION: BACK
LOCATION: BACK;LEG

## 2024-06-15 ASSESSMENT — PAIN SCALES - GENERAL
PAINLEVEL_OUTOF10: 10
PAINLEVEL_OUTOF10: 0
PAINLEVEL_OUTOF10: 7
PAINLEVEL_OUTOF10: 9
PAINLEVEL_OUTOF10: 4
PAINLEVEL_OUTOF10: 6
PAINLEVEL_OUTOF10: 10
PAINLEVEL_OUTOF10: 6
PAINLEVEL_OUTOF10: 10
PAINLEVEL_OUTOF10: 10
PAINLEVEL_OUTOF10: 5

## 2024-06-15 NOTE — CARDIO/PULMONARY
Patient currently on 4 lpm with SpO2 at 100%. Patient is on room air at home.    Will decreased patient to 3 lpm.    Will continue to monitor

## 2024-06-15 NOTE — PLAN OF CARE
Problem: Discharge Planning  Goal: Discharge to home or other facility with appropriate resources  6/14/2024 2229 by Lawanda Agosto RN  Outcome: Progressing  6/14/2024 1316 by Libby Portillo RN  Outcome: Progressing  Flowsheets (Taken 6/14/2024 1259)  Discharge to home or other facility with appropriate resources: Identify barriers to discharge with patient and caregiver     Problem: Pain  Goal: Verbalizes/displays adequate comfort level or baseline comfort level  6/14/2024 2229 by Lawanda Agosto, RN  Outcome: Progressing  6/14/2024 1316 by Libby Portillo, RN  Outcome: Progressing     Problem: Safety - Adult  Goal: Free from fall injury  6/14/2024 2229 by Lawanda Agosto RN  Outcome: Progressing  6/14/2024 1316 by Libby Portillo, RN  Outcome: Progressing

## 2024-06-15 NOTE — PROGRESS NOTES
Pt. Complaining of chronic back pain rated at 10 - lower left aching prn flexeril and tylenol given  pt readjusted in bed-will monitor

## 2024-06-15 NOTE — PROGRESS NOTES
Progress Note      Subjective:   Chief complaint: SOB, AMS, fever    Interval History:     6/15: Saw patient today.  No acute distress, nontoxic-appearing.  Denies any acute complaints.  Last bowel movement was today.  Will try to wean off oxygen today.  He still on 3 L O2 via nasal cannula.  Did have acute drop in H&H.  H&H was 8 and 28 on labs today.  Denies any melena, bloody stool, hematemesis or other sources of bleeding.    The patient is a 58 y.o. male with a h/o HTN, DM2, HLD. Pt states he has had 1 week of cough, stuffy/runny nose. He was mowing his mothers grass yesterday and became SOB with a  possible LOC. He was able to crawl to get his mother. Since last night he has been having a fever up to 103. He has a h/o asthma and tried using his rescue inhaler without relief. He is not on O2 at baseline. He currently denies any cp. No EtOH or recreational drugs. On presentation to the ER he was found to have an O2 of 84%. CXR showed bilateral pulmonary edema vs PNA. He was placed on 4L O2. He was started on rocephin in the ER.     Review of systems:   Constitutional:  + fever or chills   Eyes:  Denies eye pain or redness  HENT:  + nasal congestion and rhinorrhea, no sore throat   Respiratory:  + cough or shortness of breath   Cardiovascular:  Denies chest pain or edema   GI:  Denies abdominal pain, nausea, vomiting, bloody stools or diarrhea   :  Denies dysuria or frequency  Musculoskeletal:  Denies acute neck pain or body aches  Integument:  Denies rash or itching  Neurologic:  Denies headache, dizziness, numbness, tingling or unilateral weakness  Psychiatric:  Denies acute depression or acute anxiety       Past medical history, surgical history, family history and social history reviewed and unchanged compared to H&P earlier this admission.    Medications:   Scheduled Meds:   aspirin  81 mg Oral Daily    buPROPion  75 mg Oral BID    DULoxetine  60 mg Oral Daily    insulin lispro  0-4 Units SubCUTAneous TID

## 2024-06-15 NOTE — PLAN OF CARE
Problem: Respiratory - Adult  Goal: Achieves optimal ventilation and oxygenation  Outcome: Progressing     Problem: Safety - Adult  Goal: Free from fall injury  6/15/2024 1012 by Isis Reddy RN  Outcome: Progressing     Problem: Safety - Adult  Goal: Free from fall injury  6/15/2024 1012 by Isis Reddy, RN  Outcome: Progressing

## 2024-06-16 VITALS
SYSTOLIC BLOOD PRESSURE: 116 MMHG | DIASTOLIC BLOOD PRESSURE: 70 MMHG | OXYGEN SATURATION: 98 % | BODY MASS INDEX: 27 KG/M2 | WEIGHT: 178.13 LBS | TEMPERATURE: 97.7 F | HEIGHT: 68 IN | HEART RATE: 70 BPM | RESPIRATION RATE: 16 BRPM

## 2024-06-16 LAB
ALBUMIN SERPL-MCNC: 3.2 G/DL (ref 3.4–4.8)
ALBUMIN/GLOB SERPL: 1.2 {RATIO} (ref 0.8–2)
ALP SERPL-CCNC: 32 U/L (ref 25–100)
ALT SERPL-CCNC: 6 U/L (ref 4–36)
ANION GAP SERPL CALCULATED.3IONS-SCNC: 9 MMOL/L (ref 3–16)
AST SERPL-CCNC: 8 U/L (ref 8–33)
BASOPHILS # BLD: 0 K/UL (ref 0–0.1)
BASOPHILS NFR BLD: 0.6 %
BILIRUB SERPL-MCNC: <0.2 MG/DL (ref 0.3–1.2)
BUN SERPL-MCNC: 5 MG/DL (ref 6–20)
CALCIUM SERPL-MCNC: 8.6 MG/DL (ref 8.5–10.5)
CHLORIDE SERPL-SCNC: 109 MMOL/L (ref 98–107)
CO2 SERPL-SCNC: 24 MMOL/L (ref 20–30)
CREAT SERPL-MCNC: 1 MG/DL (ref 0.4–1.2)
EOSINOPHIL # BLD: 0.2 K/UL (ref 0–0.4)
EOSINOPHIL NFR BLD: 3.1 %
ERYTHROCYTE [DISTWIDTH] IN BLOOD BY AUTOMATED COUNT: 14.1 % (ref 11–16)
GFR SERPLBLD CREATININE-BSD FMLA CKD-EPI: 86 ML/MIN/{1.73_M2}
GLOBULIN SER CALC-MCNC: 2.7 G/DL
GLUCOSE BLD-MCNC: 126 MG/DL (ref 74–106)
GLUCOSE BLD-MCNC: 126 MG/DL (ref 74–106)
GLUCOSE SERPL-MCNC: 124 MG/DL (ref 74–106)
HCT VFR BLD AUTO: 29.3 % (ref 40–54)
HGB BLD-MCNC: 9 G/DL (ref 13–18)
IMM GRANULOCYTES # BLD: 0 K/UL
IMM GRANULOCYTES NFR BLD: 0.2 % (ref 0–5)
LYMPHOCYTES # BLD: 1.2 K/UL (ref 1.5–4)
LYMPHOCYTES NFR BLD: 22.9 %
MCH RBC QN AUTO: 26.2 PG (ref 27–32)
MCHC RBC AUTO-ENTMCNC: 30.7 G/DL (ref 31–35)
MCV RBC AUTO: 85.2 FL (ref 80–100)
MONOCYTES # BLD: 0.3 K/UL (ref 0.2–0.8)
MONOCYTES NFR BLD: 6.6 %
NEUTROPHILS # BLD: 3.4 K/UL (ref 2–7.5)
NEUTS SEG NFR BLD: 66.6 %
PERFORMED ON: ABNORMAL
PERFORMED ON: ABNORMAL
PLATELET # BLD AUTO: 224 K/UL (ref 150–400)
PMV BLD AUTO: 9.3 FL (ref 6–10)
POTASSIUM SERPL-SCNC: 4.2 MMOL/L (ref 3.4–5.1)
PROT SERPL-MCNC: 5.9 G/DL (ref 6.4–8.3)
RBC # BLD AUTO: 3.44 M/UL (ref 4.5–6)
SODIUM SERPL-SCNC: 142 MMOL/L (ref 136–145)
WBC # BLD AUTO: 5.2 K/UL (ref 4–11)

## 2024-06-16 PROCEDURE — 2580000003 HC RX 258: Performed by: PHYSICIAN ASSISTANT

## 2024-06-16 PROCEDURE — 85025 COMPLETE CBC W/AUTO DIFF WBC: CPT

## 2024-06-16 PROCEDURE — 6370000000 HC RX 637 (ALT 250 FOR IP): Performed by: PHYSICIAN ASSISTANT

## 2024-06-16 PROCEDURE — 99238 HOSP IP/OBS DSCHRG MGMT 30/<: CPT | Performed by: PHYSICIAN ASSISTANT

## 2024-06-16 PROCEDURE — 6360000002 HC RX W HCPCS: Performed by: PHYSICIAN ASSISTANT

## 2024-06-16 PROCEDURE — 94618 PULMONARY STRESS TESTING: CPT

## 2024-06-16 PROCEDURE — 94761 N-INVAS EAR/PLS OXIMETRY MLT: CPT

## 2024-06-16 PROCEDURE — 36415 COLL VENOUS BLD VENIPUNCTURE: CPT

## 2024-06-16 PROCEDURE — 80053 COMPREHEN METABOLIC PANEL: CPT

## 2024-06-16 RX ORDER — AZITHROMYCIN 250 MG/1
TABLET, FILM COATED ORAL
Qty: 1 PACKET | Refills: 0 | Status: SHIPPED | OUTPATIENT
Start: 2024-06-16

## 2024-06-16 RX ORDER — GUAIFENESIN 600 MG/1
1200 TABLET, EXTENDED RELEASE ORAL 2 TIMES DAILY
Qty: 30 TABLET | Refills: 0 | Status: SHIPPED | OUTPATIENT
Start: 2024-06-16

## 2024-06-16 RX ORDER — CEFDINIR 300 MG/1
300 CAPSULE ORAL 2 TIMES DAILY
Qty: 14 CAPSULE | Refills: 0 | Status: SHIPPED | OUTPATIENT
Start: 2024-06-16 | End: 2024-06-23

## 2024-06-16 RX ORDER — BUDESONIDE AND FORMOTEROL FUMARATE DIHYDRATE 160; 4.5 UG/1; UG/1
2 AEROSOL RESPIRATORY (INHALATION) 2 TIMES DAILY
Qty: 10.2 G | Refills: 0 | Status: SHIPPED | OUTPATIENT
Start: 2024-06-16

## 2024-06-16 RX ORDER — FERROUS SULFATE 324(65)MG
324 TABLET, DELAYED RELEASE (ENTERIC COATED) ORAL
Qty: 30 TABLET | Refills: 0 | Status: SHIPPED | OUTPATIENT
Start: 2024-06-17

## 2024-06-16 RX ADMIN — MORPHINE SULFATE 4 MG: 4 INJECTION INTRAVENOUS at 08:19

## 2024-06-16 RX ADMIN — AZITHROMYCIN DIHYDRATE 250 MG: 250 TABLET, FILM COATED ORAL at 08:20

## 2024-06-16 RX ADMIN — ENOXAPARIN SODIUM 40 MG: 100 INJECTION SUBCUTANEOUS at 08:21

## 2024-06-16 RX ADMIN — Medication 200 MG: at 08:44

## 2024-06-16 RX ADMIN — CYCLOBENZAPRINE 10 MG: 10 TABLET, FILM COATED ORAL at 10:51

## 2024-06-16 RX ADMIN — BUPROPION HYDROCHLORIDE 75 MG: 75 TABLET, FILM COATED ORAL at 08:20

## 2024-06-16 RX ADMIN — PROPRANOLOL HYDROCHLORIDE 20 MG: 20 TABLET ORAL at 08:20

## 2024-06-16 RX ADMIN — METFORMIN HYDROCHLORIDE 1000 MG: 500 TABLET ORAL at 08:20

## 2024-06-16 RX ADMIN — DULOXETINE HYDROCHLORIDE 60 MG: 30 CAPSULE, DELAYED RELEASE ORAL at 08:19

## 2024-06-16 RX ADMIN — CEFTRIAXONE 1000 MG: 1 INJECTION, POWDER, FOR SOLUTION INTRAMUSCULAR; INTRAVENOUS at 10:32

## 2024-06-16 RX ADMIN — MORPHINE SULFATE 4 MG: 4 INJECTION INTRAVENOUS at 12:34

## 2024-06-16 RX ADMIN — GUAIFENESIN 600 MG: 600 TABLET ORAL at 08:20

## 2024-06-16 RX ADMIN — FERROUS SULFATE TAB EC 324 MG (65 MG FE EQUIVALENT) 324 MG: 324 (65 FE) TABLET DELAYED RESPONSE at 08:20

## 2024-06-16 RX ADMIN — ASPIRIN 81 MG: 81 TABLET, COATED ORAL at 08:20

## 2024-06-16 ASSESSMENT — PAIN SCALES - GENERAL
PAINLEVEL_OUTOF10: 8

## 2024-06-16 ASSESSMENT — PAIN DESCRIPTION - LOCATION: LOCATION: BACK;LEG

## 2024-06-16 NOTE — DISCHARGE SUMMARY
Discharge Summary      Patient ID: Tato Thornton      Patient's PCP: Kimmie Bradley PA-C    Admit Date: 6/14/2024     Discharge Date: 6/16/2024      Admitting Provider: Monica Acosta MD    Discharging Provider: GEOFFREY Way     Reason for this admission:   SOB, AMS, fever     Discharge Diagnoses:     Active Hospital Problems    Diagnosis Date Noted    Anemia [D64.9] 06/15/2024    Acute hypoxic respiratory failure (HCC) [J96.01] 06/14/2024    CAP (community acquired pneumonia) [J18.9] 06/14/2024    Chronic pain syndrome [G89.4] 06/14/2024    Type 2 diabetes mellitus (HCC) [E11.9] 05/20/2016    Essential hypertension [I10] 05/20/2016       Procedures:  XR CHEST PORTABLE   Final Result      Patchy perihilar and basilar airspace disease or accentuated markings, greater in the right lung base which could be asymmetric pulmonary edema or bilateral pneumonia      Electronically signed by Andrei Vogel DO            Consults:   IP CONSULT TO CASE MANAGEMENT  OP follow up with Jyoti for for colonoscopy placed 2/2 anemia     Briefly:   The patient is a 58 y.o. male with a h/o HTN, DM2, HLD who presented with 1 week of cough, stuffy/runny nose, fever, shortness of breath and altered mental status.    Hospital Course:      Patient was admitted on 614 for acute hypoxic respiratory failure secondary to community-acquired pneumonia.  This was complicated by his concurrent asthma.  He did have abnormal ABGs when he got here with an oxygen of 84%.  He was placed on 4 L O2 via nasal cannula with improvement.  During the course of his day he was treated with azithromycin, Rocephin as well as pulmonary toileting.  Patient greatly improved.  Patient had O2 saturation of 98% on room air on day of discharge.  He passed his 6-minute walk test without any concerning desaturations.  He was found to be anemic secondary to iron deficiency while he was here.  He was given iron.  Labs improved.  He did have an EGD this

## 2024-06-16 NOTE — DISCHARGE INSTRUCTIONS
Follow-up with primary care in 1 week.  Sending home on Omnicef and azithromycin.  Continue Symbicort daily and albuterol as needed.  Follow-up with pain management for continued back pain and issues with pain pump.  Will put in referral to Dr. Painter for colonoscopy.  Will provide pulmonology referral.

## 2024-06-16 NOTE — FLOWSHEET NOTE
06/15/24 0802   Vital Signs   Temp 97.8 °F (36.6 °C)   Temp Source Oral   Pulse 89   Heart Rate Source Monitor   Respirations 16   BP 96/66   MAP (Calculated) 76   MAP (mmHg) 75   BP Location Left upper arm   BP Method Automatic   Patient Position High fowlers   Oxygen Therapy   SpO2 99 %   O2 Device Nasal cannula   O2 Flow Rate (L/min) 4 L/min       
   06/15/24 0845   Assessment   Charting Type Shift assessment   Psychosocial   Psychosocial (WDL) WDL   Neurological   Neuro (WDL) WDL   Level of Consciousness 0   Orientation Level Oriented to person;Oriented to place;Oriented to time;Oriented to situation   Cognition Follows commands;Poor judgement;Poor safety awareness   Speech Clear   R Hand  Strong   L Hand  Strong   Gag Present   Swallow Screening   Is the patient unable to remain alert for testing? No   Is the patient on a modified diet (thickened liquids) due to pre-existing dysphagia? No   Is there presence of existing enteral tube feeding via the stomach or nose? No   Is there presence of head-of-bed restrictions (less than 30 degrees)? No   Is there presence of tracheotomy tube? No   Is the patient ordered nothing-by-mouth status? No   3 oz Water Swallow Screen Pass   Carina Coma Scale   Eye Opening 4   Best Verbal Response 5   Best Motor Response 6   Goree Coma Scale Score 15   HEENT (Head, Ears, Eyes, Nose, & Throat)   HEENT (WDL) X   Right Eye Glasses;Impaired vision   Left Eye Glasses;Impaired vision   Teeth Missing teeth   Respiratory   Respiratory (WDL) WDL   Respiratory Pattern Regular   Respiratory Depth Normal   Respiratory Quality/Effort Unlabored   Breath Sounds   Right Upper Lobe Clear   Right Middle Lobe Clear   Right Lower Lobe Crackles   Left Upper Lobe Clear   Left Lower Lobe Clear;Diminished   Cough/Sputum   Cough Strong;Non-productive   Sputum Amount None   Cardiac   Cardiac (WDL) X   Cardiac Regularity Regular   Heart Sounds S1, S2   Cardiac Rhythm Sinus rhythm;Sinus tachy   Cardiac Monitor   Cardiac/Telemetry Monitor On Portable telemetry pack applied   Telemetry Box Number [id number]   Alarm Audible Yes   Telemetry Monitor Alarm Parameters    Gastrointestinal   Abdominal (WDL) WDL   RUQ Bowel Sounds Active   LUQ Bowel Sounds Active   RLQ Bowel Sounds Active   LLQ Bowel Sounds Active   Genitourinary   Genitourinary (WDL) 
   06/16/24 0820   Assessment   Charting Type Shift assessment   Psychosocial   Psychosocial (WDL) WDL   Neurological   Neuro (WDL) WDL   Level of Consciousness 0   Orientation Level Oriented to person;Oriented to place;Oriented to time;Oriented to situation   Cognition Follows commands;Poor judgement;Poor safety awareness   Speech Clear   Carina Coma Scale   Eye Opening 4   Best Verbal Response 5   Best Motor Response 6   Carina Coma Scale Score 15   HEENT (Head, Ears, Eyes, Nose, & Throat)   HEENT (WDL) X   Right Eye Glasses;Impaired vision   Left Eye Glasses;Impaired vision   Teeth Missing teeth   Respiratory   Respiratory (WDL) WDL   Respiratory Interventions Cough & deep breathe;H.O.B. elevated   Respiratory Pattern Regular   Respiratory Depth Normal   Respiratory Quality/Effort Unlabored   Breath Sounds   Right Upper Lobe Clear   Right Middle Lobe Clear   Right Lower Lobe Diminished   Left Upper Lobe Clear   Left Lower Lobe Diminished   Cardiac   Cardiac (WDL) WDL   Cardiac Regularity Regular   Heart Sounds S1, S2   Cardiac Rhythm Sinus rhythm   Cardiac Monitor   Cardiac/Telemetry Monitor On Portable telemetry pack applied   Telemetry Box Number [id number]   Alarm Audible Yes   Alarms Set Yes   Telemetry Monitor Alarm Parameters    Gastrointestinal   Abdominal (WDL) WDL   Last BM (including prior to admit) 06/15/24   RUQ Bowel Sounds Active   LUQ Bowel Sounds Active   RLQ Bowel Sounds Active   LLQ Bowel Sounds Active   Genitourinary   Genitourinary (WDL) WDL   Urine Assessment   Urinary Status Voiding   Peripheral Vascular   Peripheral Vascular (WDL) WDL   Skin Integumentary    Skin Integumentary (WDL) X   Skin Color Pale   Skin Condition/Temp Dry;Warm   Skin Integrity Scars (comment)   Location Upper mid  back   Musculoskeletal   Musculoskeletal (WDL) X   RL Extremity Unsteady   LL Extremity Unsteady   Musculoskeletal Details   L Toes Other (comment)  (amputation partial 2nd  toe left foot from frost 
2nd lt toe)   Care Plan - Discharge Planning Goals   Discharge to home or other facility with appropriate resources Identify barriers to discharge with patient and caregiver

## 2024-06-16 NOTE — CARDIO/PULMONARY
Tato Thornton underwent a 6 min walk test. His initial O2  saturation was  94 % on room air and his baseline heart rate was 90  BPM. He walked a distance of 600ft . His post  O2  saturation was 94  % on room air and his post walk heart rate was 94  BPM.

## 2024-06-16 NOTE — PROGRESS NOTES
Pt is stable and discharged at this time. IV was removed without complication, tip intact.  Pt educated on new medications, next dose time and on pharmacy  or delivery. Pt educated on follow up appointments. Pt verbalized understanding and had no further questions. Pt ambulated to parking lot and left with family.

## 2024-06-16 NOTE — PLAN OF CARE
Problem: Discharge Planning  Goal: Discharge to home or other facility with appropriate resources  Outcome: Progressing  Flowsheets (Taken 6/16/2024 0820)  Discharge to home or other facility with appropriate resources:   Identify barriers to discharge with patient and caregiver   Identify discharge learning needs (meds, wound care, etc)     Problem: Pain  Goal: Verbalizes/displays adequate comfort level or baseline comfort level  Outcome: Progressing     Problem: Safety - Adult  Goal: Free from fall injury  Outcome: Progressing     Problem: Respiratory - Adult  Goal: Achieves optimal ventilation and oxygenation  Outcome: Progressing     Problem: Chronic Conditions and Co-morbidities  Goal: Patient's chronic conditions and co-morbidity symptoms are monitored and maintained or improved  Outcome: Progressing

## 2024-06-16 NOTE — PLAN OF CARE
Problem: Discharge Planning  Goal: Discharge to home or other facility with appropriate resources  6/16/2024 1247 by Lila Antonio RN  Outcome: Adequate for Discharge  6/16/2024 1047 by Lila Antonio RN  Outcome: Progressing  Flowsheets (Taken 6/16/2024 0820)  Discharge to home or other facility with appropriate resources:   Identify barriers to discharge with patient and caregiver   Identify discharge learning needs (meds, wound care, etc)     Problem: Pain  Goal: Verbalizes/displays adequate comfort level or baseline comfort level  6/16/2024 1247 by Lila Antonio RN  Outcome: Adequate for Discharge  6/16/2024 1047 by Lila Antonio RN  Outcome: Progressing     Problem: Safety - Adult  Goal: Free from fall injury  6/16/2024 1247 by Lila Antonio RN  Outcome: Adequate for Discharge  6/16/2024 1047 by Lila Antonio RN  Outcome: Progressing     Problem: Respiratory - Adult  Goal: Achieves optimal ventilation and oxygenation  6/16/2024 1247 by Lila Antonio RN  Outcome: Adequate for Discharge  6/16/2024 1047 by Lila Antonio RN  Outcome: Progressing     Problem: Chronic Conditions and Co-morbidities  Goal: Patient's chronic conditions and co-morbidity symptoms are monitored and maintained or improved  6/16/2024 1247 by Lila Antonio RN  Outcome: Adequate for Discharge  6/16/2024 1047 by Lila Antonio RN  Outcome: Progressing

## 2024-06-17 NOTE — CARE COORDINATION
Patient states he is feeling well. He said his medicines were explained prior to discharge and he has everything he needs to stay at home. He has a follow-up with Kimmie Simon PA-C, on 6/24/2024 at 2 PM. Referrals were sent to [facility] Pulmonology and General Surgery. He had no questions at the time.

## 2024-06-18 LAB
BACTERIA BLD CULT ORG #2: NORMAL
BACTERIA BLD CULT: NORMAL

## 2024-07-06 ENCOUNTER — HOSPITAL ENCOUNTER (EMERGENCY)
Facility: HOSPITAL | Age: 59
Discharge: HOME | End: 2024-07-07
Attending: FAMILY MEDICINE
Payer: MEDICARE

## 2024-07-06 DIAGNOSIS — T40.1X1A ACCIDENTAL OVERDOSE OF HEROIN, INITIAL ENCOUNTER (HCC): Primary | ICD-10-CM

## 2024-07-06 PROCEDURE — 99284 EMERGENCY DEPT VISIT MOD MDM: CPT

## 2024-07-06 ASSESSMENT — PAIN SCALES - GENERAL: PAINLEVEL_OUTOF10: 0

## 2024-07-07 VITALS
DIASTOLIC BLOOD PRESSURE: 85 MMHG | BODY MASS INDEX: 26.52 KG/M2 | TEMPERATURE: 97.9 F | RESPIRATION RATE: 13 BRPM | HEIGHT: 68 IN | SYSTOLIC BLOOD PRESSURE: 129 MMHG | HEART RATE: 113 BPM | WEIGHT: 175 LBS | OXYGEN SATURATION: 93 %

## 2024-07-07 NOTE — ED PROVIDER NOTES
[facility]  EMERGENCY DEPARTMENT ENCOUNTER        Pt Name: Tato Thornton  MRN: [id number]  Birthdate 1965  Date of evaluation: 7/6/2024  Provider: Checo Nair DO  PCP: Kimmie Bradley PA-C  Note Started: 10:55 PM EDT 7/6/24    CHIEF COMPLAINT       Chief Complaint   Patient presents with    Addiction Problem    OTHER       HISTORY OF PRESENT ILLNESS: 1 or more Elements     History from : Patient    Limitations to history : None    Tato Thornton is a 58 y.o. male who presents to the emergency department for accidental overdose of heroin.  Patient states that he has a history of chronic pain issues, he has a morphine pump already established, states that he went over to his friend's girlfriend's house and had done a line of heroin.  He states that he does this not frequently, he snorts it does not inject.  He states that the friend that he usually gets it from was nodding off and hard to wake up, he states that he started to do the same and the girlfriend had given him Narcan.  He started to wake back up and then shortly after decided he was well enough to go home.  Patient ended up driving home to his house and while driving home he states he started feeling \"worse and worse\".  Patient states that he was sitting at the table and mother noted that he would not hardly wake up and he asked her to take him to the hospital patient has not had any Narcan since the initial event    Nursing Notes were all reviewed and agreed with or any disagreements were addressed in the HPI.    REVIEW OF SYSTEMS :      Review of Systems   Psychiatric/Behavioral:          Sleepiness felt like he was going to overdose   All other systems reviewed and are negative.          SURGICAL HISTORY     Past Surgical History:   Procedure Laterality Date    COLONOSCOPY N/A 10/29/2020    COLORECTAL CANCER SCREENING, NOT HIGH RISK performed by Alexis Montes MD at [facility] ENDOSCOPY    INFUSION PUMP

## 2024-07-07 NOTE — ED TRIAGE NOTES
Pt arrives to ED via POV with c/o \"overdosing\".     During triage, Pt states \"I am overdosing on heroin\".     Pt has no other complaints, Pt is in no obvious distress, Pt's respirations are normal and non-labored. Pt's skin WPD. Pt's pupils PERRL, but constricted. Pt states he \"snorted\" heroin about an hour and a half PTA at ED.     Pt says he was administered Narcan today but doesn't know when or how much.     Pt says he has used heroin once in the past.

## 2024-07-18 ENCOUNTER — TELEPHONE (OUTPATIENT)
Dept: SURGERY | Facility: CLINIC | Age: 59
End: 2024-07-18
Payer: MEDICARE

## 2024-07-18 RX ORDER — FERROUS SULFATE 324(65)MG
324 TABLET, DELAYED RELEASE (ENTERIC COATED) ORAL DAILY
Qty: 30 TABLET | Refills: 0 | OUTPATIENT
Start: 2024-07-18

## 2024-07-18 NOTE — TELEPHONE ENCOUNTER
Left a message with the pt's mother regarding a referral for Iron Def. That we received from MANOLO Regalado- will try again to reach the pt to schedule him an office apt. 1st attempt.

## 2024-07-25 ENCOUNTER — HOSPITAL ENCOUNTER (OUTPATIENT)
Dept: GENERAL RADIOLOGY | Facility: HOSPITAL | Age: 59
Discharge: HOME | End: 2024-07-25
Payer: MEDICARE

## 2024-07-25 ENCOUNTER — HOSPITAL ENCOUNTER (OUTPATIENT)
Facility: HOSPITAL | Age: 59
Discharge: HOME | End: 2024-07-25
Payer: MEDICARE

## 2024-07-25 DIAGNOSIS — J18.9 PNEUMONIA DUE TO INFECTIOUS ORGANISM, UNSPECIFIED LATERALITY, UNSPECIFIED PART OF LUNG: ICD-10-CM

## 2024-07-25 PROCEDURE — 71046 X-RAY EXAM CHEST 2 VIEWS: CPT

## 2024-08-15 ENCOUNTER — TELEPHONE (OUTPATIENT)
Dept: SURGERY | Facility: CLINIC | Age: 59
End: 2024-08-15

## 2024-09-03 ENCOUNTER — HOSPITAL ENCOUNTER (OUTPATIENT)
Dept: CT IMAGING | Facility: HOSPITAL | Age: 59
Discharge: HOME | End: 2024-09-03
Payer: MEDICARE

## 2024-09-03 ENCOUNTER — HOSPITAL ENCOUNTER (OUTPATIENT)
Dept: RESPIRATORY THERAPY | Facility: HOSPITAL | Age: 59
Discharge: HOME | End: 2024-09-03
Payer: MEDICARE

## 2024-09-03 DIAGNOSIS — R93.89 ABNORMAL CHEST CT: ICD-10-CM

## 2024-09-03 DIAGNOSIS — R05.3 CHRONIC COUGH: ICD-10-CM

## 2024-09-03 PROCEDURE — 94729 DIFFUSING CAPACITY: CPT | Performed by: INTERNAL MEDICINE

## 2024-09-03 PROCEDURE — 94726 PLETHYSMOGRAPHY LUNG VOLUMES: CPT | Performed by: INTERNAL MEDICINE

## 2024-09-03 PROCEDURE — 94729 DIFFUSING CAPACITY: CPT

## 2024-09-03 PROCEDURE — 71250 CT THORAX DX C-: CPT

## 2024-09-03 PROCEDURE — 94010 BREATHING CAPACITY TEST: CPT

## 2024-09-03 PROCEDURE — 94010 BREATHING CAPACITY TEST: CPT | Performed by: INTERNAL MEDICINE

## 2024-09-03 PROCEDURE — 94726 PLETHYSMOGRAPHY LUNG VOLUMES: CPT

## 2024-09-03 NOTE — PROCEDURES
Media Information        Pulmonary Function Study    Interpretation:    The FVC is mildly reduced. FEV1 is Normal. FEV1/FVC ratio is Normal. After bronchodilator therapy there was no significant change in FEV1. Total lung capacity is Normal. Residual volume is Normal.      Diffusing lung capacity when corrected for alveolar volume is Normal.         Impression:    Normal pulmonary function.         Megan Carrera MD, FCCP, [facility]

## 2024-09-04 DIAGNOSIS — R93.89 ABNORMAL CHEST CT: ICD-10-CM

## 2024-09-23 ENCOUNTER — APPOINTMENT (OUTPATIENT)
Dept: GENERAL RADIOLOGY | Facility: HOSPITAL | Age: 59
End: 2024-09-23
Payer: MEDICARE

## 2024-09-23 ENCOUNTER — HOSPITAL ENCOUNTER (EMERGENCY)
Facility: HOSPITAL | Age: 59
Discharge: HOME | End: 2024-09-23
Attending: EMERGENCY MEDICINE
Payer: MEDICARE

## 2024-09-23 VITALS
BODY MASS INDEX: 27.28 KG/M2 | HEART RATE: 89 BPM | TEMPERATURE: 98.4 F | RESPIRATION RATE: 16 BRPM | WEIGHT: 180 LBS | OXYGEN SATURATION: 98 % | HEIGHT: 68 IN | DIASTOLIC BLOOD PRESSURE: 90 MMHG | SYSTOLIC BLOOD PRESSURE: 139 MMHG

## 2024-09-23 DIAGNOSIS — S52.124A CLOSED NONDISPLACED FRACTURE OF HEAD OF RIGHT RADIUS, INITIAL ENCOUNTER: Primary | ICD-10-CM

## 2024-09-23 PROCEDURE — 73070 X-RAY EXAM OF ELBOW: CPT

## 2024-09-23 PROCEDURE — 73090 X-RAY EXAM OF FOREARM: CPT

## 2024-09-23 PROCEDURE — 6370000000 HC RX 637 (ALT 250 FOR IP): Performed by: EMERGENCY MEDICINE

## 2024-09-23 PROCEDURE — 29105 APPLICATION LONG ARM SPLINT: CPT

## 2024-09-23 PROCEDURE — 99283 EMERGENCY DEPT VISIT LOW MDM: CPT

## 2024-09-23 RX ORDER — HYDROCODONE BITARTRATE AND ACETAMINOPHEN 10; 325 MG/1; MG/1
1 TABLET ORAL EVERY 6 HOURS PRN
Qty: 12 TABLET | Refills: 0 | Status: SHIPPED | OUTPATIENT
Start: 2024-09-23 | End: 2024-09-26

## 2024-09-23 RX ORDER — HYDROCODONE BITARTRATE AND ACETAMINOPHEN 10; 325 MG/1; MG/1
1 TABLET ORAL ONCE
Status: COMPLETED | OUTPATIENT
Start: 2024-09-23 | End: 2024-09-23

## 2024-09-23 RX ADMIN — HYDROCODONE BITARTRATE AND ACETAMINOPHEN 1 TABLET: 10; 325 TABLET ORAL at 19:53

## 2024-09-23 ASSESSMENT — PAIN DESCRIPTION - LOCATION: LOCATION: ARM

## 2024-09-23 ASSESSMENT — ENCOUNTER SYMPTOMS
SORE THROAT: 0
SINUS PRESSURE: 0
EYE PAIN: 0
VOMITING: 0
RHINORRHEA: 0
SHORTNESS OF BREATH: 0
EYE ITCHING: 0
WHEEZING: 0
CHOKING: 0
CHEST TIGHTNESS: 0
DIARRHEA: 0
COUGH: 0
TROUBLE SWALLOWING: 0
EYE REDNESS: 0
NAUSEA: 0

## 2024-09-23 ASSESSMENT — PAIN SCALES - GENERAL: PAINLEVEL_OUTOF10: 9

## 2024-09-23 ASSESSMENT — PAIN - FUNCTIONAL ASSESSMENT: PAIN_FUNCTIONAL_ASSESSMENT: 0-10

## 2024-09-23 ASSESSMENT — PAIN DESCRIPTION - ORIENTATION: ORIENTATION: RIGHT

## 2024-09-23 ASSESSMENT — PAIN DESCRIPTION - FREQUENCY: FREQUENCY: CONTINUOUS

## 2024-09-23 ASSESSMENT — PAIN DESCRIPTION - PAIN TYPE: TYPE: ACUTE PAIN

## 2024-09-24 ENCOUNTER — OFFICE VISIT (OUTPATIENT)
Dept: ORTHOPEDIC SURGERY | Facility: CLINIC | Age: 59
End: 2024-09-24
Payer: MEDICARE

## 2024-09-24 VITALS
DIASTOLIC BLOOD PRESSURE: 80 MMHG | BODY MASS INDEX: 26.7 KG/M2 | WEIGHT: 176.2 LBS | HEIGHT: 68 IN | SYSTOLIC BLOOD PRESSURE: 130 MMHG | TEMPERATURE: 97.5 F

## 2024-09-24 DIAGNOSIS — M25.521 ARTHRALGIA OF ELBOW, RIGHT: ICD-10-CM

## 2024-09-24 DIAGNOSIS — S52.134A CLOSED NONDISPLACED FRACTURE OF NECK OF RIGHT RADIUS, INITIAL ENCOUNTER: Primary | ICD-10-CM

## 2024-09-24 PROCEDURE — 99203 OFFICE O/P NEW LOW 30 MIN: CPT | Performed by: STUDENT IN AN ORGANIZED HEALTH CARE EDUCATION/TRAINING PROGRAM

## 2024-09-24 RX ORDER — TRAMADOL HYDROCHLORIDE 50 MG/1
50 TABLET ORAL EVERY 8 HOURS PRN
Qty: 15 TABLET | Refills: 0 | Status: SHIPPED | OUTPATIENT
Start: 2024-09-24

## 2024-09-27 ENCOUNTER — HOSPITAL ENCOUNTER (EMERGENCY)
Facility: HOSPITAL | Age: 59
Discharge: HOME | End: 2024-09-27
Attending: HOSPITALIST
Payer: MEDICARE

## 2024-09-27 VITALS
TEMPERATURE: 98 F | RESPIRATION RATE: 16 BRPM | SYSTOLIC BLOOD PRESSURE: 117 MMHG | DIASTOLIC BLOOD PRESSURE: 96 MMHG | HEART RATE: 120 BPM | OXYGEN SATURATION: 98 %

## 2024-09-27 DIAGNOSIS — S52.134A CLOSED NONDISPLACED FRACTURE OF NECK OF RIGHT RADIUS, INITIAL ENCOUNTER: Primary | ICD-10-CM

## 2024-09-27 PROCEDURE — 99283 EMERGENCY DEPT VISIT LOW MDM: CPT

## 2024-09-27 RX ORDER — HYDROCODONE BITARTRATE AND ACETAMINOPHEN 10; 325 MG/1; MG/1
1 TABLET ORAL EVERY 6 HOURS PRN
Qty: 12 TABLET | Refills: 0 | Status: SHIPPED | OUTPATIENT
Start: 2024-09-27 | End: 2024-09-30

## 2024-09-27 ASSESSMENT — PAIN - FUNCTIONAL ASSESSMENT
PAIN_FUNCTIONAL_ASSESSMENT: 0-10

## 2024-09-27 ASSESSMENT — PAIN SCALES - GENERAL
PAINLEVEL_OUTOF10: 8
PAINLEVEL_OUTOF10: 8

## 2024-09-27 ASSESSMENT — PAIN DESCRIPTION - PAIN TYPE: TYPE: ACUTE PAIN

## 2024-09-27 ASSESSMENT — LIFESTYLE VARIABLES: HOW OFTEN DO YOU HAVE A DRINK CONTAINING ALCOHOL: NEVER

## 2024-09-27 NOTE — ED PROVIDER NOTES
Cancer (HCC)     melanoma on back    Chronic back pain     Depression     Elevated liver enzymes     Erectile dysfunction     Headache     Hyperlipidemia     Hypertension     Hypogonadism     Melanoma (HCC)     Memory loss     S/P Botox injection     neck x12, face x6    Type II or unspecified type diabetes mellitus without mention of complication, not stated as uncontrolled        SURGICAL HISTORY     Past Surgical History:   Procedure Laterality Date    COLONOSCOPY N/A 10/29/2020    COLORECTAL CANCER SCREENING, NOT HIGH RISK performed by Alexis Montes MD at [facility] ENDOSCOPY    INFUSION PUMP IMPLANTATION  03/2023    morphine pain pump    SKIN CANCER EXCISION      back    TONSILLECTOMY      UPPER GASTROINTESTINAL ENDOSCOPY N/A 10/01/2020    EGD BIOPSY performed by Alexis Montes MD at [facility] ENDOSCOPY       CURRENTMEDICATIONS       Previous Medications    ALBUTEROL SULFATE HFA (VENTOLIN HFA) 108 (90 BASE) MCG/ACT INHALER    Inhale 2 puffs into the lungs every 4 hours as needed for Wheezing    ALPRAZOLAM (XANAX) 0.25 MG TABLET    Take 1 tablet by mouth 2 times daily as needed for Sleep.    ASPIRIN 81 MG EC TABLET    Take 1 tablet by mouth daily    AZITHROMYCIN (ZITHROMAX) 250 MG TABLET    2 on day one, 1 on day two through five    BUDESONIDE-FORMOTEROL (SYMBICORT) 160-4.5 MCG/ACT AERO    Inhale 2 puffs into the lungs 2 times daily    BUPROPION (WELLBUTRIN) 75 MG TABLET    Take 1 tablet by mouth 2 times daily.    CYCLOBENZAPRINE (FLEXERIL) 10 MG TABLET    Take 1 tablet by mouth 3 times daily as needed for Muscle spasms    DULOXETINE (CYMBALTA) 60 MG CAPSULE    Take 1 capsule by mouth daily.    FERROUS SULFATE 324 (65 FE) MG EC TABLET    Take 1 tablet by mouth daily (with breakfast)    GALCANEZUMAB-GNLM (EMGALITY) 120 MG/ML SOSY INJECTION    Inject 1 mL into the skin every 30 days    GLUCOSE BLOOD VI TEST STRIPS (JALIL CONTOUR NEXT TEST) STRIP    1 each by Does not apply route daily. As needed.    GUAIFENESIN (MUCINEX)  pain to the right elbow.  Past medical history is pertinent for asthma, cancer/melanoma, chronic back pain, depression, elevated liver enzymes, erectile dysfunction, headache, hyperlipidemia, hypertension, hypogonadism, memory loss, status Botox injection in the neck, type 2 diabetes    After initial evaluation examination I did have conversation with the patient about the upcoming plan, treatment possible disposition which they are agreeable to the times dictation.  Advised we reviewed the orthopedic note and epic and that they had removed the splint that he was placed in here and left him without any hard immobilization besides the sling and want him to follow-up in 2 weeks for repeat radiograph there.  They had written for tramadol but he states the tramadol is not helping with his pain at all.  Patient advised that technically he is already been seen by orthopedics so that so he needs to contact for his pain control not the emergency department.  Advised we write him 1 more prescription since this now after 6 PM on a Friday and there is no way he is going to get up with anyone with orthopedic clinic until Monday that we would write him another prescription for the Norco 10 but he would need to contact them Monday for continuation or ongoing pain control and let them know the pain medication they prescribed was not working and he had to come here to the emergency department.  We cannot continually do this especially since he is already being managed by orthopedics.  Advised would be a different story if he had never followed up with them yet.  He does state his understanding this otherwise patient is requesting at least an Ace wrap to be placed on his elbow which we did comply placed him back in his sling/shoulder immobilizer for comfort reviewed the orthopedic note with the patient again.  He does state his understanding discharged home in stable condition.  Will write for 3 more days worth of Norco 10/325.

## 2024-09-27 NOTE — ED NOTES
Reviewed discharge plan with Tato Thornton.  Encouraged him to f/u with Kimmie Bradley PA-C and Dr. Jacobson and he understood.  NAD noted on discharge, gait steady.  Reviewed discharge prescription for:     Current Discharge Medication List        START taking these medications    Details   HYDROcodone-acetaminophen (NORCO)  MG per tablet Take 1 tablet by mouth every 6 hours as needed for Pain for up to 3 days. Max Daily Amount: 4 tablets  Qty: 12 tablet, Refills: 0    Associated Diagnoses: Closed nondisplaced fracture of neck of right radius, initial encounter             Tato states understanding of how and when to take medications.      Electronically signed by Jackie Faye RN on 9/27/2024 at 6:37 PM

## 2024-09-27 NOTE — ED TRIAGE NOTES
Pt states that he broke his elbow, saw dr gregg's office.  He is here today for pain control.  He does have his arm in a sling.

## 2024-09-30 ENCOUNTER — TELEPHONE (OUTPATIENT)
Dept: ORTHOPEDIC SURGERY | Facility: CLINIC | Age: 59
End: 2024-09-30
Payer: MEDICARE

## 2024-09-30 NOTE — TELEPHONE ENCOUNTER
Provider: PETRONA DEATON    Caller: NATALIE REIS    Relationship to Patient: SELF    [facility]: [id number] - [place], [place] - [address] AT [facility] [address]  - [phone]  - [phone] FX     Phone Number: [phone]    Reason for Call: PT CALLING TO REQUEST PAIN MEDICATION, STATING HE WAS IN LAST WEEK ON 9/24/24 AND WAS TOLD TO CALL BACK THIS WEEK. PT STATES HE IS STILL HAVING A LOT OF PAIN.

## 2024-10-01 NOTE — TELEPHONE ENCOUNTER
"Hub staff attempted to follow warm transfer process and was unsuccessful     Caller: Sumit Echeverria \"Martínez\"    Relationship to patient: Self    Best call back number: [phone]*    Patient is needing: PT IS CALLING TO REQUEST MEDICATION.. I CANNOT RELAY PETRONA ORTIZ'S MESSAGE TO THE PT.. PLEASE ADVISE..          "

## 2024-10-01 NOTE — TELEPHONE ENCOUNTER
Spoke to patient and informed him that we cannot send in anymore narcotics. Patient declined anti-inflammatories due to GI upset.

## 2024-10-03 ENCOUNTER — HOSPITAL ENCOUNTER (EMERGENCY)
Facility: HOSPITAL | Age: 59
Discharge: HOME | End: 2024-10-03
Attending: HOSPITALIST
Payer: MEDICARE

## 2024-10-03 VITALS
BODY MASS INDEX: 26.67 KG/M2 | HEIGHT: 68 IN | WEIGHT: 176 LBS | HEART RATE: 88 BPM | SYSTOLIC BLOOD PRESSURE: 111 MMHG | OXYGEN SATURATION: 97 % | DIASTOLIC BLOOD PRESSURE: 84 MMHG | TEMPERATURE: 98.1 F | RESPIRATION RATE: 16 BRPM

## 2024-10-03 DIAGNOSIS — S52.134A CLOSED NONDISPLACED FRACTURE OF NECK OF RIGHT RADIUS, INITIAL ENCOUNTER: Primary | ICD-10-CM

## 2024-10-03 PROCEDURE — 6370000000 HC RX 637 (ALT 250 FOR IP): Performed by: HOSPITALIST

## 2024-10-03 PROCEDURE — 99283 EMERGENCY DEPT VISIT LOW MDM: CPT

## 2024-10-03 RX ORDER — OXYCODONE AND ACETAMINOPHEN 10; 325 MG/1; MG/1
1 TABLET ORAL ONCE
Status: COMPLETED | OUTPATIENT
Start: 2024-10-03 | End: 2024-10-03

## 2024-10-03 RX ADMIN — OXYCODONE AND ACETAMINOPHEN 1 TABLET: 325; 10 TABLET ORAL at 12:43

## 2024-10-03 ASSESSMENT — PAIN DESCRIPTION - ORIENTATION: ORIENTATION: RIGHT

## 2024-10-03 ASSESSMENT — LIFESTYLE VARIABLES: HOW OFTEN DO YOU HAVE A DRINK CONTAINING ALCOHOL: NEVER

## 2024-10-03 ASSESSMENT — PAIN DESCRIPTION - DESCRIPTORS: DESCRIPTORS: ACHING

## 2024-10-03 ASSESSMENT — PAIN DESCRIPTION - PAIN TYPE: TYPE: ACUTE PAIN

## 2024-10-03 ASSESSMENT — PAIN - FUNCTIONAL ASSESSMENT: PAIN_FUNCTIONAL_ASSESSMENT: 0-10

## 2024-10-03 ASSESSMENT — PAIN SCALES - GENERAL
PAINLEVEL_OUTOF10: 10
PAINLEVEL_OUTOF10: 9

## 2024-10-03 ASSESSMENT — PAIN DESCRIPTION - LOCATION: LOCATION: ARM

## 2024-10-03 NOTE — ED NOTES
Reviewed discharge plan with Tato Thornton.  Encouraged him to f/u with Kimmie Bradley PA-C and with Dr Jacobson and he understood.  NAD noted on discharge, gait steady.      Electronically signed by Jackie Faye RN on 10/3/2024 at 1:04 PM

## 2024-10-03 NOTE — ED TRIAGE NOTES
Pt here with arm pain, requesting medication for pain control.  He is also requesting that his arm be placed in a cast.  He states he did call ortho but they would not write for more pain meds.  He is following with kim

## 2024-10-03 NOTE — ED PROVIDER NOTES
[facility]  EMERGENCY DEPARTMENT ENCOUNTER        Pt Name: Tato Thornton  MRN: [id number]  Birthdate 1965  Date of evaluation: 10/3/2024  Provider: Jovon Dunlap DO  PCP: Kimmie Bradley PA-C  Note Started: 12:03 PM EDT 10/3/24    CHIEF COMPLAINT       Chief Complaint   Patient presents with    Arm Pain       HISTORY OF PRESENT ILLNESS: 1 or more Elements     History from : Patient    Limitations to history : None    Tato Thornton is a 59 y.o. male who presents to the emergency department for right elbow pain.  Patient was actually seen here on 9/23/2024 and diagnosed with closed nondisplaced fracture neck of the right radius.  Patient has already follow-up with orthopedics he saw him the following day on 9/24/2024.  They removed the splint and placed him in a sling.  They wrote tramadol for pain medication and advised him to follow-up around October 8.  Patient has repeat or follow-up appointment on 10/10/2024 which is 1 week from today.  Patient had came in for reevaluation on 9/27/2024 because of increased pain.  Advised that we would write him a prescription for pain medication but now that he is under the care of orthopedics he needs to contact them to let them know what is going on if he needs better pain management.  He contacted them today but they advised him that they could not write him for any more narcotic pain medication.  Patient presented here to the emergency department because of his discomfort.  He is also wanting a new Ace wrap for his elbow.  Patient's witnessed still using his right arm even though it is in the sling.  Patient advised that he should not be trying to use that arm that reason why he is having discomfort but advised that he is already under the care of orthopedics that I cannot write him a prescription for pain medication if they do not feel the need for pain medication especially opioids.  Advised we can give him a dose here  liver enzymes, Erectile dysfunction, Headache, Hyperlipidemia, Hypertension, Hypogonadism, Melanoma (HCC), Memory loss, S/P Botox injection, and Type II or unspecified type diabetes mellitus without mention of complication, not stated as uncontrolled.    Records Reviewed : None    Disposition Considerations (include 1 Tests not done, Shared Decision Making, Pt Expectation of Test or Tx.): Patient agreeable to new Ace wrap and pain medication here.  Appropriate for outpatient management with Ortho follow-up.      I am the Primary Clinician of Record.    FINAL IMPRESSION      1. Closed nondisplaced fracture of neck of right radius, initial encounter          DISPOSITION/PLAN     DISPOSITION Discharge - Pending Orders Complete 10/03/2024 12:19:52 PM  Condition at Disposition: Data Unavailable      PATIENT REFERRED TO:  Kimmie Bradley, PAPreetiC  [address] [facility] [address] [phone]    In 2 days      [facility]  [address] [place] [id number] [phone]    As needed, If symptoms worsen      DISCHARGE MEDICATIONS:  New Prescriptions    No medications on file       DISCONTINUED MEDICATIONS:  Discontinued Medications    No medications on file              (Please note that portions of this note were completed with a voice recognition program.  Efforts were made to edit the dictations but occasionally words are mis-transcribed.)    Jovon Dunlap DO (electronically signed)           Jovon Dunlap DO  10/03/24 9064

## 2024-10-05 ENCOUNTER — OFFICE VISIT (OUTPATIENT)
Dept: PRIMARY CARE CLINIC | Age: 59
End: 2024-10-05

## 2024-10-05 VITALS
HEART RATE: 100 BPM | TEMPERATURE: 101 F | SYSTOLIC BLOOD PRESSURE: 106 MMHG | DIASTOLIC BLOOD PRESSURE: 64 MMHG | OXYGEN SATURATION: 93 %

## 2024-10-05 DIAGNOSIS — Z76.5 DRUG-SEEKING BEHAVIOR: ICD-10-CM

## 2024-10-05 DIAGNOSIS — S52.134S CLOSED NONDISPLACED FRACTURE OF NECK OF RIGHT RADIUS, SEQUELA: Primary | ICD-10-CM

## 2024-10-05 RX ORDER — IBUPROFEN 600 MG/1
600 TABLET, FILM COATED ORAL 2 TIMES DAILY
Qty: 20 TABLET | Refills: 0 | Status: SHIPPED | OUTPATIENT
Start: 2024-10-05 | End: 2024-10-15

## 2024-10-05 ASSESSMENT — ENCOUNTER SYMPTOMS
COUGH: 0
SORE THROAT: 0
SINUS PAIN: 0
SINUS PRESSURE: 0

## 2024-10-05 NOTE — PROGRESS NOTES
Chief Complaint   Patient presents with    Fever     Pt says he has had a fever on and off after he broke his right elbow around a week ago.        Have you seen any other physician or provider since your last visit no    Have you had any other diagnostic tests since your last visit? no    Have you changed or stopped any medications since your last visit? no       
gone to the ED x3 for this injury and/or pain control. Patient has a temperature of 101 degrees in the clinic. On exam there is no erythema, edema or warmth over his injury.  He does have a few small abrasions that are unremarkable.  I am not concerned for any infection due to his fractures.  -Advised patient the intermittent fevers are likely due to the bodies natural inflammatory process has his fractures are healing.  Advised patient to take ibuprofen or Tylenol to help with the fevers.  Advised patient if he develops any redness, warmth or swelling over his injury to return to clinic or contact orthopedics  - ibuprofen (ADVIL;MOTRIN) 600 MG tablet; Take 1 tablet by mouth in the morning and at bedtime for 10 days  Dispense: 20 tablet; Refill: 0    2. Drug seeking behavior     - Patient asks if I can prescribe him Lortab or oxycodone today, specifically asks for 5 or 6 pills.  Advised patient that I cannot prescribe him any pain medications and per orthopedics note they do not feel his injury requires opiate medications at this time.  Advised patient to utilize NSAIDs or Tylenol for pain control.      An electronic signature was used to authenticate this note.   Alexis Budine, PA-C

## 2024-10-11 ENCOUNTER — TELEPHONE (OUTPATIENT)
Dept: ORTHOPEDIC SURGERY | Facility: CLINIC | Age: 59
End: 2024-10-11
Payer: MEDICARE

## 2024-10-11 DIAGNOSIS — S52.134A CLOSED NONDISPLACED FRACTURE OF NECK OF RIGHT RADIUS, INITIAL ENCOUNTER: Primary | ICD-10-CM

## 2024-10-11 NOTE — TELEPHONE ENCOUNTER
"Patient called requesting something for pain, he stated the Tramadol did not help and the Hydrocodone he got from the ER did not help, he specifically requested \"a few Percocet\" until his follow up on Monday.  "

## 2024-10-11 NOTE — TELEPHONE ENCOUNTER
Spoke with patient to inform we cannot prescribe any opioids due to him being in pain management and receiving Morphine, he is already taking Flexeril and stated he can't tolerate IBU or NSAIDS, he stated he understood

## 2024-10-14 ENCOUNTER — OFFICE VISIT (OUTPATIENT)
Dept: ORTHOPEDIC SURGERY | Facility: CLINIC | Age: 59
End: 2024-10-14
Payer: MEDICARE

## 2024-10-14 VITALS
SYSTOLIC BLOOD PRESSURE: 142 MMHG | WEIGHT: 184.2 LBS | BODY MASS INDEX: 27.92 KG/M2 | HEIGHT: 68 IN | DIASTOLIC BLOOD PRESSURE: 92 MMHG

## 2024-10-14 DIAGNOSIS — S52.121D CLOSED DISPLACED FRACTURE OF HEAD OF RIGHT RADIUS WITH ROUTINE HEALING, SUBSEQUENT ENCOUNTER: Primary | ICD-10-CM

## 2024-10-14 DIAGNOSIS — S52.134D CLOSED NONDISPLACED FRACTURE OF NECK OF RIGHT RADIUS WITH ROUTINE HEALING, SUBSEQUENT ENCOUNTER: ICD-10-CM

## 2024-10-14 PROCEDURE — 99213 OFFICE O/P EST LOW 20 MIN: CPT | Performed by: STUDENT IN AN ORGANIZED HEALTH CARE EDUCATION/TRAINING PROGRAM

## 2024-10-14 RX ORDER — TRAMADOL HYDROCHLORIDE 50 MG/1
50 TABLET ORAL EVERY 12 HOURS PRN
Qty: 7 TABLET | Refills: 0 | Status: SHIPPED | OUTPATIENT
Start: 2024-10-14

## 2024-10-14 RX ORDER — FERROUS SULFATE 324(65)MG
324 TABLET, DELAYED RELEASE (ENTERIC COATED) ORAL
COMMUNITY
Start: 2024-06-17

## 2024-10-14 NOTE — PROGRESS NOTES
Office Note     Name: Sumit Echeverria    : 1965     MRN: [id number]     Chief Complaint  Follow-up of the Right Elbow (Closed nondisplaced fracture of neck of right radius DOI 24)    Subjective     History of Present Illness:  Sumit Echeverrai is a 59 y.o. male presenting today for right elbow follow-up for right radial head/neck fracture.  Patient denies any new or worsening symptoms.  Continues to be in significant pain.  Patient states the pain tends to be worse upon waking and also when trying to go to sleep and also tends to be worse at the end of the day.  States the pain radiates from his elbow and the radial head area to approximately mid forearm.  Has been wearing his sling since his last visit.  He does request pain medicine refill, specifically Percocet.  Patient is currently in pain management and has an implanted morphine pump.    Review of Systems   Musculoskeletal:  Positive for arthralgias (right elbow) and joint swelling (right elbow).        Past Medical History:   Diagnosis Date    Anxiety     Cancer     Depression     Diabetes mellitus     Hyperlipidemia     Hypertension     Migraine         Past Surgical History:   Procedure Laterality Date    AXILLARY LYMPH NODE BIOPSY/EXCISION      BRAIN SURGERY      SKIN CANCER EXCISION      SKIN CANCER EXCISION      TOE AMPUTATION Left        Family History   Problem Relation Age of Onset    Cancer Father     Hypertension Father     High cholesterol Father     Hypertension Brother        Social History     Socioeconomic History    Marital status: Single   Tobacco Use    Smoking status: Never     Passive exposure: Past    Smokeless tobacco: Never   Vaping Use    Vaping status: Never Used   Substance and Sexual Activity    Alcohol use: Not Currently    Drug use: No    Sexual activity: Defer         Current Outpatient Medications:     ferrous sulfate 324 (65 Fe) MG tablet delayed-release EC tablet, Take 1 tablet by mouth., Disp: ,  Rfl:     traMADol (ULTRAM) 50 MG tablet, Take 1 tablet by mouth Every 12 (Twelve) Hours As Needed (PRN PAIN)., Disp: 7 tablet, Rfl: 0    albuterol sulfate  (90 Base) MCG/ACT inhaler, Inhale 2 puffs Every 4 (Four) Hours As Needed for Wheezing or Shortness of Air., Disp: 8 g, Rfl: 5    ALPRAZolam (XANAX) 0.25 MG tablet, Take 1 tablet by mouth 2 (Two) Times a Day As Needed., Disp: , Rfl:     aspirin 81 MG EC tablet, Take  by mouth., Disp: , Rfl:     buPROPion SR (WELLBUTRIN SR) 100 MG 12 hr tablet, Wellbutrin SR, Disp: , Rfl:     butalbital-acetaminophen-caffeine (FIORICET, ESGIC) -40 MG per tablet, butalbital-acetaminophen-caffeine 50 mg-325 mg-40 mg tablet  TK 1 T PO Q 4 H PRF HEADACHES, Disp: , Rfl:     cyclobenzaprine (FLEXERIL) 10 MG tablet, , Disp: , Rfl: 3    diazePAM (VALIUM) 5 MG tablet, TAKE 1 TABLET BY MOUTH TWICE DAILY AS NEEDED FOR SEVERE ANXIETY ONLY, Disp: , Rfl:     DULoxetine (CYMBALTA) 60 MG capsule, Take  by mouth. Take one capsule by mouth daily, Disp: , Rfl:     Emgality 120 MG/ML auto-injector pen, , Disp: , Rfl:     Evolocumab (Repatha SureClick) solution auto-injector SureClick injection, Repatha SureClick 140 mg/mL subcutaneous pen injector, Disp: , Rfl:     folic acid (FOLVITE) 1 MG tablet, Take 1 tablet by mouth Daily., Disp: , Rfl:     glucose blood test strip, , Disp: , Rfl:     glucose blood test strip, OneTouch Ultra Blue Test Strip, Disp: , Rfl:     HYDROcodone-acetaminophen (NORCO)  MG per tablet, Take  by mouth., Disp: , Rfl:     meclizine (ANTIVERT) 25 MG tablet, TAKE 1 TABLET BY MOUTH TWICE DAILY AS NEEDED FOR VERTIGO, Disp: , Rfl:     metFORMIN (GLUCOPHAGE) 1000 MG tablet, Take  by mouth. Take one tablet by mouth twice daily, Disp: , Rfl:     promethazine (PHENERGAN) 25 MG tablet, , Disp: , Rfl:     propranolol (INDERAL) 20 MG tablet, take 1 tablet by mouth twice a day, Disp: , Rfl:     SUMAtriptan (IMITREX) 100 MG tablet, , Disp: , Rfl:     Current  "Facility-Administered Medications:     bupivacaine (MARCAINE) injection 5 mL, 5 mL, Injection, Once, Yael Pires APRN    OnabotulinumtoxinA 155 Units, 155 Units, Intramuscular, Once, Yael Pires APRN    No Known Allergies        Objective   /92   Ht 172.7 cm (68\")   Wt 83.6 kg (184 lb 3.2 oz)   BMI 28.01 kg/m²            Physical Exam  Right Elbow Exam     Tenderness   The patient is experiencing tenderness in the radial head.     Range of Motion   Extension:  -20   Flexion:  120   Pronation:  normal   Supination:  normal     Other   Erythema: absent  Sensation: normal  Pulse: present           Extremity DVT signs are negative by clinical screen.     Independent Review of Radiographic Studies:    Three-view plain films of the right elbow were done in office today for the evaluation of fracture healing, comparison views not available.  There is an impacted nondisplaced fracture of the right radial neck.  Oblique and lateral view also reveals cortical disruption of the lateral aspect of the radial head suspicious for minimally displaced radial head fracture.  There is what appears to be osteophyte formation on the lateral humeral condyle.    Procedures    Assessment and Plan   Diagnoses and all orders for this visit:    1. Closed displaced fracture of head of right radius with routine healing, subsequent encounter (Primary)    2. Closed nondisplaced fracture of neck of right radius with routine healing, subsequent encounter  -     traMADol (ULTRAM) 50 MG tablet; Take 1 tablet by mouth Every 12 (Twelve) Hours As Needed (PRN PAIN).  Dispense: 7 tablet; Refill: 0       Discussion of orthopedic goals  Orthopedic activities reviewed and patient expressed appreciation  Regular exercise as tolerated  Risk, benefits, and merits of treatment alternatives reviewed with the patient and questions answered  Patient guided on mobility and conditioning exercises  Ice, heat, and/or modalities as beneficial  Elevate " hand for residual swelling  Reduced physical activity as appropriate and avoid offending activity  Weight bearing parameters reviewed  Use brace as instructed  Counseling on diet, nutrition, fitness exercise, and weight reduction goals    Recommendations/Plan:  Exercise, medications, injections, other patient advice, and return appointment as noted.  Patient is encouraged to call or return for any issues or concerns.    Continue current treatment plan and follow-up in 4 weeks for repeat x-rays and exam.  Patient is sent in taper dose of tramadol.  Justin today shows he has received hydrocodone from 2 different physicians at the end of September 2024.  Advised that I will do a short course of tramadol today but will most likely not refill any tramadol.  I did look into other options such as anti-inflammatories though patient states that he has 2 active stomach ulcers.  He states he can take Tylenol though it never helps him for his pain.  He takes Flexeril daily for chronic back pain.  If he requires additional pain medicine he will need to follow-up with his pain specialist.    Return in about 4 weeks (around 11/11/2024) for XOA.  Patient agreeable to call or return sooner for any concerns.

## 2024-10-22 ENCOUNTER — TELEPHONE (OUTPATIENT)
Dept: PULMONOLOGY | Facility: CLINIC | Age: 59
End: 2024-10-22
Payer: MEDICARE

## 2024-10-22 NOTE — TELEPHONE ENCOUNTER
Called patient, no answer, left  for call back.    Calling patient to see if he has had his PFT or lab tests done before tomorrows appointment.

## 2024-10-24 ENCOUNTER — TELEPHONE (OUTPATIENT)
Dept: ORTHOPEDIC SURGERY | Facility: CLINIC | Age: 59
End: 2024-10-24
Payer: MEDICARE

## 2024-10-24 NOTE — TELEPHONE ENCOUNTER
Left message for patient to call back to see why appointment was moved to tomorrow from 11/12/24.

## 2024-10-25 ENCOUNTER — OFFICE VISIT (OUTPATIENT)
Dept: ORTHOPEDIC SURGERY | Facility: CLINIC | Age: 59
End: 2024-10-25
Payer: MEDICARE

## 2024-10-25 VITALS
BODY MASS INDEX: 28.49 KG/M2 | HEIGHT: 68 IN | DIASTOLIC BLOOD PRESSURE: 86 MMHG | WEIGHT: 188 LBS | SYSTOLIC BLOOD PRESSURE: 140 MMHG

## 2024-10-25 DIAGNOSIS — S52.134D CLOSED NONDISPLACED FRACTURE OF NECK OF RIGHT RADIUS WITH ROUTINE HEALING, SUBSEQUENT ENCOUNTER: Primary | ICD-10-CM

## 2024-10-25 RX ORDER — ORAL SEMAGLUTIDE 7 MG/1
1 TABLET ORAL DAILY
COMMUNITY
Start: 2024-10-18

## 2024-10-25 RX ORDER — BUDESONIDE AND FORMOTEROL FUMARATE 160; 4.5 UG/1; UG/1
AEROSOL, METERED RESPIRATORY (INHALATION)
COMMUNITY
Start: 2024-10-22

## 2024-10-25 NOTE — PROGRESS NOTES
Office Note     Name: Sumit Echeverria    : 1965     MRN: [id number]     Chief Complaint  Follow-up of the Right Elbow (Closed nondisplaced fracture of neck of right radius DOI 24. States his pain has increased in the last two weeks, he has pain down into his hand.)    Subjective     History of Present Illness:  Sumit Echeverria is a 59 y.o. male presenting today for right elbow follow-up.  Patient states that he fell approximately 1-1/2 weeks ago and landed on his right elbow.  Ever since he has had increased pain.  He denies any new symptoms other than increased pain.  He denies any other injuries.  He has been keeping his elbow in a sling and keeping it wrapped with an Ace bandage.    Patient does request additional pain medicine today.  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  It also appears that he has been visiting multiple providers to obtain opiate pain medication.  I advised the patient that I will no longer prescribe him opiate pain medication and any additional opiate pain medication will need to come from his pain specialist Dr. Lee.  Patient does have an implanted morphine pump that was implanted by Dr. Lee at [facility] in [facility].        Review of Systems     Past Medical History:   Diagnosis Date    Anxiety     Cancer     Depression     Diabetes mellitus     Hyperlipidemia     Hypertension     Migraine         Past Surgical History:   Procedure Laterality Date    AXILLARY LYMPH NODE BIOPSY/EXCISION      BRAIN SURGERY      PAIN PUMP INSERTION/REVISION N/A     Dr. Lee at [place], [place]    SKIN CANCER EXCISION      SKIN CANCER EXCISION      TOE AMPUTATION Left        Family History   Problem Relation Age of Onset    Cancer Father     Hypertension Father     High cholesterol Father     Hypertension Brother        Social History     Socioeconomic History    Marital status: Single    Tobacco Use    Smoking status: Never     Passive exposure: Past    Smokeless tobacco: Never   Vaping Use    Vaping status: Never Used   Substance and Sexual Activity    Alcohol use: Not Currently    Drug use: No    Sexual activity: Defer         Current Outpatient Medications:     Breyna 160-4.5 MCG/ACT inhaler, , Disp: , Rfl:     Rybelsus 7 MG tablet, Take 7 mg by mouth Daily., Disp: , Rfl:     albuterol sulfate  (90 Base) MCG/ACT inhaler, Inhale 2 puffs Every 4 (Four) Hours As Needed for Wheezing or Shortness of Air., Disp: 8 g, Rfl: 5    ALPRAZolam (XANAX) 0.25 MG tablet, Take 1 tablet by mouth 2 (Two) Times a Day As Needed., Disp: , Rfl:     aspirin 81 MG EC tablet, Take  by mouth., Disp: , Rfl:     buPROPion SR (WELLBUTRIN SR) 100 MG 12 hr tablet, Wellbutrin SR, Disp: , Rfl:     butalbital-acetaminophen-caffeine (FIORICET, ESGIC) -40 MG per tablet, butalbital-acetaminophen-caffeine 50 mg-325 mg-40 mg tablet  TK 1 T PO Q 4 H PRF HEADACHES, Disp: , Rfl:     cyclobenzaprine (FLEXERIL) 10 MG tablet, , Disp: , Rfl: 3    diazePAM (VALIUM) 5 MG tablet, TAKE 1 TABLET BY MOUTH TWICE DAILY AS NEEDED FOR SEVERE ANXIETY ONLY, Disp: , Rfl:     DULoxetine (CYMBALTA) 60 MG capsule, Take  by mouth. Take one capsule by mouth daily, Disp: , Rfl:     Emgality 120 MG/ML auto-injector pen, , Disp: , Rfl:     Evolocumab (Repatha SureClick) solution auto-injector SureClick injection, Repatha SureClick 140 mg/mL subcutaneous pen injector, Disp: , Rfl:     ferrous sulfate 324 (65 Fe) MG tablet delayed-release EC tablet, Take 1 tablet by mouth., Disp: , Rfl:     folic acid (FOLVITE) 1 MG tablet, Take 1 tablet by mouth Daily., Disp: , Rfl:     glucose blood test strip, , Disp: , Rfl:     glucose blood test strip, OneTouch Ultra Blue Test Strip, Disp: , Rfl:     HYDROcodone-acetaminophen (NORCO)  MG per tablet, Take  by mouth., Disp: , Rfl:     meclizine (ANTIVERT) 25 MG tablet, TAKE 1 TABLET BY MOUTH TWICE DAILY AS  "NEEDED FOR VERTIGO, Disp: , Rfl:     metFORMIN (GLUCOPHAGE) 1000 MG tablet, Take  by mouth. Take one tablet by mouth twice daily, Disp: , Rfl:     promethazine (PHENERGAN) 25 MG tablet, , Disp: , Rfl:     propranolol (INDERAL) 20 MG tablet, take 1 tablet by mouth twice a day, Disp: , Rfl:     SUMAtriptan (IMITREX) 100 MG tablet, , Disp: , Rfl:     traMADol (ULTRAM) 50 MG tablet, Take 1 tablet by mouth Every 12 (Twelve) Hours As Needed (PRN PAIN)., Disp: 7 tablet, Rfl: 0    Current Facility-Administered Medications:     bupivacaine (MARCAINE) injection 5 mL, 5 mL, Injection, Once, Yael Pires APRN    OnabotulinumtoxinA 155 Units, 155 Units, Intramuscular, Once, Yael Pires APRN    No Known Allergies        Objective   /86   Ht 172.7 cm (67.99\")   Wt 85.3 kg (188 lb)   BMI 28.59 kg/m²            Physical Exam  Right Elbow Exam     Tenderness   The patient is experiencing tenderness in the radial head.     Range of Motion   Extension:  -20   Flexion:  110   Pronation:  -10   Supination:  90     Tests   Varus: negative  Valgus: negative    Other   Erythema: absent  Sensation: normal  Pulse: present           Extremity DVT signs are negative by clinical screen.     Independent Review of Radiographic Studies:    Plain films of the right elbow were done in office today for evaluation of increased pain and fracture healing, comparison views available.  There is a Ousmane type II fracture of the radial head and neck with minimal displacement.  Neovascularization is seen, no significant interval changes from previous exam.  Suspect osteophyte on lateral humeral condyle.    Procedures    Assessment and Plan   Diagnoses and all orders for this visit:    1. Closed nondisplaced fracture of head and neck of right radius with routine healing, subsequent encounter (Primary)  -     XR Elbow 3+ View Right       Discussion of orthopedic goals  Orthopedic activities reviewed and patient expressed appreciation  Regular " exercise as tolerated  Risk, benefits, and merits of treatment alternatives reviewed with the patient and questions answered  Patient guided on mobility and conditioning exercises  Ice, heat, and/or modalities as beneficial  Elevate arm for residual swelling  Weight bearing parameters reviewed  Use brace as instructed  Over-the-counter Tylenol and pain relief creams for pain.  Gentle range of motion of the elbow and wrist to reduce stiffness and promote healing.  Begin weaning off the sling.    Recommendations/Plan:  Exercise, medications, injections, other patient advice, and return appointment as noted.  Patient is encouraged to call or return for any issues or concerns.    Advised patient that I would like to order an MRI of his right elbow for further evaluation of his fracture as well as possible chondral entrapment.  Will need to confirm that his morphine pain pump is compatible with MRI prior to ordering.  If pump is not compatible consider CT of right elbow.  Consider beginning physical therapy at next visit if MRI shows no chondral entrapment.    Return for Follow-up after MRI to discuss results or in 3 weeks whichever comes first..  Patient agreeable to call or return sooner for any concerns.

## 2024-10-28 ENCOUNTER — TELEPHONE (OUTPATIENT)
Dept: ORTHOPEDIC SURGERY | Facility: CLINIC | Age: 59
End: 2024-10-28
Payer: MEDICARE

## 2024-10-28 DIAGNOSIS — M25.521 ARTHRALGIA OF ELBOW, RIGHT: ICD-10-CM

## 2024-10-28 DIAGNOSIS — S52.134D CLOSED NONDISPLACED FRACTURE OF NECK OF RIGHT RADIUS WITH ROUTINE HEALING, SUBSEQUENT ENCOUNTER: Primary | ICD-10-CM

## 2024-10-28 NOTE — TELEPHONE ENCOUNTER
Need to confirm patient's Morphine pump is MRI compatible before ordering MRI, called his pain management doctor, Rios Lee at [phone], had to leave message with nurse

## 2024-10-28 NOTE — TELEPHONE ENCOUNTER
PLEASE FAX RIGHT ELBOW MRI ORDER TO [place] LOCATION OF [facility] / BETTYE CARDONA - FAX # [phone] AS MENTIONED IN 10/25/24 PETRONA ORTIZ OFC NOTE     THANKS

## 2024-10-29 ENCOUNTER — TELEPHONE (OUTPATIENT)
Dept: SURGERY | Facility: CLINIC | Age: 59
End: 2024-10-29

## 2024-10-30 ENCOUNTER — TELEPHONE (OUTPATIENT)
Dept: ORTHOPEDIC SURGERY | Facility: CLINIC | Age: 59
End: 2024-10-30
Payer: MEDICARE

## 2024-10-30 NOTE — TELEPHONE ENCOUNTER
Called patient to make follow up for MRI results, also informed him his pain mgmnt requested he follow up with them after MRI to make sure pain pump is functioning correctly although Moni at Dr Rios Lee's stated the pump will detect MRI and automatically shut off then automatically turn back on afterwards, patient in agreement with plan

## 2024-10-30 NOTE — TELEPHONE ENCOUNTER
Received return call from Moni at pain management office, she stated his pain pump is MRI compatible, that it will automatically detect and shut off and turn back on afterwards,she will fax that in writing if you want to go ahead and sign the MRI order  Thanks!

## 2024-11-08 ENCOUNTER — HOSPITAL ENCOUNTER (OUTPATIENT)
Dept: MRI IMAGING | Facility: HOSPITAL | Age: 59
Discharge: HOME | End: 2024-11-08
Payer: MEDICARE

## 2024-11-08 DIAGNOSIS — M25.521 PAIN IN RIGHT ELBOW: ICD-10-CM

## 2024-11-08 PROCEDURE — 73221 MRI JOINT UPR EXTREM W/O DYE: CPT

## 2024-11-12 ENCOUNTER — TELEPHONE (OUTPATIENT)
Dept: ORTHOPEDIC SURGERY | Facility: CLINIC | Age: 59
End: 2024-11-12

## 2024-11-12 DIAGNOSIS — M25.521 ARTHRALGIA OF ELBOW, RIGHT: ICD-10-CM

## 2024-11-12 DIAGNOSIS — S52.134D CLOSED NONDISPLACED FRACTURE OF NECK OF RIGHT RADIUS WITH ROUTINE HEALING, SUBSEQUENT ENCOUNTER: Primary | ICD-10-CM

## 2024-11-12 NOTE — TELEPHONE ENCOUNTER
Patient did not show up for his appointment to discuss the MRI results yesterday so I spoke with him over the telephone today.  I discussed the results with him today.  I discussed the assessment and plan of this patient with Dr. Regino Matthews and he suggested the patient be referred to Dr. Davis for further evaluation and care given the interval worsening of his radial head/neck fracture and significant soft tissue damage.  Referral was sent today.

## 2024-11-12 NOTE — PROGRESS NOTES
Patient referred to Dr. Davis for further evaluation of care given extent of osseous and soft tissue damage with interval worsening of radial neck/head fracture.

## 2024-11-18 ENCOUNTER — HOSPITAL ENCOUNTER (OUTPATIENT)
Dept: CT IMAGING | Facility: HOSPITAL | Age: 59
Discharge: HOME | End: 2024-11-18
Payer: MEDICARE

## 2024-11-18 DIAGNOSIS — R91.1 PULMONARY NODULE: ICD-10-CM

## 2024-11-18 LAB
BUN SERPL-MCNC: 9 MG/DL (ref 6–20)
CREAT SERPL-MCNC: 1.1 MG/DL (ref 0.4–1.2)
GFR SERPLBLD CREATININE-BSD FMLA CKD-EPI: 77 ML/MIN/{1.73_M2}

## 2024-11-18 PROCEDURE — 82565 ASSAY OF CREATININE: CPT

## 2024-11-18 PROCEDURE — 36415 COLL VENOUS BLD VENIPUNCTURE: CPT

## 2024-11-18 PROCEDURE — 84520 ASSAY OF UREA NITROGEN: CPT

## 2024-11-18 PROCEDURE — 6360000004 HC RX CONTRAST MEDICATION: Performed by: FAMILY MEDICINE

## 2024-11-18 PROCEDURE — 71260 CT THORAX DX C+: CPT

## 2024-11-18 RX ORDER — IOPAMIDOL 755 MG/ML
100 INJECTION, SOLUTION INTRAVASCULAR
Status: COMPLETED | OUTPATIENT
Start: 2024-11-18 | End: 2024-11-18

## 2024-11-18 RX ADMIN — IOPAMIDOL 100 ML: 755 INJECTION, SOLUTION INTRAVENOUS at 11:40

## 2024-11-27 ENCOUNTER — OFFICE VISIT (OUTPATIENT)
Dept: PULMONOLOGY | Facility: CLINIC | Age: 59
End: 2024-11-27
Payer: MEDICARE

## 2024-11-27 VITALS
HEART RATE: 124 BPM | OXYGEN SATURATION: 96 % | WEIGHT: 180 LBS | HEIGHT: 69 IN | DIASTOLIC BLOOD PRESSURE: 79 MMHG | BODY MASS INDEX: 26.66 KG/M2 | SYSTOLIC BLOOD PRESSURE: 100 MMHG

## 2024-11-27 DIAGNOSIS — J45.40 MODERATE PERSISTENT ASTHMA WITHOUT COMPLICATION: ICD-10-CM

## 2024-11-27 DIAGNOSIS — R93.89 ABNORMAL CHEST CT: Primary | ICD-10-CM

## 2024-11-27 DIAGNOSIS — R00.0 TACHYCARDIA: ICD-10-CM

## 2024-11-27 DIAGNOSIS — J47.9 BRONCHIECTASIS WITHOUT ACUTE EXACERBATION: ICD-10-CM

## 2024-11-27 PROCEDURE — 99214 OFFICE O/P EST MOD 30 MIN: CPT | Performed by: NURSE PRACTITIONER

## 2024-11-27 RX ORDER — ROSUVASTATIN CALCIUM 20 MG/1
20 TABLET, COATED ORAL EVERY EVENING
COMMUNITY
Start: 2024-11-18

## 2024-11-27 RX ORDER — IBUPROFEN 600 MG/1
TABLET, FILM COATED ORAL
COMMUNITY

## 2024-11-27 NOTE — PROGRESS NOTES
"     Follow Up Office Visit      Patient Name: Sumit Echeverria    Chief Complaint:    Chief Complaint   Patient presents with    Abnormal Imaging       History of Present Illness: Sumit Echeverria is a 59 y.o. male who is here today for follow up of abnormal chest imaging and asthma. Since last visit, he reports that he had PNA over the summer though feels recovered from that. He denies ever having PFTs as previously ordered. He notes that he has been doing \"fair\" and says that his dyspnea has been stable. He uses albuterol ~1x/day. He denies any dysphagia symptoms though reports being told last spring that his esophagus was \"shrinking\". He notes occasional heartburn, but not on a frequent basis. Never smoker.    Duration: Asthma since childhood  Associated Symptoms: No current cough or wheezing, no fevers, no hemoptysis, no unintended weight loss  Supplemental Oxygen: No  Cardiac History: Tachycardia  Occupational hx: Worked at a bank for 30 years    Subjective      Review of Systems:  Review of Systems   Constitutional:  Negative for fever and unexpected weight change.   Respiratory:  Positive for shortness of breath. Negative for cough and wheezing.    Cardiovascular:  Negative for chest pain and leg swelling.   Gastrointestinal:  Negative for nausea and vomiting.   Musculoskeletal:  Positive for back pain (Chronic).   Allergic/Immunologic: Positive for environmental allergies.        Past Medical History:   Past Medical History:   Diagnosis Date    Anxiety     Cancer     Depression     Diabetes mellitus     Hyperlipidemia     Hypertension     Migraine        Past Surgical History:   Past Surgical History:   Procedure Laterality Date    AXILLARY LYMPH NODE BIOPSY/EXCISION      BRAIN SURGERY      PAIN PUMP INSERTION/REVISION N/A 2023    Dr. Lee at [place], [place]    SKIN CANCER EXCISION  2014    SKIN CANCER EXCISION  2022    TOE AMPUTATION Left 2022       Family History:   Family History "   Problem Relation Age of Onset    Cancer Father     Hypertension Father     High cholesterol Father     Hypertension Brother        Social History:   Social History     Socioeconomic History    Marital status: Single   Tobacco Use    Smoking status: Never     Passive exposure: Past    Smokeless tobacco: Never   Vaping Use    Vaping status: Never Used   Substance and Sexual Activity    Alcohol use: Not Currently    Drug use: No    Sexual activity: Defer       Current Medications:     Current Outpatient Medications:     albuterol sulfate  (90 Base) MCG/ACT inhaler, Inhale 2 puffs Every 4 (Four) Hours As Needed for Wheezing or Shortness of Air., Disp: 8 g, Rfl: 5    aspirin 81 MG EC tablet, Take  by mouth., Disp: , Rfl:     Breyna 160-4.5 MCG/ACT inhaler, , Disp: , Rfl:     buPROPion SR (WELLBUTRIN SR) 100 MG 12 hr tablet, Wellbutrin SR, Disp: , Rfl:     cyclobenzaprine (FLEXERIL) 10 MG tablet, , Disp: , Rfl: 3    DULoxetine (CYMBALTA) 60 MG capsule, Take  by mouth. Take one capsule by mouth daily, Disp: , Rfl:     ferrous sulfate 324 (65 Fe) MG tablet delayed-release EC tablet, Take 1 tablet by mouth., Disp: , Rfl:     folic acid (FOLVITE) 1 MG tablet, Take 1 tablet by mouth Daily., Disp: , Rfl:     glucose blood test strip, , Disp: , Rfl:     glucose blood test strip, OneTouch Ultra Blue Test Strip, Disp: , Rfl:     HYDROcodone-acetaminophen (NORCO)  MG per tablet, Take  by mouth., Disp: , Rfl:     ibuprofen (ADVIL,MOTRIN) 600 MG tablet, TAKE 1 TABLET BY MOUTH IN THE MORNING AND AT BEDTIME FOR 10 DAYS, Disp: , Rfl:     meclizine (ANTIVERT) 25 MG tablet, TAKE 1 TABLET BY MOUTH TWICE DAILY AS NEEDED FOR VERTIGO, Disp: , Rfl:     metFORMIN (GLUCOPHAGE) 1000 MG tablet, Take  by mouth. Take one tablet by mouth twice daily, Disp: , Rfl:     propranolol (INDERAL) 20 MG tablet, take 1 tablet by mouth twice a day, Disp: , Rfl:     rosuvastatin (CRESTOR) 20 MG tablet, Take 1 tablet by mouth Every Evening., Disp:  ", Rfl:     Rybelsus 7 MG tablet, Take 7 mg by mouth Daily., Disp: , Rfl:     SUMAtriptan (IMITREX) 100 MG tablet, , Disp: , Rfl:     Emgality 120 MG/ML auto-injector pen, , Disp: , Rfl:     promethazine (PHENERGAN) 25 MG tablet, , Disp: , Rfl:     traMADol (ULTRAM) 50 MG tablet, Take 1 tablet by mouth Every 12 (Twelve) Hours As Needed (PRN PAIN). (Patient not taking: Reported on 11/27/2024), Disp: 7 tablet, Rfl: 0    Current Facility-Administered Medications:     bupivacaine (MARCAINE) injection 5 mL, 5 mL, Injection, Once, Yael Pires APRN    OnabotulinumtoxinA 155 Units, 155 Units, Intramuscular, Once, Yael Pires APRN     Allergies:   No Known Allergies    Objective     Physical Exam:  Vital Signs:   Vitals:    11/27/24 1257   BP: 100/79   Pulse: (!) 124  Comment: checked twice with same result   SpO2: 96%   Weight: 81.6 kg (180 lb)  Comment: per patient   Height: 174 cm (68.5\")     Body mass index is 26.97 kg/m².    Physical Exam  Vitals reviewed.   Constitutional:       General: He is not in acute distress.     Appearance: He is not toxic-appearing.   HENT:      Head: Normocephalic and atraumatic.      Mouth/Throat:      Mouth: Mucous membranes are moist.   Eyes:      Extraocular Movements: Extraocular movements intact.      Conjunctiva/sclera: Conjunctivae normal.   Cardiovascular:      Rate and Rhythm: Regular rhythm. Tachycardia present.      Heart sounds: Normal heart sounds.   Pulmonary:      Effort: Pulmonary effort is normal.      Breath sounds: Normal breath sounds.   Abdominal:      General: There is no distension.      Palpations: Abdomen is soft.   Musculoskeletal:         General: No swelling.      Cervical back: Neck supple.   Skin:     General: Skin is warm and dry.      Findings: No rash.   Neurological:      General: No focal deficit present.      Mental Status: He is alert and oriented to person, place, and time.   Psychiatric:         Mood and Affect: Mood normal.         Behavior: " Behavior normal.       Results Review:   September 2024 chest CT scan showed focal bronchiectasis and consolidation in the right middle lobe, appears overall similar compared to prior.  Multifocal right greater than left tree-in-bud/miliary nodules are mildly improved.  Findings favor infectious or inflammatory etiology, including aspiration.  Stable mildly enlarged mediastinal lymph nodes.  Debris-filled esophagus.    November 2024 chest CT scan showed essentially stable pulmonary findings including bronchiectasis and bronchial wall thickening of the right middle lobe, multifocal groundglass nodules and tree-in-bud/miliary nodules.  Stable mediastinal and hilar lymphadenopathy.    Assessment / Plan      Assessment/Plan:   Diagnoses and all orders for this visit:    1. Abnormal chest CT (Primary)  -     CT Chest Without Contrast Diagnostic; Future    2. Bronchiectasis without acute exacerbation  -     Alpha - 1 - Antitrypsin Deficiency; Future    3. Moderate persistent asthma without complication    4. Tachycardia      Chest CT findings have been consistent with bronchiectasis, likely from chronic aspiration. Would only proceed with bronchoscopy if new/worsening findings are noted. The patient's case was discussed with Dr. Yael Pina, who agrees with the plan of care. After visit, records from [facility] were able to be reviewed, showing admission for PNA treatment in June, at which time he was d/c home on Omnicef and azithromycin. Then in June, he was seen in the ED for an accidental overdose of heroin at which time he was given Narcan. If he develops symptoms of dysphagia, swallow eval should be pursued, though aspiration concern is more related to sedating substances as he does note having a pain pump intact. The patient has been counseled on the need to use sedating medications cautiously, as this can worsen their respiratory status, and illicit drug use, including snorting heroin should be absolutely  "avoided. Asthma symptoms are improved. Continue Breyna as maintenance regimen. We discussed the risk and benefits of inhaled corticosteroids. Patient instructed to take them on a regular basis as prescribed. Patient instructed to rinse their mouth out after each use. Will ask the  to arrange his PFTs. Regarding his tachycardia, he notes that this is chronic and known to him and that he has seen cardiology in the past for it. He says that he is \"keyed up\" today, which likely contributes. He was advised to follow up with his PCP or cardiologist for HR management.    Follow Up:   Return in about 1 year (around 11/27/2025) for Recheck.  The patient was counseled on diagnostic results, risks and benefits of treatment options, risk factor modifications and the importance of treatment compliance. The patient was advised to contact the clinic with concerns or worsening symptoms.     MARCELLE Wong   [facility] [place]     This document has been electronically signed by MARCELLE Wong  November 27, 2024     "

## 2024-12-10 DIAGNOSIS — J45.40 MODERATE PERSISTENT ASTHMA WITHOUT COMPLICATION: ICD-10-CM

## 2024-12-10 DIAGNOSIS — R05.3 CHRONIC COUGH: ICD-10-CM

## 2024-12-16 ENCOUNTER — TELEPHONE (OUTPATIENT)
Dept: SURGERY | Facility: CLINIC | Age: 59
End: 2024-12-16
Payer: MEDICARE

## 2024-12-16 NOTE — TELEPHONE ENCOUNTER
Called patient to reschedule missed appointment on 11/21/2024.  States he will call back when he is ready to reschedule.

## 2025-01-13 ENCOUNTER — TRANSCRIBE ORDERS (OUTPATIENT)
Dept: ADMINISTRATIVE | Facility: HOSPITAL | Age: 60
End: 2025-01-13
Payer: MEDICARE

## 2025-01-13 DIAGNOSIS — E05.90 HYPERTHYROIDISM: Primary | ICD-10-CM

## 2025-01-14 RX ORDER — BUDESONIDE AND FORMOTEROL FUMARATE 160; 4.5 UG/1; UG/1
2 AEROSOL, METERED RESPIRATORY (INHALATION) 2 TIMES DAILY
Qty: 10.2 G | Refills: 2 | Status: SHIPPED | OUTPATIENT
Start: 2025-01-14

## 2025-01-15 DIAGNOSIS — J47.9 BRONCHIECTASIS WITHOUT ACUTE EXACERBATION: ICD-10-CM

## 2025-02-26 ENCOUNTER — APPOINTMENT (OUTPATIENT)
Dept: GENERAL RADIOLOGY | Facility: HOSPITAL | Age: 60
End: 2025-02-26
Attending: EMERGENCY MEDICINE
Payer: MEDICARE

## 2025-02-26 ENCOUNTER — HOSPITAL ENCOUNTER (EMERGENCY)
Facility: HOSPITAL | Age: 60
Discharge: HOME | End: 2025-02-26
Attending: EMERGENCY MEDICINE
Payer: MEDICARE

## 2025-02-26 VITALS
WEIGHT: 180 LBS | OXYGEN SATURATION: 98 % | SYSTOLIC BLOOD PRESSURE: 117 MMHG | BODY MASS INDEX: 27.28 KG/M2 | TEMPERATURE: 98.2 F | HEIGHT: 68 IN | DIASTOLIC BLOOD PRESSURE: 80 MMHG | RESPIRATION RATE: 18 BRPM | HEART RATE: 114 BPM

## 2025-02-26 DIAGNOSIS — S52.121A CLOSED DISPLACED FRACTURE OF HEAD OF RIGHT RADIUS, INITIAL ENCOUNTER: Primary | ICD-10-CM

## 2025-02-26 PROCEDURE — 29105 APPLICATION LONG ARM SPLINT: CPT

## 2025-02-26 PROCEDURE — 6370000000 HC RX 637 (ALT 250 FOR IP): Performed by: EMERGENCY MEDICINE

## 2025-02-26 PROCEDURE — 99284 EMERGENCY DEPT VISIT MOD MDM: CPT

## 2025-02-26 PROCEDURE — 96372 THER/PROPH/DIAG INJ SC/IM: CPT

## 2025-02-26 PROCEDURE — 6360000002 HC RX W HCPCS: Performed by: EMERGENCY MEDICINE

## 2025-02-26 PROCEDURE — 73070 X-RAY EXAM OF ELBOW: CPT

## 2025-02-26 RX ORDER — OXYCODONE AND ACETAMINOPHEN 10; 325 MG/1; MG/1
1 TABLET ORAL ONCE
Status: COMPLETED | OUTPATIENT
Start: 2025-02-26 | End: 2025-02-26

## 2025-02-26 RX ORDER — ONDANSETRON 2 MG/ML
4 INJECTION INTRAMUSCULAR; INTRAVENOUS ONCE
Status: COMPLETED | OUTPATIENT
Start: 2025-02-26 | End: 2025-02-26

## 2025-02-26 RX ORDER — OXYCODONE AND ACETAMINOPHEN 10; 325 MG/1; MG/1
1 TABLET ORAL EVERY 6 HOURS PRN
Qty: 12 TABLET | Refills: 0 | Status: SHIPPED | OUTPATIENT
Start: 2025-02-26 | End: 2025-03-01

## 2025-02-26 RX ADMIN — ONDANSETRON 4 MG: 2 INJECTION INTRAMUSCULAR; INTRAVENOUS at 20:48

## 2025-02-26 RX ADMIN — HYDROMORPHONE HYDROCHLORIDE 0.5 MG: 1 INJECTION, SOLUTION INTRAMUSCULAR; INTRAVENOUS; SUBCUTANEOUS at 20:48

## 2025-02-26 RX ADMIN — OXYCODONE AND ACETAMINOPHEN 1 TABLET: 325; 10 TABLET ORAL at 21:16

## 2025-02-26 ASSESSMENT — PAIN DESCRIPTION - LOCATION: LOCATION: ELBOW

## 2025-02-26 ASSESSMENT — PAIN DESCRIPTION - FREQUENCY: FREQUENCY: CONTINUOUS

## 2025-02-26 ASSESSMENT — PAIN DESCRIPTION - ORIENTATION: ORIENTATION: RIGHT

## 2025-02-26 ASSESSMENT — PAIN DESCRIPTION - PAIN TYPE: TYPE: ACUTE PAIN

## 2025-02-26 ASSESSMENT — PAIN - FUNCTIONAL ASSESSMENT: PAIN_FUNCTIONAL_ASSESSMENT: 0-10

## 2025-02-26 ASSESSMENT — PAIN SCALES - GENERAL: PAINLEVEL_OUTOF10: 8

## 2025-02-27 NOTE — ED NOTES
Dc instructions given to patient at this time, patient also given a copy of his xry disc and advised to follow up with ortho in am. Patient to  rx in am for percocet, patient and family with no other questions or concerns.

## 2025-02-27 NOTE — DISCHARGE INSTRUCTIONS
Please take the pain pills prescribed as directed, keep right upper extremity splinted in a sling, as discussed, call one of the orthopedic surgeons listed, and schedule a follow-up appointment.    If any new/acute symptoms or worsening of current presentation please go to the closest urgent care or emergency room for prompt reevaluation.

## 2025-02-28 ASSESSMENT — ENCOUNTER SYMPTOMS
EYE ITCHING: 0
DIARRHEA: 0
WHEEZING: 0
CHEST TIGHTNESS: 0
COUGH: 0
NAUSEA: 0
SINUS PRESSURE: 0
VOMITING: 0
EYE PAIN: 0
CHOKING: 0
EYE REDNESS: 0
TROUBLE SWALLOWING: 0
RHINORRHEA: 0
SORE THROAT: 0
SHORTNESS OF BREATH: 0

## 2025-02-28 NOTE — ED PROVIDER NOTES
[facility]  EMERGENCY DEPARTMENT ENCOUNTER        Pt Name: Tato Thornton  MRN: [id number]  Birthdate 1965  Date of evaluation: 2/26/2025  Provider: Nicolas Granger MD  PCP: Kimmie Bradley PA-C  Note Started: 1:40 AM EST 2/28/25    CHIEF COMPLAINT       Chief Complaint   Patient presents with    Elbow Injury       HISTORY OF PRESENT ILLNESS: 1 or more Elements     History from : Patient    Limitations to history : None    Tato Thornton is a 59 y.o. male with medical history pertinent for hypertension, hypogonadism, erectile dysfunction, asthma, chronic headaches, memory loss, elevated LFTs, depression, chronic low back pain and s/p melanoma.    Patient stated that he tripped and fell earlier today injuring his right elbow.  He denies any focal neurological deficit in the right upper extremity.  No neck pain, no head injury, no LOC.  No recent travel.  No recent exposure to sick contacts.      Nursing Notes were all reviewed and agreed with or any disagreements were addressed in the HPI.    REVIEW OF SYSTEMS :      Review of Systems   Constitutional:  Negative for chills, diaphoresis, fatigue and fever.   HENT:  Negative for drooling, ear discharge, ear pain, postnasal drip, rhinorrhea, sinus pressure, sneezing, sore throat, tinnitus and trouble swallowing.    Eyes:  Negative for pain, redness and itching.   Respiratory:  Negative for cough, choking, chest tightness, shortness of breath and wheezing.    Cardiovascular:  Negative for chest pain.   Gastrointestinal:  Negative for diarrhea, nausea and vomiting.   Endocrine: Negative for polydipsia and polyphagia.   Genitourinary:  Negative for dysuria, frequency, genital sores, hematuria and urgency.   Musculoskeletal:  Positive for arthralgias (right elbow). Negative for gait problem.   Skin:  Negative for rash.   Neurological:  Negative for seizures, syncope and headaches.   Hematological:  Negative for

## 2025-03-02 ENCOUNTER — HOSPITAL ENCOUNTER (EMERGENCY)
Facility: HOSPITAL | Age: 60
Discharge: HOME | End: 2025-03-02
Attending: EMERGENCY MEDICINE
Payer: MEDICARE

## 2025-03-02 VITALS
SYSTOLIC BLOOD PRESSURE: 104 MMHG | WEIGHT: 180 LBS | TEMPERATURE: 98.4 F | HEIGHT: 68 IN | DIASTOLIC BLOOD PRESSURE: 78 MMHG | BODY MASS INDEX: 27.28 KG/M2 | RESPIRATION RATE: 20 BRPM | OXYGEN SATURATION: 100 % | HEART RATE: 103 BPM

## 2025-03-02 DIAGNOSIS — G43.109 MIGRAINE WITH AURA AND WITHOUT STATUS MIGRAINOSUS, NOT INTRACTABLE: Primary | ICD-10-CM

## 2025-03-02 PROCEDURE — 6360000002 HC RX W HCPCS: Performed by: GENERAL PRACTICE

## 2025-03-02 PROCEDURE — 2580000003 HC RX 258: Performed by: GENERAL PRACTICE

## 2025-03-02 PROCEDURE — 96366 THER/PROPH/DIAG IV INF ADDON: CPT

## 2025-03-02 PROCEDURE — 96375 TX/PRO/DX INJ NEW DRUG ADDON: CPT

## 2025-03-02 PROCEDURE — 99284 EMERGENCY DEPT VISIT MOD MDM: CPT

## 2025-03-02 PROCEDURE — 96365 THER/PROPH/DIAG IV INF INIT: CPT

## 2025-03-02 PROCEDURE — 6360000002 HC RX W HCPCS: Performed by: EMERGENCY MEDICINE

## 2025-03-02 RX ORDER — KETOROLAC TROMETHAMINE 15 MG/ML
15 INJECTION, SOLUTION INTRAMUSCULAR; INTRAVENOUS ONCE
Status: COMPLETED | OUTPATIENT
Start: 2025-03-02 | End: 2025-03-02

## 2025-03-02 RX ORDER — LORAZEPAM 2 MG/ML
0.5 INJECTION INTRAMUSCULAR ONCE
Status: COMPLETED | OUTPATIENT
Start: 2025-03-02 | End: 2025-03-02

## 2025-03-02 RX ORDER — 0.9 % SODIUM CHLORIDE 0.9 %
1000 INTRAVENOUS SOLUTION INTRAVENOUS ONCE
Status: COMPLETED | OUTPATIENT
Start: 2025-03-02 | End: 2025-03-02

## 2025-03-02 RX ORDER — PROCHLORPERAZINE EDISYLATE 5 MG/ML
10 INJECTION INTRAMUSCULAR; INTRAVENOUS ONCE
Status: COMPLETED | OUTPATIENT
Start: 2025-03-02 | End: 2025-03-02

## 2025-03-02 RX ADMIN — LORAZEPAM 0.5 MG: 2 INJECTION INTRAMUSCULAR; INTRAVENOUS at 08:05

## 2025-03-02 RX ADMIN — Medication 25 MG: at 06:42

## 2025-03-02 RX ADMIN — SODIUM CHLORIDE 1000 ML: 9 INJECTION, SOLUTION INTRAVENOUS at 08:03

## 2025-03-02 RX ADMIN — KETOROLAC TROMETHAMINE 15 MG: 15 INJECTION, SOLUTION INTRAMUSCULAR; INTRAVENOUS at 06:39

## 2025-03-02 RX ADMIN — PROCHLORPERAZINE EDISYLATE 10 MG: 5 INJECTION INTRAMUSCULAR; INTRAVENOUS at 06:38

## 2025-03-02 ASSESSMENT — PAIN DESCRIPTION - FREQUENCY: FREQUENCY: CONTINUOUS

## 2025-03-02 ASSESSMENT — PAIN DESCRIPTION - DESCRIPTORS: DESCRIPTORS: STABBING

## 2025-03-02 ASSESSMENT — PAIN DESCRIPTION - LOCATION: LOCATION: HEAD

## 2025-03-02 ASSESSMENT — PAIN SCALES - GENERAL
PAINLEVEL_OUTOF10: 9
PAINLEVEL_OUTOF10: 7

## 2025-03-02 ASSESSMENT — PAIN - FUNCTIONAL ASSESSMENT
PAIN_FUNCTIONAL_ASSESSMENT: 0-10
PAIN_FUNCTIONAL_ASSESSMENT: 0-10

## 2025-03-02 ASSESSMENT — PAIN DESCRIPTION - PAIN TYPE: TYPE: ACUTE PAIN

## 2025-03-02 NOTE — ED NOTES
AVS reviewed with patient. Discussed recommended follow up with PCP. Advised patient to return to the ED with any worsening sx. Patient verbalized understanding, no questions or concerns at this time.

## 2025-03-02 NOTE — ED TRIAGE NOTES
Patient to ED with \"migraine\", ongoing for 4 hours, he states he took Imitrex at home but it did not help.

## 2025-03-02 NOTE — ED PROVIDER NOTES
138           PHYSICAL EXAM  1 or more Elements     ED Triage Vitals   BP Systolic BP Percentile Diastolic BP Percentile Temp Temp src Pulse Resp SpO2   -- -- -- -- -- -- -- --      Height Weight         -- --             Physical Exam    My pulse oximetry interpretation is 96%which is within the normal range    GENERAL APPEARANCE: Awake and alert. Cooperative. No acute distress.  HEAD:  Atraumatic.  EYES: EOM's grossly intact.   HEART: Tachycardic rate and rhythm  LUNGS: Respirations unlabored. CTAB  ABDOMEN: Soft. Non-tender. No guarding or rebound.  EXTREMITIES: No acute deformities.  SKIN: Warm and dry.  NEUROLOGICAL:  Alert and oriented sensation motor function intact all extremities cranial nerves II through XII intact  PSYCHIATRIC: Normal mood.        DIAGNOSTIC RESULTS   LABS:    Labs Reviewed - No data to display    When ordered only abnormal lab results are displayed. All other labs were within normal range or not returned as of this dictation.    EKG:     RADIOLOGY:   Non-plain film images such as CT, Ultrasound and MRI are read by the radiologist. Plain radiographic images are visualized and preliminarily interpreted by the ED Provider with the below findings:        Interpretation per the Radiologist below, if available at the time of this note:    No orders to display     XR ELBOW RIGHT (2 VIEWS)    Result Date: 2/26/2025  3 views of the right elbow HISTORY: Pain. FINDINGS: There is a displaced, intra-articular fracture of the radial head. There is an effusion in the elbow. No other fractures are seen. Ossicle adjacent to the lateral epicondyle of the distal humerus compatible with an old injury.     1. Displaced, intra-articular fracture of the radial head. Electronically signed by Octavio Lund DO      No results found.    PROCEDURES   Unless otherwise noted below, none     Procedures    CRITICAL CARE TIME       EMERGENCY DEPARTMENT COURSE and DIFFERENTIAL DIAGNOSIS/MDM:   Vitals:    Vitals:

## 2025-03-31 RX ORDER — BUDESONIDE AND FORMOTEROL FUMARATE 160; 4.5 UG/1; UG/1
2 AEROSOL, METERED RESPIRATORY (INHALATION) 2 TIMES DAILY
Qty: 10.3 G | OUTPATIENT
Start: 2025-03-31

## 2025-04-08 ENCOUNTER — HOSPITAL ENCOUNTER (INPATIENT)
Facility: HOSPITAL | Age: 60
LOS: 5 days | Discharge: HOME | DRG: 177 | End: 2025-04-13
Attending: INTERNAL MEDICINE | Admitting: INTERNAL MEDICINE
Payer: MEDICARE

## 2025-04-08 ENCOUNTER — APPOINTMENT (OUTPATIENT)
Dept: CT IMAGING | Facility: HOSPITAL | Age: 60
DRG: 177 | End: 2025-04-08
Attending: INTERNAL MEDICINE
Payer: MEDICARE

## 2025-04-08 ENCOUNTER — APPOINTMENT (OUTPATIENT)
Dept: GENERAL RADIOLOGY | Facility: HOSPITAL | Age: 60
DRG: 177 | End: 2025-04-08
Payer: MEDICARE

## 2025-04-08 DIAGNOSIS — J96.01 ACUTE RESPIRATORY FAILURE WITH HYPOXIA: ICD-10-CM

## 2025-04-08 DIAGNOSIS — U07.1 COVID-19: Primary | ICD-10-CM

## 2025-04-08 DIAGNOSIS — J18.9 COMMUNITY ACQUIRED PNEUMONIA, UNSPECIFIED LATERALITY: ICD-10-CM

## 2025-04-08 PROBLEM — J96.00 ACUTE RESPIRATORY FAILURE DUE TO COVID-19: Status: ACTIVE | Noted: 2025-04-08

## 2025-04-08 LAB
ALBUMIN SERPL-MCNC: 4 G/DL (ref 3.4–4.8)
ALBUMIN/GLOB SERPL: 1.3 {RATIO} (ref 0.8–2)
ALP SERPL-CCNC: 43 U/L (ref 25–100)
ALT SERPL-CCNC: 12 U/L (ref 4–36)
AMPHET UR QL SCN: ABNORMAL
ANION GAP SERPL CALCULATED.3IONS-SCNC: 13 MMOL/L (ref 3–16)
AST SERPL-CCNC: 18 U/L (ref 8–33)
BARBITURATES UR QL SCN: ABNORMAL
BASE EXCESS BLDA CALC-SCNC: -2.5 MMOL/L (ref -3–3)
BASOPHILS # BLD: 0.1 K/UL (ref 0–0.1)
BASOPHILS NFR BLD: 0.5 %
BENZODIAZ UR QL SCN: ABNORMAL
BILIRUB SERPL-MCNC: 0.4 MG/DL (ref 0.3–1.2)
BILIRUB UR QL STRIP.AUTO: NEGATIVE
BUN SERPL-MCNC: 13 MG/DL (ref 6–20)
BUPRENORPHINE QUAL, URINE: POSITIVE
CALCIUM SERPL-MCNC: 9.4 MG/DL (ref 8.3–10.6)
CANNABINOIDS UR QL SCN: ABNORMAL
CHLORIDE SERPL-SCNC: 98 MMOL/L (ref 98–107)
CLARITY UR: CLEAR
CO2 BLDA-SCNC: 23.4 MMOL/L (ref 24–30)
CO2 SERPL-SCNC: 21 MMOL/L (ref 20–30)
COCAINE UR QL SCN: ABNORMAL
COLOR UR: YELLOW
CREAT SERPL-MCNC: 1.3 MG/DL (ref 0.4–1.2)
CRP SERPL-MCNC: 224 MG/L (ref 0–5.1)
D DIMER PPP DDU-MCNC: >4 UG/ML FEU (ref 0–0.6)
DRUG SCREEN COMMENT UR-IMP: ABNORMAL
EOSINOPHIL # BLD: 0 K/UL (ref 0–0.4)
EOSINOPHIL NFR BLD: 0.2 %
ERYTHROCYTE [DISTWIDTH] IN BLOOD BY AUTOMATED COUNT: 13.6 % (ref 11–16)
ERYTHROCYTE [SEDIMENTATION RATE] IN BLOOD BY WESTERGREN METHOD: 55 MM/HR (ref 0–20)
FENTANYL SCREEN, URINE: NORMAL
FLUAV AG NPH QL: NEGATIVE
FLUBV AG NPH QL: NEGATIVE
GFR SERPLBLD CREATININE-BSD FMLA CKD-EPI: 63 ML/MIN/{1.73_M2}
GLOBULIN SER CALC-MCNC: 3.2 G/DL
GLUCOSE BLD-MCNC: 228 MG/DL (ref 74–106)
GLUCOSE SERPL-MCNC: 401 MG/DL (ref 74–106)
GLUCOSE UR STRIP.AUTO-MCNC: 500 MG/DL
HCO3 BLDA-SCNC: 22.2 MMOL/L (ref 22–26)
HCT VFR BLD AUTO: 34.1 % (ref 40–54)
HGB BLD-MCNC: 11 G/DL (ref 13–18)
HGB UR QL STRIP.AUTO: NEGATIVE
IMM GRANULOCYTES # BLD: 0.2 K/UL
IMM GRANULOCYTES NFR BLD: 1 % (ref 0–5)
INHALED O2 FLOW RATE: 0.21 %
KETONES UR STRIP.AUTO-MCNC: NEGATIVE MG/DL
LACTATE BLDV-SCNC: 2.9 MMOL/L (ref 0.4–2)
LEUKOCYTE ESTERASE UR QL STRIP.AUTO: NEGATIVE
LYMPHOCYTES # BLD: 1.6 K/UL (ref 1.5–4)
LYMPHOCYTES NFR BLD: 10.8 %
MCH RBC QN AUTO: 27.9 PG (ref 27–32)
MCHC RBC AUTO-ENTMCNC: 32.3 G/DL (ref 31–35)
MCV RBC AUTO: 86.5 FL (ref 80–100)
METHADONE UR QL SCN: ABNORMAL
METHAMPHET UR QL SCN: ABNORMAL
MONOCYTES # BLD: 0.8 K/UL (ref 0.2–0.8)
MONOCYTES NFR BLD: 5.4 %
NEUTROPHILS # BLD: 11.9 K/UL (ref 2–7.5)
NEUTS SEG NFR BLD: 82.1 %
NITRITE UR QL STRIP.AUTO: NEGATIVE
NT-PROBNP SERPL-MCNC: 355 PG/ML (ref 0–1800)
O2 THERAPY: ABNORMAL
OPIATES UR QL SCN: POSITIVE
OXYCODONE UR QL SCN: POSITIVE
PCO2 BLDA: 38.3 MMHG (ref 35–45)
PCP UR QL SCN: ABNORMAL
PERFORMED ON: ABNORMAL
PH BLDA: 7.38 [PH] (ref 7.35–7.45)
PH UR STRIP.AUTO: 6 [PH] (ref 5–8)
PLATELET # BLD AUTO: 260 K/UL (ref 150–400)
PMV BLD AUTO: 9.9 FL (ref 6–10)
PO2 BLDA: 58.8 MMHG (ref 80–100)
POTASSIUM SERPL-SCNC: 4.5 MMOL/L (ref 3.4–5.1)
PROCALCITONIN SERPL IA-MCNC: 29.4 NG/ML (ref 0–0.15)
PROT SERPL-MCNC: 7.2 G/DL (ref 6.4–8.3)
PROT UR STRIP.AUTO-MCNC: NEGATIVE MG/DL
RBC # BLD AUTO: 3.94 M/UL (ref 4.5–6)
SAO2 % BLDA: 87.7 %
SARS-COV-2 RDRP RESP QL NAA+PROBE: DETECTED
SODIUM SERPL-SCNC: 132 MMOL/L (ref 136–145)
SP GR UR STRIP.AUTO: 1.01 (ref 1–1.03)
TRICYCLICS UR QL SCN: POSITIVE
UA DIPSTICK W REFLEX MICRO PNL UR: ABNORMAL
URN SPEC COLLECT METH UR: ABNORMAL
UROBILINOGEN UR STRIP-ACNC: 0.2 E.U./DL
WBC # BLD AUTO: 14.5 K/UL (ref 4–11)

## 2025-04-08 PROCEDURE — 36415 COLL VENOUS BLD VENIPUNCTURE: CPT

## 2025-04-08 PROCEDURE — 6360000002 HC RX W HCPCS: Performed by: INTERNAL MEDICINE

## 2025-04-08 PROCEDURE — 6370000000 HC RX 637 (ALT 250 FOR IP): Performed by: INTERNAL MEDICINE

## 2025-04-08 PROCEDURE — 84145 PROCALCITONIN (PCT): CPT

## 2025-04-08 PROCEDURE — XW0DXM6 INTRODUCTION OF BARICITINIB INTO MOUTH AND PHARYNX, EXTERNAL APPROACH, NEW TECHNOLOGY GROUP 6: ICD-10-PCS | Performed by: INTERNAL MEDICINE

## 2025-04-08 PROCEDURE — 87804 INFLUENZA ASSAY W/OPTIC: CPT

## 2025-04-08 PROCEDURE — 86140 C-REACTIVE PROTEIN: CPT

## 2025-04-08 PROCEDURE — 81003 URINALYSIS AUTO W/O SCOPE: CPT

## 2025-04-08 PROCEDURE — 96365 THER/PROPH/DIAG IV INF INIT: CPT

## 2025-04-08 PROCEDURE — 85025 COMPLETE CBC W/AUTO DIFF WBC: CPT

## 2025-04-08 PROCEDURE — 2700000000 HC OXYGEN THERAPY PER DAY

## 2025-04-08 PROCEDURE — 80053 COMPREHEN METABOLIC PANEL: CPT

## 2025-04-08 PROCEDURE — 80307 DRUG TEST PRSMV CHEM ANLYZR: CPT

## 2025-04-08 PROCEDURE — 2580000003 HC RX 258: Performed by: INTERNAL MEDICINE

## 2025-04-08 PROCEDURE — 82803 BLOOD GASES ANY COMBINATION: CPT

## 2025-04-08 PROCEDURE — 94761 N-INVAS EAR/PLS OXIMETRY MLT: CPT

## 2025-04-08 PROCEDURE — 83605 ASSAY OF LACTIC ACID: CPT

## 2025-04-08 PROCEDURE — 85652 RBC SED RATE AUTOMATED: CPT

## 2025-04-08 PROCEDURE — 96375 TX/PRO/DX INJ NEW DRUG ADDON: CPT

## 2025-04-08 PROCEDURE — 36600 WITHDRAWAL OF ARTERIAL BLOOD: CPT

## 2025-04-08 PROCEDURE — 1200000000 HC SEMI PRIVATE

## 2025-04-08 PROCEDURE — 6360000004 HC RX CONTRAST MEDICATION: Performed by: INTERNAL MEDICINE

## 2025-04-08 PROCEDURE — 85379 FIBRIN DEGRADATION QUANT: CPT

## 2025-04-08 PROCEDURE — 87040 BLOOD CULTURE FOR BACTERIA: CPT

## 2025-04-08 PROCEDURE — 99285 EMERGENCY DEPT VISIT HI MDM: CPT

## 2025-04-08 PROCEDURE — 71275 CT ANGIOGRAPHY CHEST: CPT

## 2025-04-08 PROCEDURE — 71045 X-RAY EXAM CHEST 1 VIEW: CPT

## 2025-04-08 PROCEDURE — 87635 SARS-COV-2 COVID-19 AMP PRB: CPT

## 2025-04-08 PROCEDURE — G0480 DRUG TEST DEF 1-7 CLASSES: HCPCS

## 2025-04-08 PROCEDURE — 83880 ASSAY OF NATRIURETIC PEPTIDE: CPT

## 2025-04-08 RX ORDER — ASPIRIN 81 MG/1
81 TABLET ORAL DAILY
Status: DISCONTINUED | OUTPATIENT
Start: 2025-04-09 | End: 2025-04-13 | Stop reason: HOSPADM

## 2025-04-08 RX ORDER — INSULIN LISPRO 100 [IU]/ML
0-4 INJECTION, SOLUTION INTRAVENOUS; SUBCUTANEOUS
Status: DISCONTINUED | OUTPATIENT
Start: 2025-04-08 | End: 2025-04-09

## 2025-04-08 RX ORDER — ALBUTEROL SULFATE 90 UG/1
2 INHALANT RESPIRATORY (INHALATION) EVERY 4 HOURS PRN
Status: DISCONTINUED | OUTPATIENT
Start: 2025-04-08 | End: 2025-04-13 | Stop reason: HOSPADM

## 2025-04-08 RX ORDER — DEXTROSE MONOHYDRATE 100 MG/ML
INJECTION, SOLUTION INTRAVENOUS CONTINUOUS PRN
Status: DISCONTINUED | OUTPATIENT
Start: 2025-04-08 | End: 2025-04-13 | Stop reason: HOSPADM

## 2025-04-08 RX ORDER — BENZONATATE 100 MG/1
100 CAPSULE ORAL 3 TIMES DAILY PRN
Status: DISCONTINUED | OUTPATIENT
Start: 2025-04-08 | End: 2025-04-13 | Stop reason: HOSPADM

## 2025-04-08 RX ORDER — DEXAMETHASONE SODIUM PHOSPHATE 10 MG/ML
6 INJECTION, SOLUTION INTRA-ARTICULAR; INTRALESIONAL; INTRAMUSCULAR; INTRAVENOUS; SOFT TISSUE ONCE
Status: COMPLETED | OUTPATIENT
Start: 2025-04-08 | End: 2025-04-08

## 2025-04-08 RX ORDER — ACETAMINOPHEN 650 MG/1
650 SUPPOSITORY RECTAL EVERY 6 HOURS PRN
Status: DISCONTINUED | OUTPATIENT
Start: 2025-04-08 | End: 2025-04-13 | Stop reason: HOSPADM

## 2025-04-08 RX ORDER — IOPAMIDOL 755 MG/ML
100 INJECTION, SOLUTION INTRAVASCULAR
Status: COMPLETED | OUTPATIENT
Start: 2025-04-08 | End: 2025-04-08

## 2025-04-08 RX ORDER — IPRATROPIUM BROMIDE AND ALBUTEROL SULFATE 2.5; .5 MG/3ML; MG/3ML
1 SOLUTION RESPIRATORY (INHALATION) ONCE
Status: DISCONTINUED | OUTPATIENT
Start: 2025-04-08 | End: 2025-04-13 | Stop reason: HOSPADM

## 2025-04-08 RX ORDER — ACETAMINOPHEN 325 MG/1
650 TABLET ORAL EVERY 6 HOURS PRN
Status: DISCONTINUED | OUTPATIENT
Start: 2025-04-08 | End: 2025-04-13 | Stop reason: HOSPADM

## 2025-04-08 RX ORDER — POLYETHYLENE GLYCOL 3350 17 G/17G
17 POWDER, FOR SOLUTION ORAL DAILY PRN
Status: DISCONTINUED | OUTPATIENT
Start: 2025-04-08 | End: 2025-04-13 | Stop reason: HOSPADM

## 2025-04-08 RX ORDER — CALCIUM CARBONATE 500 MG/1
500 TABLET, CHEWABLE ORAL 2 TIMES DAILY PRN
Status: DISCONTINUED | OUTPATIENT
Start: 2025-04-08 | End: 2025-04-13 | Stop reason: HOSPADM

## 2025-04-08 RX ORDER — AZITHROMYCIN 250 MG/1
250 TABLET, FILM COATED ORAL DAILY
Status: DISCONTINUED | OUTPATIENT
Start: 2025-04-09 | End: 2025-04-09

## 2025-04-08 RX ORDER — ONDANSETRON 4 MG/1
4 TABLET, ORALLY DISINTEGRATING ORAL EVERY 8 HOURS PRN
Status: DISCONTINUED | OUTPATIENT
Start: 2025-04-08 | End: 2025-04-13 | Stop reason: HOSPADM

## 2025-04-08 RX ORDER — 0.9 % SODIUM CHLORIDE 0.9 %
2500 INTRAVENOUS SOLUTION INTRAVENOUS ONCE
Status: COMPLETED | OUTPATIENT
Start: 2025-04-08 | End: 2025-04-08

## 2025-04-08 RX ORDER — ONDANSETRON 2 MG/ML
4 INJECTION INTRAMUSCULAR; INTRAVENOUS EVERY 6 HOURS PRN
Status: DISCONTINUED | OUTPATIENT
Start: 2025-04-08 | End: 2025-04-13 | Stop reason: HOSPADM

## 2025-04-08 RX ORDER — ENOXAPARIN SODIUM 100 MG/ML
40 INJECTION SUBCUTANEOUS DAILY
Status: DISCONTINUED | OUTPATIENT
Start: 2025-04-09 | End: 2025-04-13 | Stop reason: HOSPADM

## 2025-04-08 RX ORDER — IBUPROFEN 600 MG/1
1 TABLET ORAL PRN
Status: DISCONTINUED | OUTPATIENT
Start: 2025-04-08 | End: 2025-04-13 | Stop reason: HOSPADM

## 2025-04-08 RX ORDER — HYDROCODONE BITARTRATE AND ACETAMINOPHEN 5; 325 MG/1; MG/1
1 TABLET ORAL EVERY 6 HOURS PRN
Status: DISCONTINUED | OUTPATIENT
Start: 2025-04-08 | End: 2025-04-09

## 2025-04-08 RX ADMIN — DEXAMETHASONE SODIUM PHOSPHATE 6 MG: 10 INJECTION INTRAMUSCULAR; INTRAVENOUS at 18:43

## 2025-04-08 RX ADMIN — INSULIN LISPRO 1 UNITS: 100 INJECTION, SOLUTION INTRAVENOUS; SUBCUTANEOUS at 22:33

## 2025-04-08 RX ADMIN — HYDROCODONE BITARTRATE AND ACETAMINOPHEN 1 TABLET: 5; 325 TABLET ORAL at 22:32

## 2025-04-08 RX ADMIN — IOPAMIDOL 100 ML: 755 INJECTION, SOLUTION INTRAVENOUS at 22:14

## 2025-04-08 RX ADMIN — SODIUM CHLORIDE 2500 ML: 0.9 INJECTION, SOLUTION INTRAVENOUS at 18:22

## 2025-04-08 RX ADMIN — CALCIUM CARBONATE 500 MG: 500 TABLET, CHEWABLE ORAL at 23:37

## 2025-04-08 RX ADMIN — CEFTRIAXONE 1000 MG: 1 INJECTION, POWDER, FOR SOLUTION INTRAMUSCULAR; INTRAVENOUS at 19:13

## 2025-04-08 RX ADMIN — BARICITINIB 2 MG: 2 TABLET, FILM COATED ORAL at 22:32

## 2025-04-08 ASSESSMENT — PAIN SCALES - GENERAL: PAINLEVEL_OUTOF10: 7

## 2025-04-08 ASSESSMENT — PAIN - FUNCTIONAL ASSESSMENT: PAIN_FUNCTIONAL_ASSESSMENT: 0-10

## 2025-04-08 ASSESSMENT — PAIN DESCRIPTION - PAIN TYPE: TYPE: ACUTE PAIN

## 2025-04-08 NOTE — ED PROVIDER NOTES
Team Dr. Acosta as above    Social Determinants : None    Chronic Conditions:  has a past medical history of Asthma, Cancer (HCC), Chronic back pain, Depression, Elevated liver enzymes, Erectile dysfunction, Headache, Hyperlipidemia, Hypertension, Hypogonadism, Melanoma (HCC), Memory loss, S/P Botox injection, and Type II or unspecified type diabetes mellitus without mention of complication, not stated as uncontrolled.    Records Reviewed : None    I am the Primary Clinician of Record.    FINAL IMPRESSION      1. COVID-19    2. Acute respiratory failure with hypoxia    3. Community acquired pneumonia, unspecified laterality          DISPOSITION/PLAN     DISPOSITION Decision To Admit 04/08/2025 07:02:35 PM   DISPOSITION CONDITION Stable           PATIENT REFERRED TO:  No follow-up provider specified.    DISCHARGE MEDICATIONS:  New Prescriptions    No medications on file       DISCONTINUED MEDICATIONS:  Discontinued Medications    No medications on file              (Please note that portions of this note were completed with a voice recognition program.  Efforts were made to edit the dictations but occasionally words are mis-transcribed.)    Pan Casarez DO (electronically signed)           Pan Casarez DO  04/08/25 4444

## 2025-04-08 NOTE — ED NOTES
Prompted pt to see if he could pee, pt states that he cannot, stated that probably can when his fluids get finished running

## 2025-04-09 PROBLEM — Z87.09 HISTORY OF ASTHMA: Status: ACTIVE | Noted: 2025-04-09

## 2025-04-09 PROBLEM — R00.0 TACHYCARDIA: Status: ACTIVE | Noted: 2025-04-09

## 2025-04-09 PROBLEM — R82.5 POSITIVE URINE DRUG SCREEN: Status: ACTIVE | Noted: 2025-04-09

## 2025-04-09 LAB
25(OH)D3 SERPL-MCNC: 10.8 NG/ML (ref 32–100)
ALBUMIN SERPL-MCNC: 3.6 G/DL (ref 3.4–4.8)
ALBUMIN/GLOB SERPL: 1.2 {RATIO} (ref 0.8–2)
ALP SERPL-CCNC: 39 U/L (ref 25–100)
ALT SERPL-CCNC: 10 U/L (ref 4–36)
ANION GAP SERPL CALCULATED.3IONS-SCNC: 9 MMOL/L (ref 3–16)
AST SERPL-CCNC: 12 U/L (ref 8–33)
BASOPHILS # BLD: 0 K/UL (ref 0–0.1)
BASOPHILS NFR BLD: 0.3 %
BILIRUB SERPL-MCNC: <0.2 MG/DL (ref 0.3–1.2)
BUN SERPL-MCNC: 11 MG/DL (ref 6–20)
CALCIUM SERPL-MCNC: 9.1 MG/DL (ref 8.3–10.6)
CHLORIDE SERPL-SCNC: 105 MMOL/L (ref 98–107)
CO2 SERPL-SCNC: 22 MMOL/L (ref 20–30)
CREAT SERPL-MCNC: 1 MG/DL (ref 0.4–1.2)
CRP SERPL-MCNC: 276 MG/L (ref 0–5.1)
EOSINOPHIL # BLD: 0 K/UL (ref 0–0.4)
EOSINOPHIL NFR BLD: 0 %
ERYTHROCYTE [DISTWIDTH] IN BLOOD BY AUTOMATED COUNT: 13.2 % (ref 11–16)
ERYTHROCYTE [SEDIMENTATION RATE] IN BLOOD BY WESTERGREN METHOD: 35 MM/HR (ref 0–20)
FOLATE SERPL-MCNC: >20 NG/ML
GFR SERPLBLD CREATININE-BSD FMLA CKD-EPI: 86 ML/MIN/{1.73_M2}
GLOBULIN SER CALC-MCNC: 3.1 G/DL
GLUCOSE BLD-MCNC: 250 MG/DL (ref 74–106)
GLUCOSE BLD-MCNC: 291 MG/DL (ref 74–106)
GLUCOSE BLD-MCNC: 316 MG/DL (ref 74–106)
GLUCOSE BLD-MCNC: 417 MG/DL (ref 74–106)
GLUCOSE BLD-MCNC: 424 MG/DL (ref 74–106)
GLUCOSE SERPL-MCNC: 297 MG/DL (ref 74–106)
HBA1C MFR BLD: 7.3 %
HCT VFR BLD AUTO: 30.8 % (ref 40–54)
HGB BLD-MCNC: 9.9 G/DL (ref 13–18)
IMM GRANULOCYTES # BLD: 0.1 K/UL
IMM GRANULOCYTES NFR BLD: 0.9 % (ref 0–5)
LACTATE BLDV-SCNC: 2 MMOL/L (ref 0.4–2)
LYMPHOCYTES # BLD: 0.6 K/UL (ref 1.5–4)
LYMPHOCYTES NFR BLD: 7.5 %
MCH RBC QN AUTO: 27.7 PG (ref 27–32)
MCHC RBC AUTO-ENTMCNC: 32.1 G/DL (ref 31–35)
MCV RBC AUTO: 86 FL (ref 80–100)
MONOCYTES # BLD: 0.2 K/UL (ref 0.2–0.8)
MONOCYTES NFR BLD: 2.2 %
NEUTROPHILS # BLD: 7.6 K/UL (ref 2–7.5)
NEUTS SEG NFR BLD: 89.1 %
PERFORMED ON: ABNORMAL
PLATELET # BLD AUTO: 230 K/UL (ref 150–400)
PMV BLD AUTO: 9.9 FL (ref 6–10)
POTASSIUM SERPL-SCNC: 4.5 MMOL/L (ref 3.4–5.1)
PROCALCITONIN SERPL IA-MCNC: 21.3 NG/ML (ref 0–0.15)
PROT SERPL-MCNC: 6.7 G/DL (ref 6.4–8.3)
RBC # BLD AUTO: 3.58 M/UL (ref 4.5–6)
SODIUM SERPL-SCNC: 136 MMOL/L (ref 136–145)
TSH SERPL-MCNC: 0.37 UIU/ML (ref 0.27–4.2)
VIT B12 SERPL-MCNC: 1610 PG/ML (ref 211–911)
WBC # BLD AUTO: 8.6 K/UL (ref 4–11)

## 2025-04-09 PROCEDURE — 83036 HEMOGLOBIN GLYCOSYLATED A1C: CPT

## 2025-04-09 PROCEDURE — 6360000002 HC RX W HCPCS: Performed by: INTERNAL MEDICINE

## 2025-04-09 PROCEDURE — 92523 SPEECH SOUND LANG COMPREHEN: CPT

## 2025-04-09 PROCEDURE — 92610 EVALUATE SWALLOWING FUNCTION: CPT

## 2025-04-09 PROCEDURE — 82607 VITAMIN B-12: CPT

## 2025-04-09 PROCEDURE — 99223 1ST HOSP IP/OBS HIGH 75: CPT | Performed by: INTERNAL MEDICINE

## 2025-04-09 PROCEDURE — 82746 ASSAY OF FOLIC ACID SERUM: CPT

## 2025-04-09 PROCEDURE — 83550 IRON BINDING TEST: CPT

## 2025-04-09 PROCEDURE — 1200000000 HC SEMI PRIVATE

## 2025-04-09 PROCEDURE — 2580000003 HC RX 258: Performed by: INTERNAL MEDICINE

## 2025-04-09 PROCEDURE — 83540 ASSAY OF IRON: CPT

## 2025-04-09 PROCEDURE — 6370000000 HC RX 637 (ALT 250 FOR IP): Performed by: INTERNAL MEDICINE

## 2025-04-09 PROCEDURE — 94761 N-INVAS EAR/PLS OXIMETRY MLT: CPT

## 2025-04-09 PROCEDURE — 86140 C-REACTIVE PROTEIN: CPT

## 2025-04-09 PROCEDURE — 82728 ASSAY OF FERRITIN: CPT

## 2025-04-09 PROCEDURE — 82306 VITAMIN D 25 HYDROXY: CPT

## 2025-04-09 PROCEDURE — 84443 ASSAY THYROID STIM HORMONE: CPT

## 2025-04-09 PROCEDURE — 94667 MNPJ CHEST WALL 1ST: CPT

## 2025-04-09 PROCEDURE — 84145 PROCALCITONIN (PCT): CPT

## 2025-04-09 PROCEDURE — 6370000000 HC RX 637 (ALT 250 FOR IP): Performed by: PHYSICIAN ASSISTANT

## 2025-04-09 PROCEDURE — 85025 COMPLETE CBC W/AUTO DIFF WBC: CPT

## 2025-04-09 PROCEDURE — 6360000002 HC RX W HCPCS: Performed by: PHYSICIAN ASSISTANT

## 2025-04-09 PROCEDURE — 2700000000 HC OXYGEN THERAPY PER DAY

## 2025-04-09 PROCEDURE — 85652 RBC SED RATE AUTOMATED: CPT

## 2025-04-09 PROCEDURE — 80053 COMPREHEN METABOLIC PANEL: CPT

## 2025-04-09 PROCEDURE — 36415 COLL VENOUS BLD VENIPUNCTURE: CPT

## 2025-04-09 PROCEDURE — 83605 ASSAY OF LACTIC ACID: CPT

## 2025-04-09 RX ORDER — GUAIFENESIN 600 MG/1
600 TABLET, EXTENDED RELEASE ORAL 2 TIMES DAILY
Status: DISCONTINUED | OUTPATIENT
Start: 2025-04-09 | End: 2025-04-13 | Stop reason: HOSPADM

## 2025-04-09 RX ORDER — MECLIZINE HYDROCHLORIDE 25 MG/1
25 TABLET ORAL 2 TIMES DAILY PRN
COMMUNITY

## 2025-04-09 RX ORDER — HYDROCODONE BITARTRATE AND ACETAMINOPHEN 10; 325 MG/1; MG/1
1 TABLET ORAL EVERY 6 HOURS PRN
Refills: 0 | Status: DISCONTINUED | OUTPATIENT
Start: 2025-04-09 | End: 2025-04-09

## 2025-04-09 RX ORDER — PANTOPRAZOLE SODIUM 40 MG/1
40 TABLET, DELAYED RELEASE ORAL
Status: DISCONTINUED | OUTPATIENT
Start: 2025-04-10 | End: 2025-04-13 | Stop reason: HOSPADM

## 2025-04-09 RX ORDER — PROPRANOLOL HCL 20 MG
20 TABLET ORAL 2 TIMES DAILY
Status: DISCONTINUED | OUTPATIENT
Start: 2025-04-09 | End: 2025-04-13 | Stop reason: HOSPADM

## 2025-04-09 RX ORDER — HYDROCODONE BITARTRATE AND ACETAMINOPHEN 7.5; 325 MG/1; MG/1
1 TABLET ORAL EVERY 6 HOURS PRN
Refills: 0 | Status: DISCONTINUED | OUTPATIENT
Start: 2025-04-09 | End: 2025-04-13 | Stop reason: HOSPADM

## 2025-04-09 RX ORDER — AZITHROMYCIN 250 MG/1
500 TABLET, FILM COATED ORAL DAILY
Status: DISCONTINUED | OUTPATIENT
Start: 2025-04-09 | End: 2025-04-13 | Stop reason: HOSPADM

## 2025-04-09 RX ORDER — DEXAMETHASONE SODIUM PHOSPHATE 10 MG/ML
6 INJECTION, SOLUTION INTRA-ARTICULAR; INTRALESIONAL; INTRAMUSCULAR; INTRAVENOUS; SOFT TISSUE EVERY 24 HOURS
Status: DISCONTINUED | OUTPATIENT
Start: 2025-04-09 | End: 2025-04-09

## 2025-04-09 RX ORDER — BUPROPION HYDROCHLORIDE 75 MG/1
75 TABLET ORAL 2 TIMES DAILY
Status: DISCONTINUED | OUTPATIENT
Start: 2025-04-09 | End: 2025-04-13 | Stop reason: HOSPADM

## 2025-04-09 RX ORDER — ROSUVASTATIN CALCIUM 20 MG/1
20 TABLET, COATED ORAL DAILY
Status: DISCONTINUED | OUTPATIENT
Start: 2025-04-09 | End: 2025-04-13 | Stop reason: HOSPADM

## 2025-04-09 RX ORDER — CYCLOBENZAPRINE HCL 10 MG
10 TABLET ORAL 3 TIMES DAILY PRN
Status: DISCONTINUED | OUTPATIENT
Start: 2025-04-09 | End: 2025-04-13 | Stop reason: HOSPADM

## 2025-04-09 RX ORDER — INSULIN LISPRO 100 [IU]/ML
0-16 INJECTION, SOLUTION INTRAVENOUS; SUBCUTANEOUS
Status: DISCONTINUED | OUTPATIENT
Start: 2025-04-09 | End: 2025-04-13 | Stop reason: HOSPADM

## 2025-04-09 RX ORDER — FOLIC ACID 1 MG/1
1 TABLET ORAL DAILY
COMMUNITY
Start: 2024-12-12 | End: 2026-03-06

## 2025-04-09 RX ORDER — DEXAMETHASONE SODIUM PHOSPHATE 10 MG/ML
6 INJECTION, SOLUTION INTRA-ARTICULAR; INTRALESIONAL; INTRAMUSCULAR; INTRAVENOUS; SOFT TISSUE 2 TIMES DAILY
Status: DISCONTINUED | OUTPATIENT
Start: 2025-04-09 | End: 2025-04-13 | Stop reason: HOSPADM

## 2025-04-09 RX ADMIN — HYDROCODONE BITARTRATE AND ACETAMINOPHEN 1 TABLET: 7.5; 325 TABLET ORAL at 23:10

## 2025-04-09 RX ADMIN — BUPROPION HYDROCHLORIDE 75 MG: 75 TABLET, FILM COATED ORAL at 21:03

## 2025-04-09 RX ADMIN — HYDROCODONE BITARTRATE AND ACETAMINOPHEN 1 TABLET: 5; 325 TABLET ORAL at 04:52

## 2025-04-09 RX ADMIN — PROPRANOLOL HYDROCHLORIDE 20 MG: 20 TABLET ORAL at 21:03

## 2025-04-09 RX ADMIN — INSULIN LISPRO 8 UNITS: 100 INJECTION, SOLUTION INTRAVENOUS; SUBCUTANEOUS at 21:10

## 2025-04-09 RX ADMIN — PROPRANOLOL HYDROCHLORIDE 20 MG: 20 TABLET ORAL at 09:58

## 2025-04-09 RX ADMIN — BUPROPION HYDROCHLORIDE 75 MG: 75 TABLET, FILM COATED ORAL at 09:58

## 2025-04-09 RX ADMIN — CYCLOBENZAPRINE 10 MG: 10 TABLET, FILM COATED ORAL at 18:10

## 2025-04-09 RX ADMIN — DEXAMETHASONE SODIUM PHOSPHATE 6 MG: 10 INJECTION INTRAMUSCULAR; INTRAVENOUS at 21:02

## 2025-04-09 RX ADMIN — INSULIN LISPRO 2 UNITS: 100 INJECTION, SOLUTION INTRAVENOUS; SUBCUTANEOUS at 08:38

## 2025-04-09 RX ADMIN — GUAIFENESIN 600 MG: 600 TABLET ORAL at 09:58

## 2025-04-09 RX ADMIN — DEXAMETHASONE SODIUM PHOSPHATE 6 MG: 10 INJECTION INTRAMUSCULAR; INTRAVENOUS at 08:15

## 2025-04-09 RX ADMIN — INSULIN LISPRO 12 UNITS: 100 INJECTION, SOLUTION INTRAVENOUS; SUBCUTANEOUS at 17:18

## 2025-04-09 RX ADMIN — HYDROCODONE BITARTRATE AND ACETAMINOPHEN 1 TABLET: 7.5; 325 TABLET ORAL at 17:19

## 2025-04-09 RX ADMIN — CEFTRIAXONE 1000 MG: 1 INJECTION, POWDER, FOR SOLUTION INTRAMUSCULAR; INTRAVENOUS at 21:10

## 2025-04-09 RX ADMIN — CALCIUM CARBONATE 500 MG: 500 TABLET, CHEWABLE ORAL at 21:33

## 2025-04-09 RX ADMIN — ASPIRIN 81 MG: 81 TABLET, COATED ORAL at 08:15

## 2025-04-09 RX ADMIN — GUAIFENESIN 600 MG: 600 TABLET ORAL at 21:02

## 2025-04-09 RX ADMIN — ENOXAPARIN SODIUM 40 MG: 100 INJECTION SUBCUTANEOUS at 08:15

## 2025-04-09 RX ADMIN — BARICITINIB 2 MG: 2 TABLET, FILM COATED ORAL at 08:16

## 2025-04-09 RX ADMIN — AZITHROMYCIN DIHYDRATE 500 MG: 250 TABLET ORAL at 08:16

## 2025-04-09 RX ADMIN — HYDROCODONE BITARTRATE AND ACETAMINOPHEN 1 TABLET: 7.5; 325 TABLET ORAL at 11:18

## 2025-04-09 RX ADMIN — CALCIUM CARBONATE 500 MG: 500 TABLET, CHEWABLE ORAL at 11:01

## 2025-04-09 RX ADMIN — BENZONATATE 100 MG: 100 CAPSULE ORAL at 18:10

## 2025-04-09 RX ADMIN — INSULIN LISPRO 16 UNITS: 100 INJECTION, SOLUTION INTRAVENOUS; SUBCUTANEOUS at 11:41

## 2025-04-09 ASSESSMENT — PAIN DESCRIPTION - LOCATION
LOCATION: BACK
LOCATION: BACK

## 2025-04-09 ASSESSMENT — PAIN SCALES - GENERAL
PAINLEVEL_OUTOF10: 10
PAINLEVEL_OUTOF10: 10
PAINLEVEL_OUTOF10: 8
PAINLEVEL_OUTOF10: 10

## 2025-04-09 ASSESSMENT — PAIN DESCRIPTION - ORIENTATION: ORIENTATION: LEFT

## 2025-04-09 ASSESSMENT — PAIN DESCRIPTION - DESCRIPTORS: DESCRIPTORS: SHOOTING;SPASM

## 2025-04-09 NOTE — FLOWSHEET NOTE
04/08/25 2030   Assessment   Charting Type Admission   Psychosocial   Psychosocial (WDL) WDL   Neurological   Neuro (WDL) WDL   [place] Coma Scale   Eye Opening 4   Best Verbal Response 5   Best Motor Response 6   [place] Coma Scale Score 15   HEENT (Head, Ears, Eyes, Nose, & Throat)   HEENT (WDL) X   Teeth Missing teeth   Respiratory   Respiratory Pattern Tachypneic   Respiratory Depth Normal   Respiratory Quality/Effort Dyspnea with exertion   Chest Assessment Chest expansion symmetrical   L Breath Sounds Rhonchi   R Breath Sounds Diminished   Level of Activity/Mobility 1   Respiratory (WDL) X   Respiratory Interventions H.O.B. elevated   Breath Sounds   Right Upper Lobe Diminished   Right Middle Lobe Rhonchi   Right Lower Lobe Rhonchi   Left Upper Lobe Diminished   Left Lower Lobe Diminished   Cardiac   Cardiac (WDL) X   Rhythm Interpretation   Pulse (!) 104   Cardiac Monitor   Cardiac/Telemetry Monitor On Portable telemetry pack applied   Telemetry Box Number [id number]   Alarm Audible Yes   Alarms Set Yes   Gastrointestinal   Abdominal (WDL) WDL   Genitourinary   Genitourinary (WDL) WDL   Peripheral Vascular   Peripheral Vascular (WDL) WDL   Edema None   Dual Clinician Skin Assessment   Dual Skin Assessment (4 Eyes) WDL   Skin Integumentary    Skin Color Pale   Skin Condition/Temp Dry   Skin Integrity Scars (comment)  (Cx removal)   Location Upper back   Nails WDL   Skin Integumentary (WDL) X   Musculoskeletal   Musculoskeletal (WDL) X   Musculoskeletal Details   L Toes Surgery  (Tip of 2nd toe missing, healed)   Treatment Team Notification   Reason for Communication Critical results   Type of Critical Result Laboratory   Critical Lab Information D-dimer >4   Person Result Received From [facility]   Critical Lab Result Type D Dimer   Name of Team Member Notified Karla   Treatment Team Role Attending Provider   Method of Communication Secure Message   Response See orders   Notification Time 2100     Aox4. Pt ate a

## 2025-04-09 NOTE — ACP (ADVANCE CARE PLANNING)
Advance Care Planning     General Advance Care Planning (ACP) Conversation    Date of Conversation: 4/9/2025  Conducted with: Patient with Decision Making Capacity  Other persons present: None    Healthcare Decision Maker:   Primary Decision Maker: Mirna Thornton - [facility] - [phone]     Today we documented Decision Maker(s) consistent with Legal Next of Kin hierarchy.  Content/Action Overview:  Has NO ACP documents-Information provided  Reviewed DNR/DNI and patient elects Full Code (Attempt Resuscitation)  artificial nutrition, ventilation preferences, and resuscitation preferences      Length of Voluntary ACP Conversation in minutes:  <16 minutes (Non-Billable)    Trenton Clark

## 2025-04-09 NOTE — CARDIO/PULMONARY
Called to patients room for low SpO2. Patient 83--84% on 5 lpm.  Increased to 6 lpm.    Patient started on AIRVO.  Titrated FiO2 to 50 lpm at 60%.    Will continue to monitor

## 2025-04-09 NOTE — CARDIO/PULMONARY
Patient SpO2 99%.Titrated  AIRVO setting    Current setting  50 lpm 50% FiO2    Patient did have a coughing spell. SpO2 decreased to 86% with good pleth.  Will keep patient on current settings.     Will continue to monitor

## 2025-04-09 NOTE — H&P
History and Physical    Patient:  Tato Thornton    CHIEF COMPLAINT:    Shortness of breath     HISTORY OF PRESENT ILLNESS:   The patient is a 59 y.o. male with PMH of asthma, chronic pain (pain stimulator in place without medication for ~6 months per patient report), drug abuse, HTN, HLD, DM II and others who presents from home due to shortness of breath, cough, chest congestion, myalgias. Symptoms for last 2-3 days. Admits to sweats and chills but did not check his temperature. No n/v/d. Did not try anything for symptoms at home. ER workup revealed acute hypoxic respiratory failure in setting of COVID 19 and known history of asthma. He was admitted for further management. He reports chronic pain in his back and right elbow and asks for pain medication. He is on heated high flow nasal cannula today but in no distress. RT has been weaning oxygen throughout the day. He is on room air at baseline and denies history of KATERINE.     Past Medical History:      Diagnosis Date    Asthma     Cancer (HCC)     melanoma on back    Chronic back pain     Depression     Elevated liver enzymes     Erectile dysfunction     Headache     Hyperlipidemia     Hypertension     Hypogonadism     Melanoma (HCC)     Memory loss     S/P Botox injection     neck x12, face x6    Type II or unspecified type diabetes mellitus without mention of complication, not stated as uncontrolled        Past Surgical History:      Procedure Laterality Date    COLONOSCOPY N/A 10/29/2020    COLORECTAL CANCER SCREENING, NOT HIGH RISK performed by Alexis Montes MD at [facility] ENDOSCOPY    INFUSION PUMP IMPLANTATION  03/2023    morphine pain pump    SKIN CANCER EXCISION      back    TONSILLECTOMY      UPPER GASTROINTESTINAL ENDOSCOPY N/A 10/01/2020    EGD BIOPSY performed by Alexis Montes MD at [facility] ENDOSCOPY       Medications Prior to Admission:    Prior to Admission medications    Medication Sig Start Date End Date Taking? Authorizing Provider   albuterol

## 2025-04-09 NOTE — PLAN OF CARE
Problem: Respiratory - Adult  Goal: Achieves optimal ventilation and oxygenation  Outcome: Progressing     Problem: Cardiovascular - Adult  Goal: Absence of cardiac dysrhythmias or at baseline  Outcome: Progressing     Problem: Skin/Tissue Integrity - Adult  Goal: Skin integrity remains intact  Outcome: Progressing     Problem: Infection - Adult  Goal: Absence of infection at discharge  Outcome: Progressing

## 2025-04-09 NOTE — FLOWSHEET NOTE
04/09/25 0815   Assessment   Charting Type Shift assessment   Psychosocial   Psychosocial (WDL) WDL   Neurological   Neuro (WDL) WDL   [place] Coma Scale   Eye Opening 4   Best Verbal Response 5   Best Motor Response 6   [place] Coma Scale Score 15   HEENT (Head, Ears, Eyes, Nose, & Throat)   HEENT (WDL) X   Teeth Missing teeth   Respiratory   Respiratory Pattern Tachypneic   Respiratory Depth Normal   Respiratory Quality/Effort Dyspnea with exertion   Chest Assessment Chest expansion symmetrical   L Breath Sounds Clear   R Breath Sounds Clear   Respiratory (WDL) X   Cardiac   Cardiac (WDL) X   Cardiac Monitor   Telemetry Box Number [id number]   Alarm Audible Yes   Gastrointestinal   Abdominal (WDL) WDL   Genitourinary   Genitourinary (WDL) WDL   Peripheral Vascular   Peripheral Vascular (WDL) WDL   Edema None   Skin Integumentary    Skin Color Pale   Skin Condition/Temp Warm;Dry   Skin Integumentary (WDL) X   Musculoskeletal   Musculoskeletal (WDL) X   Musculoskeletal Details   L Toes Surgery  (Tip of 2nd toe missing, healed)

## 2025-04-09 NOTE — CARE COORDINATION
Patient refused PT evaluation today secondary to increased pain. Patient requested PT try again tomorrow to see if his pain is more controlled.

## 2025-04-10 PROBLEM — E55.9 VITAMIN D DEFICIENCY: Status: ACTIVE | Noted: 2025-04-10

## 2025-04-10 LAB
ALBUMIN SERPL-MCNC: 3.5 G/DL (ref 3.4–4.8)
ALBUMIN/GLOB SERPL: 1.1 {RATIO} (ref 0.8–2)
ALP SERPL-CCNC: 45 U/L (ref 25–100)
ALT SERPL-CCNC: 10 U/L (ref 4–36)
ANION GAP SERPL CALCULATED.3IONS-SCNC: 11 MMOL/L (ref 3–16)
AST SERPL-CCNC: 11 U/L (ref 8–33)
BILIRUB SERPL-MCNC: <0.2 MG/DL (ref 0.3–1.2)
BUN SERPL-MCNC: 11 MG/DL (ref 6–20)
CALCIUM SERPL-MCNC: 9.1 MG/DL (ref 8.3–10.6)
CHLORIDE SERPL-SCNC: 103 MMOL/L (ref 98–107)
CO2 SERPL-SCNC: 23 MMOL/L (ref 20–30)
CREAT SERPL-MCNC: 1 MG/DL (ref 0.4–1.2)
CRP SERPL-MCNC: 117 MG/L (ref 0–5.1)
ERYTHROCYTE [SEDIMENTATION RATE] IN BLOOD BY WESTERGREN METHOD: 29 MM/HR (ref 0–20)
FERRITIN SERPL IA-MCNC: 161 NG/ML (ref 22–322)
GFR SERPLBLD CREATININE-BSD FMLA CKD-EPI: 86 ML/MIN/{1.73_M2}
GLOBULIN SER CALC-MCNC: 3.1 G/DL
GLUCOSE BLD-MCNC: 274 MG/DL (ref 74–106)
GLUCOSE BLD-MCNC: 288 MG/DL (ref 74–106)
GLUCOSE BLD-MCNC: 303 MG/DL (ref 74–106)
GLUCOSE BLD-MCNC: 332 MG/DL (ref 74–106)
GLUCOSE BLD-MCNC: 387 MG/DL (ref 74–106)
GLUCOSE SERPL-MCNC: 293 MG/DL (ref 74–106)
IRON SATN MFR SERPL: 6 % (ref 20–50)
IRON SERPL-MCNC: 16 UG/DL (ref 59–158)
PERFORMED ON: ABNORMAL
POTASSIUM SERPL-SCNC: 4.4 MMOL/L (ref 3.4–5.1)
PROT SERPL-MCNC: 6.6 G/DL (ref 6.4–8.3)
SODIUM SERPL-SCNC: 137 MMOL/L (ref 136–145)
TIBC SERPL-MCNC: 277 UG/DL (ref 250–450)

## 2025-04-10 PROCEDURE — 99232 SBSQ HOSP IP/OBS MODERATE 35: CPT | Performed by: INTERNAL MEDICINE

## 2025-04-10 PROCEDURE — 6370000000 HC RX 637 (ALT 250 FOR IP): Performed by: NURSE PRACTITIONER

## 2025-04-10 PROCEDURE — 6360000002 HC RX W HCPCS: Performed by: INTERNAL MEDICINE

## 2025-04-10 PROCEDURE — 82728 ASSAY OF FERRITIN: CPT

## 2025-04-10 PROCEDURE — 92526 ORAL FUNCTION THERAPY: CPT

## 2025-04-10 PROCEDURE — 6370000000 HC RX 637 (ALT 250 FOR IP): Performed by: PHYSICIAN ASSISTANT

## 2025-04-10 PROCEDURE — 94761 N-INVAS EAR/PLS OXIMETRY MLT: CPT

## 2025-04-10 PROCEDURE — 36415 COLL VENOUS BLD VENIPUNCTURE: CPT

## 2025-04-10 PROCEDURE — 80053 COMPREHEN METABOLIC PANEL: CPT

## 2025-04-10 PROCEDURE — 1200000000 HC SEMI PRIVATE

## 2025-04-10 PROCEDURE — 86140 C-REACTIVE PROTEIN: CPT

## 2025-04-10 PROCEDURE — 2580000003 HC RX 258: Performed by: INTERNAL MEDICINE

## 2025-04-10 PROCEDURE — 85652 RBC SED RATE AUTOMATED: CPT

## 2025-04-10 PROCEDURE — 92507 TX SP LANG VOICE COMM INDIV: CPT

## 2025-04-10 PROCEDURE — 6360000002 HC RX W HCPCS: Performed by: PHYSICIAN ASSISTANT

## 2025-04-10 PROCEDURE — 6370000000 HC RX 637 (ALT 250 FOR IP): Performed by: INTERNAL MEDICINE

## 2025-04-10 RX ORDER — ERGOCALCIFEROL 1.25 MG/1
50000 CAPSULE, LIQUID FILLED ORAL WEEKLY
Status: DISCONTINUED | OUTPATIENT
Start: 2025-04-10 | End: 2025-04-13 | Stop reason: HOSPADM

## 2025-04-10 RX ORDER — ALOGLIPTIN 6.25 MG/1
6.25 TABLET, FILM COATED ORAL DAILY
Status: DISCONTINUED | OUTPATIENT
Start: 2025-04-10 | End: 2025-04-13 | Stop reason: HOSPADM

## 2025-04-10 RX ORDER — SUCRALFATE 1 G/1
1 TABLET ORAL
Status: DISCONTINUED | OUTPATIENT
Start: 2025-04-10 | End: 2025-04-13 | Stop reason: HOSPADM

## 2025-04-10 RX ORDER — FERROUS SULFATE 324(65)MG
324 TABLET, DELAYED RELEASE (ENTERIC COATED) ORAL
Status: DISCONTINUED | OUTPATIENT
Start: 2025-04-11 | End: 2025-04-13 | Stop reason: HOSPADM

## 2025-04-10 RX ADMIN — BARICITINIB 4 MG: 2 TABLET, FILM COATED ORAL at 08:24

## 2025-04-10 RX ADMIN — HYDROCODONE BITARTRATE AND ACETAMINOPHEN 1 TABLET: 7.5; 325 TABLET ORAL at 06:15

## 2025-04-10 RX ADMIN — DEXAMETHASONE SODIUM PHOSPHATE 6 MG: 10 INJECTION INTRAMUSCULAR; INTRAVENOUS at 08:25

## 2025-04-10 RX ADMIN — HYDROCODONE BITARTRATE AND ACETAMINOPHEN 1 TABLET: 7.5; 325 TABLET ORAL at 20:37

## 2025-04-10 RX ADMIN — CEFTRIAXONE 1000 MG: 1 INJECTION, POWDER, FOR SOLUTION INTRAMUSCULAR; INTRAVENOUS at 20:51

## 2025-04-10 RX ADMIN — BUPROPION HYDROCHLORIDE 75 MG: 75 TABLET, FILM COATED ORAL at 20:39

## 2025-04-10 RX ADMIN — GUAIFENESIN 600 MG: 600 TABLET ORAL at 20:41

## 2025-04-10 RX ADMIN — ASPIRIN 81 MG: 81 TABLET, COATED ORAL at 08:24

## 2025-04-10 RX ADMIN — SUCRALFATE 1 G: 1 TABLET ORAL at 17:08

## 2025-04-10 RX ADMIN — BENZONATATE 100 MG: 100 CAPSULE ORAL at 08:24

## 2025-04-10 RX ADMIN — SUCRALFATE 1 G: 1 TABLET ORAL at 20:39

## 2025-04-10 RX ADMIN — INSULIN LISPRO 16 UNITS: 100 INJECTION, SOLUTION INTRAVENOUS; SUBCUTANEOUS at 21:54

## 2025-04-10 RX ADMIN — INSULIN LISPRO 8 UNITS: 100 INJECTION, SOLUTION INTRAVENOUS; SUBCUTANEOUS at 17:08

## 2025-04-10 RX ADMIN — HYDROCODONE BITARTRATE AND ACETAMINOPHEN 1 TABLET: 7.5; 325 TABLET ORAL at 12:35

## 2025-04-10 RX ADMIN — ALOGLIPTIN 6.25 MG: 6.25 TABLET, FILM COATED ORAL at 12:35

## 2025-04-10 RX ADMIN — BUPROPION HYDROCHLORIDE 75 MG: 75 TABLET, FILM COATED ORAL at 08:25

## 2025-04-10 RX ADMIN — CYCLOBENZAPRINE 10 MG: 10 TABLET, FILM COATED ORAL at 20:39

## 2025-04-10 RX ADMIN — INSULIN LISPRO 12 UNITS: 100 INJECTION, SOLUTION INTRAVENOUS; SUBCUTANEOUS at 11:07

## 2025-04-10 RX ADMIN — DEXAMETHASONE SODIUM PHOSPHATE 6 MG: 10 INJECTION INTRAMUSCULAR; INTRAVENOUS at 20:41

## 2025-04-10 RX ADMIN — ENOXAPARIN SODIUM 40 MG: 100 INJECTION SUBCUTANEOUS at 08:25

## 2025-04-10 RX ADMIN — INSULIN LISPRO 8 UNITS: 100 INJECTION, SOLUTION INTRAVENOUS; SUBCUTANEOUS at 08:25

## 2025-04-10 RX ADMIN — PANTOPRAZOLE SODIUM 40 MG: 40 TABLET, DELAYED RELEASE ORAL at 06:15

## 2025-04-10 RX ADMIN — GUAIFENESIN 600 MG: 600 TABLET ORAL at 08:24

## 2025-04-10 RX ADMIN — BENZONATATE 100 MG: 100 CAPSULE ORAL at 14:36

## 2025-04-10 RX ADMIN — CYCLOBENZAPRINE 10 MG: 10 TABLET, FILM COATED ORAL at 08:24

## 2025-04-10 RX ADMIN — ACETAMINOPHEN 650 MG: 325 TABLET, FILM COATED ORAL at 08:24

## 2025-04-10 RX ADMIN — PROPRANOLOL HYDROCHLORIDE 20 MG: 20 TABLET ORAL at 20:40

## 2025-04-10 RX ADMIN — PROPRANOLOL HYDROCHLORIDE 20 MG: 20 TABLET ORAL at 08:24

## 2025-04-10 RX ADMIN — AZITHROMYCIN DIHYDRATE 500 MG: 250 TABLET ORAL at 08:23

## 2025-04-10 RX ADMIN — ERGOCALCIFEROL 50000 UNITS: 1.25 CAPSULE ORAL at 12:35

## 2025-04-10 ASSESSMENT — PAIN SCALES - GENERAL
PAINLEVEL_OUTOF10: 7
PAINLEVEL_OUTOF10: 9

## 2025-04-10 ASSESSMENT — PAIN DESCRIPTION - DESCRIPTORS: DESCRIPTORS: ACHING;THROBBING

## 2025-04-10 ASSESSMENT — PAIN DESCRIPTION - LOCATION: LOCATION: BACK

## 2025-04-10 NOTE — FLOWSHEET NOTE
04/09/25 2100   Assessment   Charting Type Shift assessment   Psychosocial   Psychosocial (WDL) WDL   Neurological   Neuro (WDL) WDL   [place] Coma Scale   Eye Opening 4   Best Verbal Response 5   Best Motor Response 6   [place] Coma Scale Score 15   HEENT (Head, Ears, Eyes, Nose, & Throat)   HEENT (WDL) X   Teeth Missing teeth   Respiratory   Respiratory Pattern Tachypneic   Respiratory Depth Normal   Respiratory Quality/Effort Dyspnea with exertion   Chest Assessment Chest expansion symmetrical   R Breath Sounds Clear   Respiratory (WDL) X   Respiratory Interventions H.O.B. elevated   Breath Sounds   Right Upper Lobe Diminished   Right Middle Lobe Diminished   Right Lower Lobe Diminished   Left Upper Lobe Diminished   Left Lower Lobe Diminished   Cardiac   Cardiac (WDL) X   Rhythm Interpretation   Pulse 99   Cardiac Monitor   Telemetry Box Number [id number]   Alarm Audible Yes   Gastrointestinal   Abdominal (WDL) WDL   Genitourinary   Genitourinary (WDL) WDL   Peripheral Vascular   Peripheral Vascular (WDL) WDL   Edema None   Skin Integumentary    Skin Color Pale   Skin Condition/Temp Dry;Warm   Skin Integrity Scars (comment)   Location Upper Back   Nails WDL   Skin Integumentary (WDL) X   Musculoskeletal   Musculoskeletal (WDL) X   Musculoskeletal Details   L Toes Surgery  (Tip of 2nd toe missing, healed)

## 2025-04-10 NOTE — CARE COORDINATION
SDOH - Lovenox - Lives with mom.  Has canes.  I at baseline, but mom helps as needed.  Has inhalers.  Needs 6MW prior to DC.  May benefit from therapy vs HH depending on progress.       04/10/25 1225   Service Assessment   Patient Orientation Alert and Oriented   Cognition Alert   History Provided By Patient;Medical Record   Primary Caregiver Self   Support Systems Parent   Patient's Healthcare Decision Maker is: Patient Declined (Legal Next of Kin Remains as Decision Maker)   PCP Verified by CM Yes   Last Visit to PCP Within last 3 months   Prior Functional Level Independent in ADLs/IADLs   Current Functional Level Assistance with the following:  (per therapy eval - pt is weaker than normal due to illness)   Can patient return to prior living arrangement Yes   Ability to make needs known: Good   Family able to assist with home care needs: Yes   Would you like for me to discuss the discharge plan with any other family members/significant others, and if so, who? No   Financial Resources Medicare   Community Resources None   CM/SW Referral Other (see comment)   Discharge Planning   Type of Residence House   Living Arrangements Parent  (mom)   Current Services Prior To Admission Durable Medical Equipment   Current DME Prior to Arrival [facility]   Potential Assistance Needed Durable Medical Equipment   Potential DME Needed Oxygen Therapy (Comment)   DME Ordered? No   Potential Assistance Purchasing Medications No   Type of Home Care Services Nursing Services;Skilled Therapy   Patient expects to be discharged to: House   Follow Up Appointment: Best Day/Time  Monday PM  (any)   One/Two Story Residence One story   History of falls? 0        Vital Signs Stable, No apparent distress noted; Pt tolerated _Venofer/B12 w/o difficulty.  24g piv removed;No bleeding,swelling or drainage noted to the site;coban and gauze applied  Discharge instructions given; answered all questions;Pt verbalizes understanding.   Next appointments scheduled and sent via [employer];  Ambulated from clinic in [facility], accompanied by boyfriend/friend

## 2025-04-10 NOTE — CARE COORDINATION
Attempted OT evaluation.  Pt requests no eval this date d/t pain 9/10 in low back. Pt requested OT come back tomorrow for eval

## 2025-04-10 NOTE — FLOWSHEET NOTE
04/10/25 0825   Assessment   Charting Type Shift assessment   Psychosocial   Psychosocial (WDL) WDL   Neurological   Neuro (WDL) WDL   [place] Coma Scale   Eye Opening 4   Best Verbal Response 5   Best Motor Response 6   [place] Coma Scale Score 15   HEENT (Head, Ears, Eyes, Nose, & Throat)   HEENT (WDL) X   Teeth Missing teeth   Respiratory   Respiratory Pattern Regular   Respiratory Depth Normal   Respiratory Quality/Effort Dyspnea with exertion   Chest Assessment Chest expansion symmetrical   Respiratory (WDL) X   Breath Sounds   Right Upper Lobe Diminished   Right Middle Lobe Diminished   Right Lower Lobe Diminished   Left Upper Lobe Diminished   Left Lower Lobe Diminished   Cardiac   Cardiac (WDL) X   Cardiac Monitor   Telemetry Box Number [id number]   Alarm Audible Yes   Gastrointestinal   Abdominal (WDL) WDL   Genitourinary   Genitourinary (WDL) WDL   Peripheral Vascular   Peripheral Vascular (WDL) WDL   Edema None   Skin Integumentary    Skin Color Pale   Skin Condition/Temp Warm;Dry   Skin Integumentary (WDL) X   Musculoskeletal   Musculoskeletal (WDL) X   Musculoskeletal Details   L Toes Surgery  (Tip of 2nd toe missing, healed)

## 2025-04-11 ENCOUNTER — APPOINTMENT (OUTPATIENT)
Dept: GENERAL RADIOLOGY | Facility: HOSPITAL | Age: 60
DRG: 177 | End: 2025-04-11
Payer: MEDICARE

## 2025-04-11 PROBLEM — K22.89 THICKENING OF ESOPHAGUS: Status: ACTIVE | Noted: 2025-04-11

## 2025-04-11 PROBLEM — R59.0 ADENOPATHY, HILAR: Status: ACTIVE | Noted: 2025-04-11

## 2025-04-11 LAB
ALBUMIN SERPL-MCNC: 3.6 G/DL (ref 3.4–4.8)
ALBUMIN/GLOB SERPL: 1.2 {RATIO} (ref 0.8–2)
ALP SERPL-CCNC: 45 U/L (ref 25–100)
ALT SERPL-CCNC: 13 U/L (ref 4–36)
ANION GAP SERPL CALCULATED.3IONS-SCNC: 10 MMOL/L (ref 3–16)
AST SERPL-CCNC: 14 U/L (ref 8–33)
BILIRUB SERPL-MCNC: <0.2 MG/DL (ref 0.3–1.2)
BUN SERPL-MCNC: 12 MG/DL (ref 6–20)
CALCIUM SERPL-MCNC: 8.8 MG/DL (ref 8.3–10.6)
CHLORIDE SERPL-SCNC: 105 MMOL/L (ref 98–107)
CO2 SERPL-SCNC: 23 MMOL/L (ref 20–30)
CREAT SERPL-MCNC: 1 MG/DL (ref 0.4–1.2)
ERYTHROCYTE [DISTWIDTH] IN BLOOD BY AUTOMATED COUNT: 13.1 % (ref 11–16)
GFR SERPLBLD CREATININE-BSD FMLA CKD-EPI: 86 ML/MIN/{1.73_M2}
GLOBULIN SER CALC-MCNC: 3.1 G/DL
GLUCOSE BLD-MCNC: 170 MG/DL (ref 74–106)
GLUCOSE BLD-MCNC: 233 MG/DL (ref 74–106)
GLUCOSE BLD-MCNC: 292 MG/DL (ref 74–106)
GLUCOSE BLD-MCNC: 317 MG/DL (ref 74–106)
GLUCOSE SERPL-MCNC: 152 MG/DL (ref 74–106)
HCT VFR BLD AUTO: 30.5 % (ref 40–54)
HGB BLD-MCNC: 10 G/DL (ref 13–18)
MCH RBC QN AUTO: 27.9 PG (ref 27–32)
MCHC RBC AUTO-ENTMCNC: 32.8 G/DL (ref 31–35)
MCV RBC AUTO: 85 FL (ref 80–100)
PERFORMED ON: ABNORMAL
PLATELET # BLD AUTO: 297 K/UL (ref 150–400)
PMV BLD AUTO: 9.7 FL (ref 6–10)
POTASSIUM SERPL-SCNC: 4 MMOL/L (ref 3.4–5.1)
PROT SERPL-MCNC: 6.7 G/DL (ref 6.4–8.3)
RBC # BLD AUTO: 3.59 M/UL (ref 4.5–6)
SODIUM SERPL-SCNC: 138 MMOL/L (ref 136–145)
WBC # BLD AUTO: 10.7 K/UL (ref 4–11)

## 2025-04-11 PROCEDURE — 6360000002 HC RX W HCPCS: Performed by: INTERNAL MEDICINE

## 2025-04-11 PROCEDURE — 36415 COLL VENOUS BLD VENIPUNCTURE: CPT

## 2025-04-11 PROCEDURE — 94761 N-INVAS EAR/PLS OXIMETRY MLT: CPT

## 2025-04-11 PROCEDURE — 1200000000 HC SEMI PRIVATE

## 2025-04-11 PROCEDURE — 6360000002 HC RX W HCPCS: Performed by: PHYSICIAN ASSISTANT

## 2025-04-11 PROCEDURE — 71045 X-RAY EXAM CHEST 1 VIEW: CPT

## 2025-04-11 PROCEDURE — 6370000000 HC RX 637 (ALT 250 FOR IP): Performed by: INTERNAL MEDICINE

## 2025-04-11 PROCEDURE — 6370000000 HC RX 637 (ALT 250 FOR IP): Performed by: PHYSICIAN ASSISTANT

## 2025-04-11 PROCEDURE — 85027 COMPLETE CBC AUTOMATED: CPT

## 2025-04-11 PROCEDURE — 99233 SBSQ HOSP IP/OBS HIGH 50: CPT | Performed by: INTERNAL MEDICINE

## 2025-04-11 PROCEDURE — 2580000003 HC RX 258: Performed by: INTERNAL MEDICINE

## 2025-04-11 PROCEDURE — 80053 COMPREHEN METABOLIC PANEL: CPT

## 2025-04-11 PROCEDURE — 6370000000 HC RX 637 (ALT 250 FOR IP): Performed by: NURSE PRACTITIONER

## 2025-04-11 RX ORDER — INSULIN LISPRO 100 [IU]/ML
5 INJECTION, SOLUTION INTRAVENOUS; SUBCUTANEOUS ONCE
Status: COMPLETED | OUTPATIENT
Start: 2025-04-11 | End: 2025-04-11

## 2025-04-11 RX ADMIN — DEXAMETHASONE SODIUM PHOSPHATE 6 MG: 10 INJECTION INTRAMUSCULAR; INTRAVENOUS at 20:32

## 2025-04-11 RX ADMIN — BUPROPION HYDROCHLORIDE 75 MG: 75 TABLET, FILM COATED ORAL at 08:48

## 2025-04-11 RX ADMIN — METFORMIN HYDROCHLORIDE 1000 MG: 500 TABLET ORAL at 17:09

## 2025-04-11 RX ADMIN — INSULIN LISPRO 4 UNITS: 100 INJECTION, SOLUTION INTRAVENOUS; SUBCUTANEOUS at 17:09

## 2025-04-11 RX ADMIN — METFORMIN HYDROCHLORIDE 1000 MG: 500 TABLET ORAL at 08:48

## 2025-04-11 RX ADMIN — GUAIFENESIN 600 MG: 600 TABLET ORAL at 20:32

## 2025-04-11 RX ADMIN — FERROUS SULFATE TAB EC 324 MG (65 MG FE EQUIVALENT) 324 MG: 324 (65 FE) TABLET DELAYED RESPONSE at 08:48

## 2025-04-11 RX ADMIN — INSULIN LISPRO 12 UNITS: 100 INJECTION, SOLUTION INTRAVENOUS; SUBCUTANEOUS at 11:17

## 2025-04-11 RX ADMIN — INSULIN LISPRO 5 UNITS: 100 INJECTION, SOLUTION INTRAVENOUS; SUBCUTANEOUS at 00:26

## 2025-04-11 RX ADMIN — SUCRALFATE 1 G: 1 TABLET ORAL at 20:32

## 2025-04-11 RX ADMIN — AZITHROMYCIN DIHYDRATE 500 MG: 250 TABLET ORAL at 08:48

## 2025-04-11 RX ADMIN — BUPROPION HYDROCHLORIDE 75 MG: 75 TABLET, FILM COATED ORAL at 20:32

## 2025-04-11 RX ADMIN — ENOXAPARIN SODIUM 40 MG: 100 INJECTION SUBCUTANEOUS at 08:49

## 2025-04-11 RX ADMIN — INSULIN LISPRO 8 UNITS: 100 INJECTION, SOLUTION INTRAVENOUS; SUBCUTANEOUS at 20:37

## 2025-04-11 RX ADMIN — PANTOPRAZOLE SODIUM 40 MG: 40 TABLET, DELAYED RELEASE ORAL at 06:47

## 2025-04-11 RX ADMIN — HYDROCODONE BITARTRATE AND ACETAMINOPHEN 1 TABLET: 7.5; 325 TABLET ORAL at 13:07

## 2025-04-11 RX ADMIN — ASPIRIN 81 MG: 81 TABLET, COATED ORAL at 08:48

## 2025-04-11 RX ADMIN — PROPRANOLOL HYDROCHLORIDE 20 MG: 20 TABLET ORAL at 20:33

## 2025-04-11 RX ADMIN — DEXAMETHASONE SODIUM PHOSPHATE 6 MG: 10 INJECTION INTRAMUSCULAR; INTRAVENOUS at 08:49

## 2025-04-11 RX ADMIN — SUCRALFATE 1 G: 1 TABLET ORAL at 17:09

## 2025-04-11 RX ADMIN — GUAIFENESIN 600 MG: 600 TABLET ORAL at 08:48

## 2025-04-11 RX ADMIN — PROPRANOLOL HYDROCHLORIDE 20 MG: 20 TABLET ORAL at 08:48

## 2025-04-11 RX ADMIN — SUCRALFATE 1 G: 1 TABLET ORAL at 11:17

## 2025-04-11 RX ADMIN — CYCLOBENZAPRINE 10 MG: 10 TABLET, FILM COATED ORAL at 13:07

## 2025-04-11 RX ADMIN — SUCRALFATE 1 G: 1 TABLET ORAL at 06:47

## 2025-04-11 RX ADMIN — CYCLOBENZAPRINE 10 MG: 10 TABLET, FILM COATED ORAL at 20:32

## 2025-04-11 RX ADMIN — HYDROCODONE BITARTRATE AND ACETAMINOPHEN 1 TABLET: 7.5; 325 TABLET ORAL at 20:33

## 2025-04-11 RX ADMIN — ALOGLIPTIN 6.25 MG: 6.25 TABLET, FILM COATED ORAL at 08:48

## 2025-04-11 RX ADMIN — CEFTRIAXONE 1000 MG: 1 INJECTION, POWDER, FOR SOLUTION INTRAMUSCULAR; INTRAVENOUS at 18:37

## 2025-04-11 RX ADMIN — HYDROCODONE BITARTRATE AND ACETAMINOPHEN 1 TABLET: 7.5; 325 TABLET ORAL at 06:48

## 2025-04-11 RX ADMIN — BARICITINIB 4 MG: 2 TABLET, FILM COATED ORAL at 08:48

## 2025-04-11 RX ADMIN — CYCLOBENZAPRINE 10 MG: 10 TABLET, FILM COATED ORAL at 06:47

## 2025-04-11 ASSESSMENT — PAIN DESCRIPTION - ORIENTATION: ORIENTATION: LEFT

## 2025-04-11 ASSESSMENT — PAIN SCALES - GENERAL
PAINLEVEL_OUTOF10: 9
PAINLEVEL_OUTOF10: 8
PAINLEVEL_OUTOF10: 9

## 2025-04-11 ASSESSMENT — PAIN DESCRIPTION - DESCRIPTORS
DESCRIPTORS: ACHING;THROBBING
DESCRIPTORS: ACHING;THROBBING

## 2025-04-11 ASSESSMENT — PAIN DESCRIPTION - LOCATION
LOCATION: BACK;ELBOW
LOCATION: BACK
LOCATION: BACK

## 2025-04-11 NOTE — FLOWSHEET NOTE
04/11/25 0848   Assessment   Charting Type Shift assessment   Psychosocial   Psychosocial (WDL) WDL   Neurological   Neuro (WDL) WDL   [place] Coma Scale   Eye Opening 4   Best Verbal Response 5   Best Motor Response 6   [place] Coma Scale Score 15   HEENT (Head, Ears, Eyes, Nose, & Throat)   HEENT (WDL) X   Teeth Missing teeth   Respiratory   Respiratory Pattern Regular   Respiratory Depth Normal   Respiratory Quality/Effort Dyspnea with exertion   Chest Assessment Chest expansion symmetrical   Respiratory (WDL) X   Breath Sounds   Right Upper Lobe Diminished   Right Middle Lobe Diminished   Right Lower Lobe Diminished   Left Upper Lobe Diminished   Left Lower Lobe Diminished   Care Plan - Respiratory Goals   Achieves optimal ventilation and oxygenation Assess for changes in respiratory status   Cardiac   Cardiac (WDL) X   Cardiac Monitor   Cardiac/Telemetry Monitor On Portable telemetry pack applied   Telemetry Box Number [id number]   Alarm Audible Yes   Alarms Set Yes   Care Plan - Cardiovascular Goals   Absence of cardiac dysrhythmias or at baseline Monitor cardiac rate and rhythm   Gastrointestinal   Abdominal (WDL) WDL   Care Plan - Gastrointestinal Goals   Minimal or absence of nausea and vomiting Administer IV fluids as ordered to ensure adequate hydration   Genitourinary   Genitourinary (WDL) WDL   Care Plan - Genitourinary Goals    Absence of urinary retention Assess patient’s ability to void and empty bladder   Peripheral Vascular   Peripheral Vascular (WDL) WDL   Edema None   Skin Integumentary    Skin Color Pale   Skin Condition/Temp Warm;Dry   Skin Integrity Scars (comment)   Location upper back   Skin Integumentary (WDL) X   Care Plan - Skin/Tissue Integrity Goals   Skin Integrity Remains Intact Monitor for areas of redness and/or skin breakdown   Musculoskeletal   Musculoskeletal (WDL) X   Musculoskeletal Details   L Toes Surgery  (Tip of 2nd toe missing, healed)   Care Plan - Infection Goals

## 2025-04-11 NOTE — CARE COORDINATION
No OT evaluation completed.  Pt reports that he is I at baseline and that he has been getting up without assist at the hospital.  He reports that he does not have any needs at this time.

## 2025-04-11 NOTE — CARE COORDINATION
Attempted PT evaluation x 3. Per patient, RN staff and discharge planner, patient is able to perform mobility tasks independently and has any equipment he may need at home. No formal skilled PT evaluation completed on this patient d/t no skilled needs identified.

## 2025-04-11 NOTE — PLAN OF CARE
Problem: Chronic Conditions and Co-morbidities  Goal: Patient's chronic conditions and co-morbidity symptoms are monitored and maintained or improved  Outcome: Progressing  Flowsheets (Taken 4/11/2025 0848)  Care Plan - Patient's Chronic Conditions and Co-Morbidity Symptoms are Monitored and Maintained or Improved: Monitor and assess patient's chronic conditions and comorbid symptoms for stability, deterioration, or improvement     Problem: Discharge Planning  Goal: Discharge to home or other facility with appropriate resources  4/11/2025 0937 by Amanda Antonio RN  Outcome: Progressing  Flowsheets (Taken 4/11/2025 0848)  Discharge to home or other facility with appropriate resources: Identify barriers to discharge with patient and caregiver  4/11/2025 0216 by Avinash Ireland RN  Outcome: Progressing     Problem: Pain  Goal: Verbalizes/displays adequate comfort level or baseline comfort level  Outcome: Progressing     Problem: Safety - Adult  Goal: Free from fall injury  4/11/2025 0937 by Amanda Antonio RN  Outcome: Progressing  4/11/2025 0216 by Avinash Ireland RN  Outcome: Progressing     Problem: ABCDS Injury Assessment  Goal: Absence of physical injury  Outcome: Progressing     Problem: Respiratory - Adult  Goal: Achieves optimal ventilation and oxygenation  4/11/2025 0937 by Amanda Antonio RN  Outcome: Progressing  Flowsheets (Taken 4/11/2025 0848)  Achieves optimal ventilation and oxygenation: Assess for changes in respiratory status  4/11/2025 0216 by Avinash Ireland RN  Outcome: Progressing     Problem: Cardiovascular - Adult  Goal: Absence of cardiac dysrhythmias or at baseline  Outcome: Progressing  Flowsheets (Taken 4/11/2025 0848)  Absence of cardiac dysrhythmias or at baseline: Monitor cardiac rate and rhythm     Problem: Skin/Tissue Integrity - Adult  Goal: Skin integrity remains intact  Outcome: Progressing  Flowsheets (Taken 4/11/2025 0848)  Skin Integrity Remains Intact: Monitor for areas of

## 2025-04-12 LAB
ALBUMIN SERPL-MCNC: 3.5 G/DL (ref 3.4–4.8)
ALBUMIN/GLOB SERPL: 1.2 {RATIO} (ref 0.8–2)
ALP SERPL-CCNC: 43 U/L (ref 25–100)
ALT SERPL-CCNC: 21 U/L (ref 4–36)
ANION GAP SERPL CALCULATED.3IONS-SCNC: 11 MMOL/L (ref 3–16)
AST SERPL-CCNC: 18 U/L (ref 8–33)
BASOPHILS # BLD: 0 K/UL (ref 0–0.1)
BASOPHILS NFR BLD: 0.3 %
BILIRUB SERPL-MCNC: <0.2 MG/DL (ref 0.3–1.2)
BUN SERPL-MCNC: 13 MG/DL (ref 6–20)
CALCIUM SERPL-MCNC: 8.4 MG/DL (ref 8.3–10.6)
CHLORIDE SERPL-SCNC: 103 MMOL/L (ref 98–107)
CO2 SERPL-SCNC: 24 MMOL/L (ref 20–30)
CREAT SERPL-MCNC: 1 MG/DL (ref 0.4–1.2)
CRP SERPL-MCNC: 19.9 MG/L (ref 0–5.1)
EOSINOPHIL # BLD: 0 K/UL (ref 0–0.4)
EOSINOPHIL NFR BLD: 0 %
ERYTHROCYTE [DISTWIDTH] IN BLOOD BY AUTOMATED COUNT: 12.9 % (ref 11–16)
ERYTHROCYTE [SEDIMENTATION RATE] IN BLOOD BY WESTERGREN METHOD: 20 MM/HR (ref 0–20)
GFR SERPLBLD CREATININE-BSD FMLA CKD-EPI: 86 ML/MIN/{1.73_M2}
GLOBULIN SER CALC-MCNC: 2.9 G/DL
GLUCOSE BLD-MCNC: 179 MG/DL (ref 74–106)
GLUCOSE BLD-MCNC: 183 MG/DL (ref 74–106)
GLUCOSE BLD-MCNC: 187 MG/DL (ref 74–106)
GLUCOSE BLD-MCNC: 322 MG/DL (ref 74–106)
GLUCOSE SERPL-MCNC: 184 MG/DL (ref 74–106)
HCT VFR BLD AUTO: 30.3 % (ref 40–54)
HGB BLD-MCNC: 9.9 G/DL (ref 13–18)
IMM GRANULOCYTES # BLD: 0.6 K/UL
IMM GRANULOCYTES NFR BLD: 6.5 % (ref 0–5)
LYMPHOCYTES # BLD: 1.3 K/UL (ref 1.5–4)
LYMPHOCYTES NFR BLD: 14.7 %
MCH RBC QN AUTO: 27.6 PG (ref 27–32)
MCHC RBC AUTO-ENTMCNC: 32.7 G/DL (ref 31–35)
MCV RBC AUTO: 84.4 FL (ref 80–100)
MONOCYTES # BLD: 0.5 K/UL (ref 0.2–0.8)
MONOCYTES NFR BLD: 5.9 %
NEUTROPHILS # BLD: 6.3 K/UL (ref 2–7.5)
NEUTS SEG NFR BLD: 72.6 %
PERFORMED ON: ABNORMAL
PLATELET # BLD AUTO: 294 K/UL (ref 150–400)
PMV BLD AUTO: 9.1 FL (ref 6–10)
POTASSIUM SERPL-SCNC: 4 MMOL/L (ref 3.4–5.1)
PROCALCITONIN SERPL IA-MCNC: 1.45 NG/ML (ref 0–0.15)
PROT SERPL-MCNC: 6.4 G/DL (ref 6.4–8.3)
RBC # BLD AUTO: 3.59 M/UL (ref 4.5–6)
SODIUM SERPL-SCNC: 138 MMOL/L (ref 136–145)
WBC # BLD AUTO: 8.7 K/UL (ref 4–11)

## 2025-04-12 PROCEDURE — 85025 COMPLETE CBC W/AUTO DIFF WBC: CPT

## 2025-04-12 PROCEDURE — 2700000000 HC OXYGEN THERAPY PER DAY

## 2025-04-12 PROCEDURE — 85652 RBC SED RATE AUTOMATED: CPT

## 2025-04-12 PROCEDURE — 2580000003 HC RX 258: Performed by: INTERNAL MEDICINE

## 2025-04-12 PROCEDURE — 99231 SBSQ HOSP IP/OBS SF/LOW 25: CPT | Performed by: INTERNAL MEDICINE

## 2025-04-12 PROCEDURE — 84145 PROCALCITONIN (PCT): CPT

## 2025-04-12 PROCEDURE — 1200000000 HC SEMI PRIVATE

## 2025-04-12 PROCEDURE — 6370000000 HC RX 637 (ALT 250 FOR IP): Performed by: NURSE PRACTITIONER

## 2025-04-12 PROCEDURE — 94761 N-INVAS EAR/PLS OXIMETRY MLT: CPT

## 2025-04-12 PROCEDURE — 86140 C-REACTIVE PROTEIN: CPT

## 2025-04-12 PROCEDURE — 6360000002 HC RX W HCPCS: Performed by: INTERNAL MEDICINE

## 2025-04-12 PROCEDURE — 36415 COLL VENOUS BLD VENIPUNCTURE: CPT

## 2025-04-12 PROCEDURE — 6370000000 HC RX 637 (ALT 250 FOR IP): Performed by: INTERNAL MEDICINE

## 2025-04-12 PROCEDURE — 80053 COMPREHEN METABOLIC PANEL: CPT

## 2025-04-12 PROCEDURE — 6370000000 HC RX 637 (ALT 250 FOR IP): Performed by: PHYSICIAN ASSISTANT

## 2025-04-12 PROCEDURE — 6360000002 HC RX W HCPCS: Performed by: PHYSICIAN ASSISTANT

## 2025-04-12 RX ADMIN — METFORMIN HYDROCHLORIDE 1000 MG: 500 TABLET ORAL at 17:15

## 2025-04-12 RX ADMIN — SUCRALFATE 1 G: 1 TABLET ORAL at 17:15

## 2025-04-12 RX ADMIN — DEXAMETHASONE SODIUM PHOSPHATE 6 MG: 10 INJECTION INTRAMUSCULAR; INTRAVENOUS at 08:33

## 2025-04-12 RX ADMIN — SUCRALFATE 1 G: 1 TABLET ORAL at 11:35

## 2025-04-12 RX ADMIN — BUPROPION HYDROCHLORIDE 75 MG: 75 TABLET, FILM COATED ORAL at 08:32

## 2025-04-12 RX ADMIN — PANTOPRAZOLE SODIUM 40 MG: 40 TABLET, DELAYED RELEASE ORAL at 05:40

## 2025-04-12 RX ADMIN — SUCRALFATE 1 G: 1 TABLET ORAL at 05:41

## 2025-04-12 RX ADMIN — ALOGLIPTIN 6.25 MG: 6.25 TABLET, FILM COATED ORAL at 08:34

## 2025-04-12 RX ADMIN — HYDROCODONE BITARTRATE AND ACETAMINOPHEN 1 TABLET: 7.5; 325 TABLET ORAL at 12:02

## 2025-04-12 RX ADMIN — PROPRANOLOL HYDROCHLORIDE 20 MG: 20 TABLET ORAL at 08:32

## 2025-04-12 RX ADMIN — ENOXAPARIN SODIUM 40 MG: 100 INJECTION SUBCUTANEOUS at 08:33

## 2025-04-12 RX ADMIN — INSULIN LISPRO 4 UNITS: 100 INJECTION, SOLUTION INTRAVENOUS; SUBCUTANEOUS at 20:39

## 2025-04-12 RX ADMIN — FERROUS SULFATE TAB EC 324 MG (65 MG FE EQUIVALENT) 324 MG: 324 (65 FE) TABLET DELAYED RESPONSE at 08:32

## 2025-04-12 RX ADMIN — HYDROCODONE BITARTRATE AND ACETAMINOPHEN 1 TABLET: 7.5; 325 TABLET ORAL at 18:13

## 2025-04-12 RX ADMIN — HYDROCODONE BITARTRATE AND ACETAMINOPHEN 1 TABLET: 7.5; 325 TABLET ORAL at 05:41

## 2025-04-12 RX ADMIN — PROPRANOLOL HYDROCHLORIDE 20 MG: 20 TABLET ORAL at 20:39

## 2025-04-12 RX ADMIN — BUPROPION HYDROCHLORIDE 75 MG: 75 TABLET, FILM COATED ORAL at 20:39

## 2025-04-12 RX ADMIN — CEFTRIAXONE 1000 MG: 1 INJECTION, POWDER, FOR SOLUTION INTRAMUSCULAR; INTRAVENOUS at 20:41

## 2025-04-12 RX ADMIN — BARICITINIB 4 MG: 2 TABLET, FILM COATED ORAL at 08:33

## 2025-04-12 RX ADMIN — INSULIN LISPRO 12 UNITS: 100 INJECTION, SOLUTION INTRAVENOUS; SUBCUTANEOUS at 11:35

## 2025-04-12 RX ADMIN — AZITHROMYCIN DIHYDRATE 500 MG: 250 TABLET ORAL at 08:33

## 2025-04-12 RX ADMIN — GUAIFENESIN 600 MG: 600 TABLET ORAL at 20:38

## 2025-04-12 RX ADMIN — CYCLOBENZAPRINE 10 MG: 10 TABLET, FILM COATED ORAL at 05:41

## 2025-04-12 RX ADMIN — CYCLOBENZAPRINE 10 MG: 10 TABLET, FILM COATED ORAL at 12:02

## 2025-04-12 RX ADMIN — METFORMIN HYDROCHLORIDE 1000 MG: 500 TABLET ORAL at 08:32

## 2025-04-12 RX ADMIN — ASPIRIN 81 MG: 81 TABLET, COATED ORAL at 08:32

## 2025-04-12 RX ADMIN — GUAIFENESIN 600 MG: 600 TABLET ORAL at 08:32

## 2025-04-12 RX ADMIN — DEXAMETHASONE SODIUM PHOSPHATE 6 MG: 10 INJECTION INTRAMUSCULAR; INTRAVENOUS at 20:39

## 2025-04-12 RX ADMIN — SUCRALFATE 1 G: 1 TABLET ORAL at 20:39

## 2025-04-12 ASSESSMENT — PAIN DESCRIPTION - DESCRIPTORS: DESCRIPTORS: ACHING;THROBBING

## 2025-04-12 ASSESSMENT — PAIN SCALES - GENERAL: PAINLEVEL_OUTOF10: 10

## 2025-04-12 ASSESSMENT — PAIN DESCRIPTION - LOCATION: LOCATION: BACK;ELBOW

## 2025-04-12 ASSESSMENT — PAIN DESCRIPTION - ORIENTATION: ORIENTATION: RIGHT

## 2025-04-12 NOTE — FLOWSHEET NOTE
04/12/25 0800   Assessment   Charting Type Shift assessment   Psychosocial   Psychosocial (WDL) WDL   Neurological   Neuro (WDL) WDL   [place] Coma Scale   Eye Opening 4   Best Verbal Response 5   Best Motor Response 6   [place] Coma Scale Score 15   HEENT (Head, Ears, Eyes, Nose, & Throat)   HEENT (WDL) X   Teeth Missing teeth   Respiratory   Respiratory Pattern Regular   Respiratory Depth Normal   Respiratory Quality/Effort Dyspnea with exertion   Chest Assessment Chest expansion symmetrical   L Breath Sounds Clear;Diminished   R Breath Sounds Clear;Diminished   Respiratory (WDL) X   Respiratory Interventions Cough & deep breathe;H.O.B. elevated   Breath Sounds   Right Upper Lobe Clear;Diminished   Right Middle Lobe Clear;Diminished   Right Lower Lobe Clear;Diminished   Left Upper Lobe Clear;Diminished   Left Lower Lobe Clear;Diminished   Care Plan - Respiratory Goals   Achieves optimal ventilation and oxygenation Assess for changes in respiratory status;Assess for changes in mentation and behavior;Position to facilitate oxygenation and minimize respiratory effort;Oxygen supplementation based on oxygen saturation or arterial blood gases;Encourage broncho-pulmonary hygiene including cough, deep breathe, incentive spirometry;Assess the need for suctioning and aspirate as needed;Assess and instruct to report shortness of breath or any respiratory difficulty;Respiratory therapy support as indicated   Cardiac   Cardiac (WDL) X   Cardiac Monitor   Cardiac/Telemetry Monitor On Portable telemetry pack applied   Telemetry Box Number [id number]   Alarm Audible Yes   Care Plan - Cardiovascular Goals   Absence of cardiac dysrhythmias or at baseline Monitor cardiac rate and rhythm;Assess for signs of decreased cardiac output   Gastrointestinal   Abdominal (WDL) WDL   Care Plan - Gastrointestinal Goals   Minimal or absence of nausea and vomiting Nutrition consult to assist patient with adequate nutrition and appropriate food  Responded to patient via my chart

## 2025-04-12 NOTE — PLAN OF CARE
Problem: Chronic Conditions and Co-morbidities  Goal: Patient's chronic conditions and co-morbidity symptoms are monitored and maintained or improved  Outcome: Progressing  Flowsheets (Taken 4/12/2025 0800)  Care Plan - Patient's Chronic Conditions and Co-Morbidity Symptoms are Monitored and Maintained or Improved:   Monitor and assess patient's chronic conditions and comorbid symptoms for stability, deterioration, or improvement   Collaborate with multidisciplinary team to address chronic and comorbid conditions and prevent exacerbation or deterioration   Update acute care plan with appropriate goals if chronic or comorbid symptoms are exacerbated and prevent overall improvement and discharge     Problem: Discharge Planning  Goal: Discharge to home or other facility with appropriate resources  4/12/2025 1004 by Libby Portillo RN  Outcome: Progressing  Flowsheets (Taken 4/12/2025 0800)  Discharge to home or other facility with appropriate resources: Identify barriers to discharge with patient and caregiver  4/12/2025 0020 by Avinash Ireland RN  Outcome: Progressing     Problem: Pain  Goal: Verbalizes/displays adequate comfort level or baseline comfort level  4/12/2025 1004 by Libby Portillo RN  Outcome: Progressing  4/12/2025 0020 by Avinash Ireland RN  Outcome: Progressing     Problem: Safety - Adult  Goal: Free from fall injury  4/12/2025 1004 by Libby Portillo RN  Outcome: Progressing  4/12/2025 0020 by Avinash Ireland RN  Outcome: Progressing     Problem: ABCDS Injury Assessment  Goal: Absence of physical injury  Outcome: Progressing     Problem: Respiratory - Adult  Goal: Achieves optimal ventilation and oxygenation  Outcome: Progressing  Flowsheets (Taken 4/12/2025 0800)  Achieves optimal ventilation and oxygenation:   Assess for changes in respiratory status   Assess for changes in mentation and behavior   Position to facilitate oxygenation and minimize respiratory effort   Oxygen supplementation

## 2025-04-13 VITALS
SYSTOLIC BLOOD PRESSURE: 143 MMHG | RESPIRATION RATE: 16 BRPM | BODY MASS INDEX: 27.55 KG/M2 | TEMPERATURE: 97.3 F | OXYGEN SATURATION: 99 % | HEART RATE: 101 BPM | DIASTOLIC BLOOD PRESSURE: 97 MMHG | HEIGHT: 67 IN | WEIGHT: 175.5 LBS

## 2025-04-13 LAB
ALBUMIN SERPL-MCNC: 3.5 G/DL (ref 3.4–4.8)
ALBUMIN/GLOB SERPL: 1.3 {RATIO} (ref 0.8–2)
ALP SERPL-CCNC: 39 U/L (ref 25–100)
ALT SERPL-CCNC: 18 U/L (ref 4–36)
ANION GAP SERPL CALCULATED.3IONS-SCNC: 11 MMOL/L (ref 3–16)
AST SERPL-CCNC: 12 U/L (ref 8–33)
BACTERIA BLD CULT ORG #2: NORMAL
BASOPHILS # BLD: 0 K/UL (ref 0–0.1)
BASOPHILS NFR BLD: 0.3 %
BILIRUB SERPL-MCNC: <0.2 MG/DL (ref 0.3–1.2)
BUN SERPL-MCNC: 17 MG/DL (ref 6–20)
CALCIUM SERPL-MCNC: 8.3 MG/DL (ref 8.3–10.6)
CHLORIDE SERPL-SCNC: 101 MMOL/L (ref 98–107)
CO2 SERPL-SCNC: 23 MMOL/L (ref 20–30)
CREAT SERPL-MCNC: 1 MG/DL (ref 0.4–1.2)
CRP SERPL-MCNC: 9.2 MG/L (ref 0–5.1)
EOSINOPHIL # BLD: 0 K/UL (ref 0–0.4)
EOSINOPHIL NFR BLD: 0 %
ERYTHROCYTE [DISTWIDTH] IN BLOOD BY AUTOMATED COUNT: 12.9 % (ref 11–16)
ERYTHROCYTE [SEDIMENTATION RATE] IN BLOOD BY WESTERGREN METHOD: 31 MM/HR (ref 0–20)
GFR SERPLBLD CREATININE-BSD FMLA CKD-EPI: 86 ML/MIN/{1.73_M2}
GLOBULIN SER CALC-MCNC: 2.8 G/DL
GLUCOSE BLD-MCNC: 193 MG/DL (ref 74–106)
GLUCOSE BLD-MCNC: 233 MG/DL (ref 74–106)
GLUCOSE BLD-MCNC: 233 MG/DL (ref 74–106)
GLUCOSE SERPL-MCNC: 210 MG/DL (ref 74–106)
HCT VFR BLD AUTO: 32.1 % (ref 40–54)
HGB BLD-MCNC: 10.6 G/DL (ref 13–18)
IMM GRANULOCYTES # BLD: 0.6 K/UL
IMM GRANULOCYTES NFR BLD: 7.1 % (ref 0–5)
LYMPHOCYTES # BLD: 1.2 K/UL (ref 1.5–4)
LYMPHOCYTES NFR BLD: 13.8 %
MCH RBC QN AUTO: 27.8 PG (ref 27–32)
MCHC RBC AUTO-ENTMCNC: 33 G/DL (ref 31–35)
MCV RBC AUTO: 84.3 FL (ref 80–100)
MONOCYTES # BLD: 0.5 K/UL (ref 0.2–0.8)
MONOCYTES NFR BLD: 5.3 %
NEUTROPHILS # BLD: 6.3 K/UL (ref 2–7.5)
NEUTS SEG NFR BLD: 73.5 %
PERFORMED ON: ABNORMAL
PLATELET # BLD AUTO: 327 K/UL (ref 150–400)
PMV BLD AUTO: 8.9 FL (ref 6–10)
POTASSIUM SERPL-SCNC: 4.2 MMOL/L (ref 3.4–5.1)
PROCALCITONIN SERPL IA-MCNC: 0.66 NG/ML (ref 0–0.15)
PROT SERPL-MCNC: 6.3 G/DL (ref 6.4–8.3)
RBC # BLD AUTO: 3.81 M/UL (ref 4.5–6)
SODIUM SERPL-SCNC: 135 MMOL/L (ref 136–145)
WBC # BLD AUTO: 8.6 K/UL (ref 4–11)

## 2025-04-13 PROCEDURE — 94618 PULMONARY STRESS TESTING: CPT

## 2025-04-13 PROCEDURE — 86140 C-REACTIVE PROTEIN: CPT

## 2025-04-13 PROCEDURE — 6370000000 HC RX 637 (ALT 250 FOR IP): Performed by: PHYSICIAN ASSISTANT

## 2025-04-13 PROCEDURE — 85025 COMPLETE CBC W/AUTO DIFF WBC: CPT

## 2025-04-13 PROCEDURE — 6360000002 HC RX W HCPCS: Performed by: PHYSICIAN ASSISTANT

## 2025-04-13 PROCEDURE — 94761 N-INVAS EAR/PLS OXIMETRY MLT: CPT

## 2025-04-13 PROCEDURE — 93005 ELECTROCARDIOGRAM TRACING: CPT

## 2025-04-13 PROCEDURE — 80053 COMPREHEN METABOLIC PANEL: CPT

## 2025-04-13 PROCEDURE — 6370000000 HC RX 637 (ALT 250 FOR IP): Performed by: INTERNAL MEDICINE

## 2025-04-13 PROCEDURE — 84145 PROCALCITONIN (PCT): CPT

## 2025-04-13 PROCEDURE — 85652 RBC SED RATE AUTOMATED: CPT

## 2025-04-13 PROCEDURE — 6360000002 HC RX W HCPCS: Performed by: INTERNAL MEDICINE

## 2025-04-13 PROCEDURE — 36415 COLL VENOUS BLD VENIPUNCTURE: CPT

## 2025-04-13 PROCEDURE — 6370000000 HC RX 637 (ALT 250 FOR IP): Performed by: NURSE PRACTITIONER

## 2025-04-13 RX ORDER — PROPRANOLOL HCL 20 MG
40 TABLET ORAL 2 TIMES DAILY
Qty: 120 TABLET | Refills: 0 | Status: SHIPPED | OUTPATIENT
Start: 2025-04-13

## 2025-04-13 RX ORDER — SUCRALFATE 1 G/1
1 TABLET ORAL
Qty: 120 TABLET | Refills: 3 | Status: SHIPPED | OUTPATIENT
Start: 2025-04-13

## 2025-04-13 RX ORDER — CEFDINIR 300 MG/1
300 CAPSULE ORAL 2 TIMES DAILY
Qty: 14 CAPSULE | Refills: 0 | Status: SHIPPED | OUTPATIENT
Start: 2025-04-13 | End: 2025-04-20

## 2025-04-13 RX ORDER — GUAIFENESIN 600 MG/1
600 TABLET, EXTENDED RELEASE ORAL 2 TIMES DAILY
Qty: 14 TABLET | Refills: 0 | Status: SHIPPED | OUTPATIENT
Start: 2025-04-13 | End: 2025-04-20

## 2025-04-13 RX ORDER — ALOGLIPTIN 6.25 MG/1
6.25 TABLET, FILM COATED ORAL DAILY
Qty: 30 TABLET | Refills: 0 | Status: SHIPPED | OUTPATIENT
Start: 2025-04-14

## 2025-04-13 RX ORDER — AZITHROMYCIN 500 MG/1
500 TABLET, FILM COATED ORAL DAILY
Qty: 10 TABLET | Refills: 0 | Status: SHIPPED | OUTPATIENT
Start: 2025-04-14 | End: 2025-04-24

## 2025-04-13 RX ORDER — ERGOCALCIFEROL 1.25 MG/1
50000 CAPSULE ORAL WEEKLY
Qty: 5 CAPSULE | Refills: 0 | Status: SHIPPED | OUTPATIENT
Start: 2025-04-17

## 2025-04-13 RX ORDER — PANTOPRAZOLE SODIUM 40 MG/1
40 TABLET, DELAYED RELEASE ORAL
Qty: 30 TABLET | Refills: 3 | Status: SHIPPED | OUTPATIENT
Start: 2025-04-14

## 2025-04-13 RX ADMIN — METFORMIN HYDROCHLORIDE 1000 MG: 500 TABLET ORAL at 08:45

## 2025-04-13 RX ADMIN — ALOGLIPTIN 6.25 MG: 6.25 TABLET, FILM COATED ORAL at 08:45

## 2025-04-13 RX ADMIN — ENOXAPARIN SODIUM 40 MG: 100 INJECTION SUBCUTANEOUS at 08:45

## 2025-04-13 RX ADMIN — PANTOPRAZOLE SODIUM 40 MG: 40 TABLET, DELAYED RELEASE ORAL at 06:03

## 2025-04-13 RX ADMIN — ASPIRIN 81 MG: 81 TABLET, COATED ORAL at 08:45

## 2025-04-13 RX ADMIN — SUCRALFATE 1 G: 1 TABLET ORAL at 06:03

## 2025-04-13 RX ADMIN — INSULIN LISPRO 4 UNITS: 100 INJECTION, SOLUTION INTRAVENOUS; SUBCUTANEOUS at 11:35

## 2025-04-13 RX ADMIN — FERROUS SULFATE TAB EC 324 MG (65 MG FE EQUIVALENT) 324 MG: 324 (65 FE) TABLET DELAYED RESPONSE at 08:45

## 2025-04-13 RX ADMIN — CYCLOBENZAPRINE 10 MG: 10 TABLET, FILM COATED ORAL at 08:45

## 2025-04-13 RX ADMIN — PROPRANOLOL HYDROCHLORIDE 20 MG: 20 TABLET ORAL at 08:45

## 2025-04-13 RX ADMIN — HYDROCODONE BITARTRATE AND ACETAMINOPHEN 1 TABLET: 7.5; 325 TABLET ORAL at 06:03

## 2025-04-13 RX ADMIN — INSULIN LISPRO 4 UNITS: 100 INJECTION, SOLUTION INTRAVENOUS; SUBCUTANEOUS at 08:45

## 2025-04-13 RX ADMIN — ACETAMINOPHEN 650 MG: 325 TABLET, FILM COATED ORAL at 08:44

## 2025-04-13 RX ADMIN — DEXAMETHASONE SODIUM PHOSPHATE 6 MG: 10 INJECTION INTRAMUSCULAR; INTRAVENOUS at 08:45

## 2025-04-13 RX ADMIN — BARICITINIB 4 MG: 2 TABLET, FILM COATED ORAL at 08:45

## 2025-04-13 RX ADMIN — AZITHROMYCIN DIHYDRATE 500 MG: 250 TABLET ORAL at 08:45

## 2025-04-13 RX ADMIN — BUPROPION HYDROCHLORIDE 75 MG: 75 TABLET, FILM COATED ORAL at 08:45

## 2025-04-13 RX ADMIN — GUAIFENESIN 600 MG: 600 TABLET ORAL at 08:45

## 2025-04-13 ASSESSMENT — PAIN DESCRIPTION - LOCATION: LOCATION: BACK

## 2025-04-13 ASSESSMENT — PAIN SCALES - GENERAL
PAINLEVEL_OUTOF10: 9
PAINLEVEL_OUTOF10: 7

## 2025-04-13 ASSESSMENT — PAIN DESCRIPTION - ORIENTATION: ORIENTATION: LOWER
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
## 2025-04-13 NOTE — CARDIO/PULMONARY
6 MINUTE WALK TEST    Exercise type:  Hallway at patient's own pace.      O2 sat RA/O2LPM RR DAHLIA HR BP   Rest 96 R/A 18 0 107    1 min 91 R/A 18 0 111    2 min 95 R/A 20 0 92    3 min 94 R/A 20 0 112    4 min 95 R/A 20 0 118    5 min 97 R/A 20 0 92    6 min 97 R/A 20 0 125    Recovery 95 R/A 18 0 97      Distance walked: 627 feet    Breath sounds/patterns:      Comments:  No oxygen recommended at this time for home use.

## 2025-04-13 NOTE — DISCHARGE INSTRUCTIONS
Increased propranolol to 40mg BID. Will send home on PO abx - zpak and omnicef. any acute worsening go to ER.

## 2025-04-13 NOTE — DISCHARGE SUMMARY
Discharge Summary      Patient ID: Tato Thornton   :  1965     Patient's PCP: Kimmie Bradley PA-C    Admit Date: 2025     Discharge Date:   2025    Admitting Provider: Monica Acosta MD    Discharging Provider: GEOFFREY Way     Reason for this admission:   SOB    Discharge Diagnoses:     Active Hospital Problems    Diagnosis Date Noted    Thickening of esophagus [K22.89] 2025    Adenopathy, hilar [R59.0] 2025    Vitamin D deficiency [E55.9] 04/10/2025    History of asthma [Z87.09] 2025    Tachycardia [R00.0] 2025    Positive urine drug screen [R82.5] 2025    Acute respiratory failure due to COVID-19 [U07.1, J96.00] 2025    Anemia [D64.9] 06/15/2024    Chronic pain syndrome [G89.4] 2024    Pneumonia of both lungs due to infectious organism [J18.9] 2024    Type 2 diabetes mellitus [E11.9] 2016    Essential hypertension [I10] 2016       Procedures:  XR CHEST PORTABLE   Final Result   Improving bilateral pulmonary opacities      Electronically signed by Burton Pinto      CTA CHEST W CONTRAST   Final Result   Impression:   1. No evidence of acute pulmonary embolus.   2. Long segment distribution wall thickening esophagus most likely inflammatory or esophagitis given the length of distribution. Recommend correlation with EGD.   3. Mild mediastinal and hilar lymphadenopathy stable.   4. Bilateral consolidation and groundglass opacity lungs with slight centrilobular nodules and tree-in-bud opacity. This is favored to represent atypical infection or inflammatory etiology. Recommend clinical correlation.      Electronically signed by Balta Mendez MD      XR CHEST PORTABLE   Final Result      1.  Bilateral multifocal hazy airspace disease. Correlate for pneumonia.      Electronically signed by Marco Crews            Consults:   None      Briefly:   The patient is a 59 y.o. male with PMH of asthma, chronic pain (pain stimulator in

## 2025-04-13 NOTE — FLOWSHEET NOTE
04/13/25 0845   Assessment   Charting Type Shift assessment   Psychosocial   Psychosocial (WDL) WDL   Neurological   Neuro (WDL) WDL   [place] Coma Scale   Eye Opening 4   Best Verbal Response 5   Best Motor Response 6   [place] Coma Scale Score 15   HEENT (Head, Ears, Eyes, Nose, & Throat)   HEENT (WDL) X   Teeth Missing teeth   Respiratory   Respiratory Pattern Regular   Respiratory Depth Normal   Respiratory Quality/Effort Dyspnea with exertion   Chest Assessment Chest expansion symmetrical   L Breath Sounds Clear;Diminished   R Breath Sounds Clear;Diminished   Respiratory (WDL) X   Breath Sounds   Right Upper Lobe Clear;Diminished   Right Middle Lobe Clear;Diminished   Right Lower Lobe Clear;Diminished   Left Upper Lobe Clear;Diminished   Left Lower Lobe Clear;Diminished   Cardiac   Cardiac (WDL) X   Cardiac Monitor   Telemetry Box Number [id number]   Alarm Audible Yes   Gastrointestinal   Abdominal (WDL) WDL   Genitourinary   Genitourinary (WDL) WDL   Peripheral Vascular   Peripheral Vascular (WDL) WDL   Edema None   Skin Integumentary    Skin Color Pale   Skin Condition/Temp Warm;Dry   Skin Integumentary (WDL) X   Musculoskeletal   Musculoskeletal (WDL) X   Musculoskeletal Details   L Toes Surgery  (Tip of 2nd toe missing, healed)

## 2025-04-13 NOTE — PLAN OF CARE
Problem: Chronic Conditions and Co-morbidities  Goal: Patient's chronic conditions and co-morbidity symptoms are monitored and maintained or improved  4/12/2025 2103 by Nisha Skelton RN  Outcome: Progressing  4/12/2025 1004 by Libby Portillo RN  Outcome: Progressing  Flowsheets (Taken 4/12/2025 0800)  Care Plan - Patient's Chronic Conditions and Co-Morbidity Symptoms are Monitored and Maintained or Improved:   Monitor and assess patient's chronic conditions and comorbid symptoms for stability, deterioration, or improvement   Collaborate with multidisciplinary team to address chronic and comorbid conditions and prevent exacerbation or deterioration   Update acute care plan with appropriate goals if chronic or comorbid symptoms are exacerbated and prevent overall improvement and discharge     Problem: Discharge Planning  Goal: Discharge to home or other facility with appropriate resources  4/12/2025 2103 by Nisha Skelton RN  Outcome: Progressing  4/12/2025 1004 by Libby Portillo RN  Outcome: Progressing  Flowsheets (Taken 4/12/2025 0800)  Discharge to home or other facility with appropriate resources: Identify barriers to discharge with patient and caregiver     Problem: Pain  Goal: Verbalizes/displays adequate comfort level or baseline comfort level  4/12/2025 2103 by Nisha Skelton RN  Outcome: Progressing  4/12/2025 1004 by Libby Portillo RN  Outcome: Progressing     Problem: Safety - Adult  Goal: Free from fall injury  4/12/2025 2103 by Nisha Skelton RN  Outcome: Progressing  4/12/2025 1004 by Libby Portillo RN  Outcome: Progressing     Problem: ABCDS Injury Assessment  Goal: Absence of physical injury  4/12/2025 2103 by Nisha Skelton RN  Outcome: Progressing  4/12/2025 1004 by Libby Portillo RN  Outcome: Progressing     Problem: Respiratory - Adult  Goal: Achieves optimal ventilation and oxygenation  4/12/2025 2103 by Nisha Skelton RN  Outcome: Progressing  4/12/2025 1004

## 2025-04-14 NOTE — CARE COORDINATION
Spoke with patient, who said he is doing well. He said his medicines were explained prior to discharge and he has everything in needs to stay at his home. He has a follow-up appointment with Kimmie Bradley PA-C, on 4/17/2025 at 1 PM. He had no questions at the time.

## 2025-04-15 LAB
EKG ATRIAL RATE: 100 BPM
EKG DIAGNOSIS: NORMAL
EKG P AXIS: 56 DEGREES
EKG P-R INTERVAL: 192 MS
EKG Q-T INTERVAL: 340 MS
EKG QRS DURATION: 92 MS
EKG QTC CALCULATION (BAZETT): 438 MS
EKG R AXIS: 64 DEGREES
EKG T AXIS: 43 DEGREES
EKG VENTRICULAR RATE: 100 BPM

## 2025-04-21 RX ORDER — AZITHROMYCIN 500 MG/1
500 TABLET, FILM COATED ORAL DAILY
Qty: 10 TABLET | Refills: 0 | OUTPATIENT
Start: 2025-04-21 | End: 2025-05-01

## 2025-04-30 ENCOUNTER — OFFICE VISIT (OUTPATIENT)
Dept: PULMONOLOGY | Facility: CLINIC | Age: 60
End: 2025-04-30
Payer: MEDICARE

## 2025-04-30 VITALS
DIASTOLIC BLOOD PRESSURE: 82 MMHG | WEIGHT: 169.75 LBS | SYSTOLIC BLOOD PRESSURE: 113 MMHG | OXYGEN SATURATION: 97 % | BODY MASS INDEX: 25.14 KG/M2 | HEIGHT: 69 IN | HEART RATE: 97 BPM

## 2025-04-30 DIAGNOSIS — R93.89 ABNORMAL CHEST CT: Primary | ICD-10-CM

## 2025-04-30 DIAGNOSIS — J30.9 ALLERGIC RHINITIS, UNSPECIFIED SEASONALITY, UNSPECIFIED TRIGGER: ICD-10-CM

## 2025-04-30 DIAGNOSIS — J47.9 BRONCHIECTASIS WITHOUT ACUTE EXACERBATION: ICD-10-CM

## 2025-04-30 DIAGNOSIS — J45.40 MODERATE PERSISTENT ASTHMA WITHOUT COMPLICATION: ICD-10-CM

## 2025-04-30 DIAGNOSIS — R13.10 DYSPHAGIA, UNSPECIFIED TYPE: ICD-10-CM

## 2025-04-30 PROCEDURE — 99214 OFFICE O/P EST MOD 30 MIN: CPT | Performed by: NURSE PRACTITIONER

## 2025-04-30 RX ORDER — PLECANATIDE 3 MG/1
1 TABLET ORAL DAILY
COMMUNITY
Start: 2025-04-17

## 2025-04-30 RX ORDER — BLOOD GLUCOSE CONTROL HIGH,LOW
EACH MISCELLANEOUS
COMMUNITY
Start: 2025-04-08

## 2025-04-30 RX ORDER — BLOOD-GLUCOSE METER
EACH MISCELLANEOUS
COMMUNITY
Start: 2025-04-17

## 2025-04-30 RX ORDER — DOXYCYCLINE 100 MG/1
1 CAPSULE ORAL EVERY 12 HOURS SCHEDULED
COMMUNITY
Start: 2025-04-28

## 2025-04-30 RX ORDER — ERGOCALCIFEROL 1.25 MG/1
50000 CAPSULE ORAL
COMMUNITY
Start: 2025-04-17

## 2025-04-30 RX ORDER — MONTELUKAST SODIUM 10 MG/1
10 TABLET ORAL NIGHTLY
Qty: 30 TABLET | Refills: 5 | Status: SHIPPED | OUTPATIENT
Start: 2025-04-30

## 2025-04-30 NOTE — PROGRESS NOTES
"     Pulmonary Follow Up Office Visit - [facility]     Patient Name: Sumit Echeverria    Chief Complaint:    Chief Complaint   Patient presents with    Breathing Problem       History of Present Illness: Sumit Echeverria is a 59 y.o. male with longstanding history of asthma who is here today for follow up sooner than previously scheduled at the request of his PCP due to abnormal chest CT noted during hospitalization earlier this month at [facility] for management of acute hypoxia secondary to covid-19 and pneumonia. During admission he was started on baricitinib and supplemental oxygen as well as IV steroids, pulmonary toileting and nebulizers, Rocephin and azithromycin. He was also placed on Carafate and Protonix for findings of esophagitis. He clinically improved and was able to be d/c home on room air.    Notably, he has had chronically abnormal chest CT findings consistent with bronchiectasis, likely from chronic aspiration, with plan to only proceed with bronchoscopy if new/worsening findings are noted. He does have a history of chronic back pain and has intermittently used narcotics; he has a non-functioning pain pump implanted which he says was used previously for Morphine though he is wanting to have it removed as he did not find use beneficial. He reports that he spent most of last night vomiting which he says is due to his back pain and mowing grass yesterday without wearing a mask. He has ongoing issues with dysphagia as his \"esophagus is shrinking\". He denies any uncontrolled reflux symptoms currently. He has a pending GI evaluation arranged. He also has a pending appointment next month to be evaluated by [facility] pulmonology for his abnormal chest CT scan findings.    Since the time of his recent hospital d/c, he reports that his is feeling \"a lot better\". He says that his chronic cough is \"not real bad\" and intermittently produces phlegm. No wheezing. No fevers, no hemoptysis, no " unintended weight loss. No increased need for PANDA use.    Last Hospitalization: April 2025  Supplemental O2: No    Subjective      Review of Systems:  Review of Systems   Constitutional:  Negative for fever and unexpected weight change.   Respiratory:  Positive for cough and shortness of breath. Negative for wheezing.    Cardiovascular:  Negative for chest pain and leg swelling.   Musculoskeletal:  Positive for back pain (Chronic).        Past Medical History:   Past Medical History:   Diagnosis Date    Anemia     Anxiety     Asthma     Chronic pain syndrome     Deficiency of other specified B group vitamins     Depression     Diabetes mellitus     Hypercholesterolemia     Hypertension     Melanoma 2017    back    Migraine     OA (osteoarthritis)     Tachycardia     Testicular hypofunction        Past Surgical History:   Past Surgical History:   Procedure Laterality Date    AXILLARY LYMPH NODE BIOPSY/EXCISION      BRAIN SURGERY      PAIN PUMP INSERTION/REVISION N/A 2023    Dr. Lee at [place], [place]    SKIN CANCER EXCISION  2014    SKIN CANCER EXCISION  2022    TOE AMPUTATION Left 2022       Family History:   Family History   Problem Relation Age of Onset    Cancer Father     Hypertension Father     High cholesterol Father     Hypertension Brother        Social History:   Social History     Socioeconomic History    Marital status: Single   Tobacco Use    Smoking status: Never     Passive exposure: Past    Smokeless tobacco: Never   Vaping Use    Vaping status: Never Used   Substance and Sexual Activity    Alcohol use: Not Currently    Drug use: No    Sexual activity: Defer       Current Medications:     Current Outpatient Medications:     albuterol sulfate  (90 Base) MCG/ACT inhaler, Inhale 2 puffs Every 4 (Four) Hours As Needed for Wheezing or Shortness of Air., Disp: 8 g, Rfl: 5    aspirin 81 MG EC tablet, Take  by mouth., Disp: , Rfl:     Blood Glucose Calibration (Accu-Chek  Guide Control) liquid, USE WHEN CALIBRATING BLOOD GLUCOSE MONITOR, Disp: , Rfl:     Blood Glucose Monitoring Suppl (Accu-Chek Guide Me) w/Device kit, TEST EVERY DAY, Disp: , Rfl:     budesonide-formoterol (Breyna) 160-4.5 MCG/ACT inhaler, INHALE 2 PUFFS BY MOUTH TWICE DAILY. RINSE MOUTH AFTER USE, Disp: 10.2 g, Rfl: 2    buPROPion SR (WELLBUTRIN SR) 100 MG 12 hr tablet, Wellbutrin SR, Disp: , Rfl:     cyclobenzaprine (FLEXERIL) 10 MG tablet, , Disp: , Rfl: 3    doxycycline (MONODOX) 100 MG capsule, Take 1 capsule by mouth Every 12 (Twelve) Hours., Disp: , Rfl:     DULoxetine (CYMBALTA) 60 MG capsule, Take  by mouth. Take one capsule by mouth daily, Disp: , Rfl:     Emgality 120 MG/ML auto-injector pen, , Disp: , Rfl:     ferrous sulfate 324 (65 Fe) MG tablet delayed-release EC tablet, Take 1 tablet by mouth., Disp: , Rfl:     folic acid (FOLVITE) 1 MG tablet, Take 1 tablet by mouth Daily., Disp: , Rfl:     glucose blood test strip, , Disp: , Rfl:     glucose blood test strip, OneTouch Ultra Blue Test Strip, Disp: , Rfl:     meclizine (ANTIVERT) 25 MG tablet, TAKE 1 TABLET BY MOUTH TWICE DAILY AS NEEDED FOR VERTIGO, Disp: , Rfl:     metFORMIN (GLUCOPHAGE) 1000 MG tablet, Take  by mouth. Take one tablet by mouth twice daily, Disp: , Rfl:     propranolol (INDERAL) 20 MG tablet, take 1 tablet by mouth twice a day, Disp: , Rfl:     SUMAtriptan (IMITREX) 100 MG tablet, , Disp: , Rfl:     Trulance 3 MG tablet, Take 1 tablet by mouth Daily., Disp: , Rfl:     Vitamin D, Ergocalciferol, 67706 units capsule, Take 1 capsule by mouth., Disp: , Rfl:     rosuvastatin (CRESTOR) 20 MG tablet, Take 1 tablet by mouth Every Evening., Disp: , Rfl:     Rybelsus 7 MG tablet, Take 7 mg by mouth Daily. (Patient not taking: Reported on 4/30/2025), Disp: , Rfl:     Current Facility-Administered Medications:     bupivacaine (MARCAINE) injection 5 mL, 5 mL, Injection, Once, Yael Pires APRN    OnabotulinumtoxinA 155 Units, 155 Units,  "Intramuscular, Once, Yael Pires M, APRN     Allergies:   No Known Allergies    Objective     Physical Exam:  Vital Signs:   Vitals:    04/30/25 1132   BP: 113/82   Pulse: 97   SpO2: 97%   Weight: 77 kg (169 lb 12.1 oz)   Height: 174 cm (68.5\")     Body mass index is 25.44 kg/m².    Physical Exam  Vitals reviewed.   Constitutional:       General: He is not in acute distress.     Appearance: He is not toxic-appearing.   HENT:      Head: Normocephalic and atraumatic.      Mouth/Throat:      Mouth: Mucous membranes are moist.   Eyes:      Extraocular Movements: Extraocular movements intact.      Conjunctiva/sclera: Conjunctivae normal.   Cardiovascular:      Rate and Rhythm: Normal rate.      Heart sounds: Normal heart sounds.   Pulmonary:      Effort: Pulmonary effort is normal.      Breath sounds: Normal breath sounds.   Abdominal:      General: There is no distension.      Palpations: Abdomen is soft.   Musculoskeletal:         General: No swelling.      Cervical back: Neck supple.   Skin:     General: Skin is warm and dry.      Findings: No rash.   Neurological:      General: No focal deficit present.      Mental Status: He is alert and oriented to person, place, and time.   Psychiatric:         Mood and Affect: Mood normal.         Behavior: Behavior normal.       Results Review:   A1AT genotype MM.    WBC   Date Value Ref Range Status   04/13/2025 8.6 4.0 - 11.0 K/uL Final     RBC   Date Value Ref Range Status   04/13/2025 3.81 (L) 4.50 - 6.00 M/uL Final     Hemoglobin   Date Value Ref Range Status   04/13/2025 10.6 (L) 13.0 - 18.0 g/dL Final     Hematocrit   Date Value Ref Range Status   04/13/2025 32.1 (L) 40.0 - 54.0 % Final     MCV   Date Value Ref Range Status   04/13/2025 84.3 80.0 - 100.0 fL Final     MCH   Date Value Ref Range Status   04/13/2025 27.8 27.0 - 32.0 pg Final     MCHC   Date Value Ref Range Status   04/13/2025 33 31.0 - 35.0 g/dL Final     RDW   Date Value Ref Range Status   04/13/2025 12.9 " 11.0 - 16.0 % Final     MPV   Date Value Ref Range Status   04/13/2025 8.9 6.0 - 10.0 fL Final     Platelets   Date Value Ref Range Status   04/13/2025 327 150 - 400 K/uL Final     Neutrophil Rel %   Date Value Ref Range Status   04/13/2025 73.5 % Final     Lymphocyte Rel %   Date Value Ref Range Status   04/13/2025 13.8 % Final     Monocyte Rel %   Date Value Ref Range Status   04/13/2025 5.3 % Final     Eosinophil Rel %   Date Value Ref Range Status   04/07/2024 4.3 % Final     Eosinophil %   Date Value Ref Range Status   04/13/2025 0 % Final     Basophil Rel %   Date Value Ref Range Status   04/13/2025 0.3 % Final     Immature Grans %   Date Value Ref Range Status   04/13/2025 7.1 (H) 0.0 - 5.0 % Final     Neutrophils Absolute   Date Value Ref Range Status   04/13/2025 6.3 2.0 - 7.5 K/uL Final     Lymphocytes Absolute   Date Value Ref Range Status   04/13/2025 1.2 (L) 1.5 - 4.0 K/uL Final     Monocytes Absolute   Date Value Ref Range Status   04/13/2025 0.5 0.2 - 0.8 K/uL Final     Eosinophils Absolute   Date Value Ref Range Status   04/13/2025 0 0.0 - 0.4 K/uL Final     Basophils Absolute   Date Value Ref Range Status   04/13/2025 0 0.0 - 0.1 K/uL Final     Immature Grans, Absolute   Date Value Ref Range Status   04/13/2025 0.6 K/uL Final     Lab Results   Component Value Date    BUN 9 11/18/2024    CREATININE 1.1 11/18/2024       pH, Arterial   Date Value Ref Range Status   04/08/2025 7.382 7.350 - 7.450 Final     pCO2, Arterial   Date Value Ref Range Status   04/08/2025 38.3 35.0 - 45.0 mmHg Final     HCO3, Arterial   Date Value Ref Range Status   04/08/2025 22.2 22.0 - 26.0 mmol/L Final     O2 Saturation, Arterial   Date Value Ref Range Status   04/08/2025 87.7 (C) >92 % Final     FIO2   Date Value Ref Range Status   04/08/2025 0.21 Not Established % Final     April 2025 CTA chest showed no evidence of pulmonary embolus. Long segment distribution wall thickening of the esophagus most likely inflammatory or  esophagitis. Mild mediastinal and hilar lymphadenopathy is stable.  Bilateral consolidation and groundglass opacities with slight centrilobular nodules and tree-in-bud opacity, favored to be infectious/inflammatory in etiology.    November 2024 chest CT scan compared to September 2024 study showed 3ssentially stable pulmonary findings including bronchiectasis and bronchial wall thickening right middle lobe, multifocal groundglass nodules and tree-in-bud/miliary nodules. Differential considerations are unchanged, favoring infectious/inflammatory etiology. Aspiration remains a consideration. Stable mediastinal and hilar lymphadenopathy.     September 2024 PFTs showed normal pulmonary function.    May 2024 chest CT scan showed patchy groundglass opacities in the right lung likely reflecting pneumonitis. Consolidation in the right middle lobe-atelectasis versus pneumonia. Miliary pulmonary nodules in the right greater than left lungs. Favor infection. Enlarged mediastinal lymph nodes may be reactive. Small solitary pulmonary nodules.     May 2024 QuantiFERON and fungal antibodies negative.    Assessment / Plan      Assessment/Plan:   Diagnoses and all orders for this visit:    1. Abnormal chest CT (Primary)  As noted during previous clinic visit, he has chronically abnormal chest CT scan findings that appear consistent with bronchiectasis, likely from chronic aspiration given his clinical history involving accidental overdose as well as recurrent bouts of emesis which he relates to the severity of his chronic back pain. He denies any current illicit drug use and was again advised of the importance of using sedating medications cautiously. He is now s/p recent inpatient treatment of PNA again and is clinically improved since that time. He will plan to keep his upcoming appt w/ [facility] pulmonary for second opinion and consideration for bronchoscopy at that facility.     2. Bronchiectasis without acute exacerbation  No current  exacerbation symptoms.    3. Moderate persistent asthma without complication  Symptoms are currently at baseline. Continue current inhaled regimen. We discussed the risk and benefits of inhaled corticosteroids. Patient instructed to take them on a regular basis as prescribed. Patient instructed to rinse their mouth out after each use.     4. Allergic rhinitis, unspecified seasonality, unspecified trigger  -     montelukast (SINGULAIR) 10 MG tablet; Take 1 tablet by mouth Every Night.  Dispense: 30 tablet; Refill: 5  Begin use of Singulair. Discussed possible side effects of medications. Patient advised to stop taking the medicine if they experience any mood disturbances/SI. Risk and benefits of the medication were discussed with patient. Can add an OTC oral antihistamine +/- nasal steroid if needed.    5. Dysphagia, unspecified type  +dysphagia symptoms and findings of esophagitis noted during recent hospitalization. He was advised to follow through with work up by GI.       Follow Up:   November 2025 as previously scheduled, [facility] pul appt next month  The patient was counseled on diagnostic results, risks and benefits of treatment options, risk factor modifications and the importance of treatment compliance. The patient was advised to contact the clinic with concerns or worsening symptoms.     MARCELLE Wong   Pulmonary Medicine [place]     This document has been electronically signed by MARCELLE Wong  April 30, 2025

## 2025-05-08 ENCOUNTER — TRANSCRIBE ORDERS (OUTPATIENT)
Dept: ADMINISTRATIVE | Facility: HOSPITAL | Age: 60
End: 2025-05-08
Payer: MEDICARE

## 2025-05-08 DIAGNOSIS — M54.50 LOW BACK PAIN, UNSPECIFIED BACK PAIN LATERALITY, UNSPECIFIED CHRONICITY, UNSPECIFIED WHETHER SCIATICA PRESENT: Primary | ICD-10-CM

## 2025-05-08 DIAGNOSIS — M54.16 LUMBAR RADICULITIS: ICD-10-CM

## 2025-05-14 ENCOUNTER — OFFICE VISIT (OUTPATIENT)
Age: 60
End: 2025-05-14
Payer: MEDICARE

## 2025-05-14 VITALS
WEIGHT: 168.7 LBS | DIASTOLIC BLOOD PRESSURE: 84 MMHG | SYSTOLIC BLOOD PRESSURE: 122 MMHG | HEART RATE: 95 BPM | TEMPERATURE: 97.9 F | OXYGEN SATURATION: 97 % | HEIGHT: 69 IN | BODY MASS INDEX: 24.99 KG/M2

## 2025-05-14 DIAGNOSIS — J18.9 PNEUMONIA DUE TO INFECTIOUS ORGANISM, UNSPECIFIED LATERALITY, UNSPECIFIED PART OF LUNG: Primary | ICD-10-CM

## 2025-05-14 DIAGNOSIS — R91.8 ABNORMAL CT SCAN OF LUNG: ICD-10-CM

## 2025-05-14 DIAGNOSIS — J45.40 MODERATE PERSISTENT ASTHMA WITHOUT COMPLICATION: ICD-10-CM

## 2025-05-14 DIAGNOSIS — J96.00 ACUTE RESPIRATORY FAILURE DUE TO COVID-19: ICD-10-CM

## 2025-05-14 DIAGNOSIS — U07.1 ACUTE RESPIRATORY FAILURE DUE TO COVID-19: ICD-10-CM

## 2025-05-14 PROBLEM — J45.909 ASTHMA: Status: ACTIVE | Noted: 2021-01-13

## 2025-05-14 PROBLEM — G89.4 CHRONIC PAIN DISORDER: Status: ACTIVE | Noted: 2021-01-13

## 2025-05-14 RX ORDER — MELOXICAM 15 MG/1
15 TABLET ORAL DAILY
COMMUNITY

## 2025-05-14 RX ORDER — ALBUTEROL SULFATE 90 UG/1
2 INHALANT RESPIRATORY (INHALATION) EVERY 4 HOURS PRN
Qty: 8 G | Refills: 5 | Status: SHIPPED | OUTPATIENT
Start: 2025-05-14

## 2025-05-14 RX ORDER — PANTOPRAZOLE SODIUM 40 MG/1
40 TABLET, DELAYED RELEASE ORAL
COMMUNITY

## 2025-05-14 RX ORDER — OXYCODONE AND ACETAMINOPHEN 10; 325 MG/1; MG/1
TABLET ORAL
COMMUNITY
End: 2025-05-14

## 2025-05-14 RX ORDER — AZITHROMYCIN 500 MG/1
500 TABLET, FILM COATED ORAL DAILY
COMMUNITY
End: 2025-05-14

## 2025-05-14 RX ORDER — BUDESONIDE AND FORMOTEROL FUMARATE DIHYDRATE 160; 4.5 UG/1; UG/1
2 AEROSOL RESPIRATORY (INHALATION) 2 TIMES DAILY
Qty: 30.6 G | Refills: 3 | Status: SHIPPED | OUTPATIENT
Start: 2025-05-14

## 2025-05-14 RX ORDER — TRIAMCINOLONE ACETONIDE 1 MG/G
CREAM TOPICAL
COMMUNITY
Start: 2025-05-08 | End: 2025-05-14

## 2025-05-14 RX ORDER — ALOGLIPTIN 6.25 MG/1
1 TABLET, FILM COATED ORAL DAILY
COMMUNITY

## 2025-05-14 RX ORDER — HYDROCODONE BITARTRATE AND ACETAMINOPHEN 7.5; 325 MG/1; MG/1
1 TABLET ORAL 2 TIMES DAILY PRN
COMMUNITY
End: 2025-05-14

## 2025-05-14 RX ORDER — SUCRALFATE 1 G/1
TABLET ORAL
COMMUNITY

## 2025-05-14 RX ORDER — GUAIFENESIN 600 MG/1
600 TABLET, EXTENDED RELEASE ORAL 2 TIMES DAILY
COMMUNITY
End: 2025-05-14

## 2025-05-14 NOTE — PROGRESS NOTES
[facility] Pulmonary Initial Evaluation    CHIEF COMPLAINT    Abnormal CT scan    Referred by:  Dr. Oscar    HISTORY OF PRESENT ILLNESS    Sumit Echeverria is a 59 y.o.male here today for an initial evaluation of his abnormal CT scan.  He states that on April 8 he was hospitalized at [facility] for COVID pneumonia.  He required high flow nasal cannula and treated with antibiotics and IV steroids.  His symptoms improved.  He had a CT of the chest that showed some abnormal findings and possible esophagitis and he was started on Carafate and Protonix.  He was also found to be anemic and started on iron supplements while hospitalized.  His symptoms continue to improve.  He was able to wean off the oxygen to room air.  He was discharged home and recommended further follow-up with his PCP.    He has been seen by [facility] pulmonology and was seen at the end of April.  He was advised that no other interventions were needed for his CT scan findings and he was told it was possible aspiration.  He had also been started on Symbicort per the recommendations back in 2024.    He wanted to establish care in our office today.    He has finished his antibiotics and does feel like he is slowly returning back to his baseline.  He does have some mild shortness of breath with exertion but does recover very quickly at rest.    He did have asthma as a child and was on inhalers as needed.  He denies any recurrent infections.  He denies any exposure to TB.  He denies any chemical environmental exposures in the past.  He denies any history of lung cancers in first-degree relatives.  He is a lifetime non-smoker.  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    He does have seasonal allergies and will take medication over-the-counter as needed.    He does occasionally have reflux symptoms he does take Tums about once a month.  He denies any swallowing difficulties.  He has been on Protonix since  discharge.    He denies any chest pain or palpitations.  Denies any lower extreme edema or calf tenderness.    He is using the Breyna 2 puffs daily.  He does not use a rescue inhaler.  He has minimal shortness of breath with exertion.    He does not sleep well due to his back pain.  He has a pain pump that is not working currently.  He does snore and does not feel well rested due to his chronic back pain.  He is planning on getting the pain pump removed.    Patient Active Problem List   Diagnosis    Chronic migraine without aura, intractable, with status migrainosus    Abnormal CT scan of lung       No Known Allergies    Current Outpatient Medications:     albuterol sulfate  (90 Base) MCG/ACT inhaler, Inhale 2 puffs Every 4 (Four) Hours As Needed for Wheezing or Shortness of Air., Disp: 8 g, Rfl: 5    Alogliptin Benzoate 6.25 MG tablet, Take 1 tablet by mouth Daily., Disp: , Rfl:     aspirin 81 MG EC tablet, Take  by mouth., Disp: , Rfl:     Blood Glucose Calibration (Accu-Chek Guide Control) liquid, USE WHEN CALIBRATING BLOOD GLUCOSE MONITOR, Disp: , Rfl:     Blood Glucose Monitoring Suppl (Accu-Chek Guide Me) w/Device kit, TEST EVERY DAY, Disp: , Rfl:     budesonide-formoterol (Breyna) 160-4.5 MCG/ACT inhaler, Inhale 2 puffs 2 (Two) Times a Day. Rinse mouth after each use, Disp: 30.6 g, Rfl: 3    buPROPion SR (WELLBUTRIN SR) 100 MG 12 hr tablet, Wellbutrin SR, Disp: , Rfl:     cyclobenzaprine (FLEXERIL) 10 MG tablet, , Disp: , Rfl: 3    DULoxetine (CYMBALTA) 60 MG capsule, Take  by mouth. Take one capsule by mouth daily, Disp: , Rfl:     Emgality 120 MG/ML auto-injector pen, , Disp: , Rfl:     ferrous sulfate 324 (65 Fe) MG tablet delayed-release EC tablet, Take 1 tablet by mouth., Disp: , Rfl:     folic acid (FOLVITE) 1 MG tablet, Take 1 tablet by mouth Daily., Disp: , Rfl:     glucose blood test strip, , Disp: , Rfl:     glucose blood test strip, OneTouch Ultra Blue Test Strip, Disp: , Rfl:     meclizine  (ANTIVERT) 25 MG tablet, TAKE 1 TABLET BY MOUTH TWICE DAILY AS NEEDED FOR VERTIGO, Disp: , Rfl:     meloxicam (MOBIC) 15 MG tablet, Take 1 tablet by mouth Daily., Disp: , Rfl:     metFORMIN (GLUCOPHAGE) 1000 MG tablet, Take  by mouth. Take one tablet by mouth twice daily, Disp: , Rfl:     montelukast (SINGULAIR) 10 MG tablet, Take 1 tablet by mouth Every Night., Disp: 30 tablet, Rfl: 5    pantoprazole (PROTONIX) 40 MG EC tablet, Take 1 tablet by mouth Every Morning Before Breakfast., Disp: , Rfl:     propranolol (INDERAL) 20 MG tablet, take 1 tablet by mouth twice a day, Disp: , Rfl:     rosuvastatin (CRESTOR) 20 MG tablet, Take 1 tablet by mouth Every Evening., Disp: , Rfl:     Rybelsus 7 MG tablet, Take 7 mg by mouth Daily., Disp: , Rfl:     sucralfate (CARAFATE) 1 g tablet, TAKE 1 TABLET BY MOUTH FOUR TIMES DAILY BEFORE MEALS AND AT NIGHT, Disp: , Rfl:     SUMAtriptan (IMITREX) 100 MG tablet, , Disp: , Rfl:     Trulance 3 MG tablet, Take 1 tablet by mouth Daily., Disp: , Rfl:     Vitamin D, Ergocalciferol, 39935 units capsule, Take 1 capsule by mouth., Disp: , Rfl:     Current Facility-Administered Medications:     bupivacaine (MARCAINE) injection 5 mL, 5 mL, Injection, Once, Yael Pires APRN    OnabotulinumtoxinA 155 Units, 155 Units, Intramuscular, Once, Yael Pires APRN  MEDICATION LIST AND ALLERGIES REVIEWED.    Social History     Tobacco Use    Smoking status: Never     Passive exposure: Past    Smokeless tobacco: Never   Vaping Use    Vaping status: Never Used   Substance Use Topics    Alcohol use: Not Currently    Drug use: No       FAMILY AND SOCIAL HISTORY REVIEWED.    Review of Systems   Constitutional:  Negative for activity change, appetite change, fatigue, fever and unexpected weight change.   HENT:  Negative for congestion, postnasal drip, rhinorrhea, sinus pressure, sore throat and voice change.    Eyes:  Negative for visual disturbance.   Respiratory:  Positive for shortness of breath.  "Negative for cough, chest tightness and wheezing.    Cardiovascular:  Negative for chest pain, palpitations and leg swelling.   Gastrointestinal:  Negative for abdominal distention, abdominal pain, nausea and vomiting.   Endocrine: Negative for cold intolerance and heat intolerance.   Genitourinary:  Negative for difficulty urinating and urgency.   Musculoskeletal:  Negative for arthralgias, back pain and neck pain.   Skin:  Negative for color change and pallor.   Allergic/Immunologic: Negative for environmental allergies and food allergies.   Neurological:  Negative for dizziness, syncope, weakness and light-headedness.   Hematological:  Negative for adenopathy. Does not bruise/bleed easily.   Psychiatric/Behavioral:  Negative for agitation and behavioral problems.    .    /84   Pulse 95   Temp 97.9 °F (36.6 °C)   Ht 174 cm (68.5\")   Wt 76.5 kg (168 lb 11.2 oz)   SpO2 97% Comment: Room air at rest  BMI 25.28 kg/m²       There is no immunization history on file for this patient.    Physical Exam  Vitals and nursing note reviewed.   Constitutional:       Appearance: He is well-developed. He is not diaphoretic.   HENT:      Head: Normocephalic and atraumatic.   Eyes:      Pupils: Pupils are equal, round, and reactive to light.   Neck:      Thyroid: No thyromegaly.   Cardiovascular:      Rate and Rhythm: Normal rate and regular rhythm.      Heart sounds: Normal heart sounds. No murmur heard.     No friction rub. No gallop.   Pulmonary:      Effort: Pulmonary effort is normal. No respiratory distress.      Breath sounds: Normal breath sounds. No wheezing or rales.   Chest:      Chest wall: No tenderness.   Abdominal:      General: Bowel sounds are normal.      Palpations: Abdomen is soft.      Tenderness: There is no abdominal tenderness.   Musculoskeletal:         General: No swelling. Normal range of motion.      Cervical back: Normal range of motion and neck supple.   Lymphadenopathy:      Cervical: No " cervical adenopathy.   Skin:     General: Skin is warm and dry.      Capillary Refill: Capillary refill takes less than 2 seconds.   Neurological:      Mental Status: He is alert and oriented to person, place, and time.   Psychiatric:         Mood and Affect: Mood normal.         Behavior: Behavior normal.           RESULTS    Spirometry Interpretation: FVC 4.30 100% predicted, FEV1 3.60 108% predicted, FEV1/FVC 84% predicted, TLC 5.55 82% predicted, DLCO 67% predicted, no obstruction, no restriction and reduced DLCO.    XR Chest 1 View  Result Date: 4/11/2025  Improving bilateral pulmonary opacities Electronically signed by Evelio Ojeda    CT Angiogram Chest  Result Date: 4/8/2025  Impression: 1. No evidence of acute pulmonary embolus. 2. Long segment distribution wall thickening esophagus most likely inflammatory or esophagitis given the length of distribution. Recommend correlation with EGD. 3. Mild mediastinal and hilar lymphadenopathy stable. 4. Bilateral consolidation and groundglass opacity lungs with slight centrilobular nodules and tree-in-bud opacity. This is favored to represent atypical infection or inflammatory etiology. Recommend clinical correlation. Electronically signed by Buck López MD    XR Chest 1 View  Result Date: 4/8/2025  1.  Bilateral multifocal hazy airspace disease. Correlate for pneumonia. Electronically signed by Pablo Foster    PROBLEM LIST    Problem List Items Addressed This Visit          Pulmonary and Pneumonias    Abnormal CT scan of lung     Other Visit Diagnoses         Pneumonia due to infectious organism, unspecified laterality, unspecified part of lung    -  Primary    Relevant Medications    budesonide-formoterol (Breyna) 160-4.5 MCG/ACT inhaler    albuterol sulfate  (90 Base) MCG/ACT inhaler    Other Relevant Orders    CT Chest Without Contrast      Moderate persistent asthma without complication        Relevant Medications    budesonide-formoterol (Breyna)  160-4.5 MCG/ACT inhaler    albuterol sulfate  (90 Base) MCG/ACT inhaler    Other Relevant Orders    Spirometry with Diffusion Capacity & Lung Volumes (Completed)              DISCUSSION    Mr. Echeverria was here for initial valuation of his abnormal CT scan of the chest.  He does feel like he is returning back to his baseline.  I do suspect the changes are due to the COVID-pneumonia.  We will repeat a CT scan of the chest in the next few weeks.  He can have this performed [facility] or [facility] whichever 1 is better for him.    We reviewed his PFTs in the office today and he has no obstruction or restriction.  He can continue using the Breyna daily.  I did encourage him use albuterol if needed.    I did advise him if his CT scan does not show any improvement we will proceed with some lab work to rule out any causes of the abnormal findings.  He seems to be returning back to baseline and has no symptoms currently.    At the very least I would like to see him on a yearly basis.  We will have performed PFTs and a chest x-ray at this follow-up.    I personally spent a total of 36 minutes on patient visit today including chart review, face to face with the patient obtaining the history and physical exam, review of pertinent images and tests, counseling and discussion and/or coordination of care as described above, and documentation.  Total time excludes time spent on other separate services such as performing procedures or test interpretation, if applicable.        Patti Ch, APRN  05/14/202515:26 EDT  Electronically signed     Please note that portions of this note were completed with a voice recognition program.        CC: Leyda Newman PA-C

## 2025-05-23 ENCOUNTER — HOSPITAL ENCOUNTER (OUTPATIENT)
Dept: MRI IMAGING | Facility: HOSPITAL | Age: 60
Discharge: HOME | End: 2025-05-23

## 2025-05-23 DIAGNOSIS — M54.50 LOW BACK PAIN ASSOCIATED WITH A SPINAL DISORDER OTHER THAN RADICULOPATHY OR SPINAL STENOSIS: ICD-10-CM

## 2025-06-09 RX ORDER — ALOGLIPTIN 6.25 MG/1
6.25 TABLET, FILM COATED ORAL DAILY
Qty: 30 TABLET | Refills: 0 | OUTPATIENT
Start: 2025-06-09

## 2025-06-09 RX ORDER — SUCRALFATE 1 G/1
TABLET ORAL
Qty: 120 TABLET | Refills: 3 | OUTPATIENT
Start: 2025-06-09

## 2025-06-16 ENCOUNTER — HOSPITAL ENCOUNTER (OUTPATIENT)
Dept: CT IMAGING | Facility: HOSPITAL | Age: 60
Discharge: HOME | End: 2025-06-16
Payer: MEDICARE

## 2025-06-16 DIAGNOSIS — J18.9 LUNG INFECTION: ICD-10-CM

## 2025-06-16 PROCEDURE — 71250 CT THORAX DX C-: CPT

## 2025-06-17 DIAGNOSIS — J18.9 PNEUMONIA DUE TO INFECTIOUS ORGANISM, UNSPECIFIED LATERALITY, UNSPECIFIED PART OF LUNG: ICD-10-CM

## 2025-06-18 RX ORDER — PROPRANOLOL HCL 20 MG
40 TABLET ORAL 2 TIMES DAILY
Qty: 120 TABLET | Refills: 0 | OUTPATIENT
Start: 2025-06-18

## 2025-06-23 DIAGNOSIS — R91.8 ABNORMAL CT SCAN OF LUNG: Primary | ICD-10-CM

## 2025-06-23 DIAGNOSIS — J30.9 ALLERGIC RHINITIS, UNSPECIFIED SEASONALITY, UNSPECIFIED TRIGGER: ICD-10-CM

## 2025-06-23 NOTE — PROGRESS NOTES
I talked with Mr. Echeverria and he had a CT scan performed last week.  I also reviewed the images with Dr. Samuel and we will do some more blood work to rule out inflammation or infectious causes.  He is also going to produce a sputum sample for me if possible.  The lab work will need to be sent to [facility] and the sputum cultures as well.    We will schedule a follow-up CT scan to be performed in about 3 to 4 weeks.  I will call him with report.    Once we get a CT scan report if his CT scan continues to be abnormal he will need a bronchoscopy.

## 2025-06-25 ENCOUNTER — HOSPITAL ENCOUNTER (OUTPATIENT)
Facility: HOSPITAL | Age: 60
Discharge: HOME | End: 2025-06-25
Payer: MEDICARE

## 2025-06-25 LAB
BASOPHILS # BLD: 0.1 K/UL (ref 0–0.1)
BASOPHILS NFR BLD: 1.3 %
CRP SERPL-MCNC: <3 MG/L (ref 0–5.1)
EOSINOPHIL # BLD: 0.1 K/UL (ref 0–0.4)
EOSINOPHIL NFR BLD: 1.7 %
ERYTHROCYTE [DISTWIDTH] IN BLOOD BY AUTOMATED COUNT: 14.3 % (ref 11–16)
HCT VFR BLD AUTO: 35.1 % (ref 40–54)
HGB BLD-MCNC: 10.8 G/DL (ref 13–18)
IMM GRANULOCYTES # BLD: 0 K/UL
IMM GRANULOCYTES NFR BLD: 0.2 % (ref 0–5)
LYMPHOCYTES # BLD: 1.3 K/UL (ref 1.5–4)
LYMPHOCYTES NFR BLD: 24.7 %
MCH RBC QN AUTO: 25.5 PG (ref 27–32)
MCHC RBC AUTO-ENTMCNC: 30.8 G/DL (ref 31–35)
MCV RBC AUTO: 83 FL (ref 80–100)
MONOCYTES # BLD: 0.4 K/UL (ref 0.2–0.8)
MONOCYTES NFR BLD: 7.8 %
NEUTROPHILS # BLD: 3.4 K/UL (ref 2–7.5)
NEUTS SEG NFR BLD: 64.3 %
PLATELET # BLD AUTO: 336 K/UL (ref 150–400)
PMV BLD AUTO: 9.1 FL (ref 6–10)
RBC # BLD AUTO: 4.23 M/UL (ref 4.5–6)
RHEUMATOID FACT SER IA-ACNC: <10 IU/ML
WBC # BLD AUTO: 5.2 K/UL (ref 4–11)

## 2025-06-25 PROCEDURE — 86331 IMMUNODIFFUSION OUCHTERLONY: CPT

## 2025-06-25 PROCEDURE — 86431 RHEUMATOID FACTOR QUANT: CPT

## 2025-06-25 PROCEDURE — 83516 IMMUNOASSAY NONANTIBODY: CPT

## 2025-06-25 PROCEDURE — 36415 COLL VENOUS BLD VENIPUNCTURE: CPT

## 2025-06-25 PROCEDURE — 82785 ASSAY OF IGE: CPT

## 2025-06-25 PROCEDURE — 86635 COCCIDIOIDES ANTIBODY: CPT

## 2025-06-25 PROCEDURE — 86698 HISTOPLASMA ANTIBODY: CPT

## 2025-06-25 PROCEDURE — 86606 ASPERGILLUS ANTIBODY: CPT

## 2025-06-25 PROCEDURE — 86038 ANTINUCLEAR ANTIBODIES: CPT

## 2025-06-25 PROCEDURE — 86140 C-REACTIVE PROTEIN: CPT

## 2025-06-25 PROCEDURE — 86003 ALLG SPEC IGE CRUDE XTRC EA: CPT

## 2025-06-25 PROCEDURE — 85025 COMPLETE CBC W/AUTO DIFF WBC: CPT

## 2025-06-25 PROCEDURE — 86612 BLASTOMYCES ANTIBODY: CPT

## 2025-06-26 LAB — ANA SER QL IA: NEGATIVE

## 2025-06-27 LAB
DEPRECATED MISC ALLERGEN IGE RAST QL: NORMAL
IGE SERPL-ACNC: 122 KU/L
MISCELLANEOUS LAB TEST RESULT: ABNORMAL

## 2025-06-28 LAB
MYELOPEROXIDASE AB SER-ACNC: 1 AU/ML (ref 0–19)
PROTEINASE3 AB SER-ACNC: 12 AU/ML (ref 0–19)

## 2025-06-30 LAB
ASPERGILLUS AB SER QL ID: NOT DETECTED
B DERMAT AB SER QL ID: NOT DETECTED
COCCIDIOIDES AB SPEC QL ID: NOT DETECTED
H CAPSUL AB TITR SER ID: NOT DETECTED {TITER}
MISCELLANEOUS LAB TEST ORDER: NORMAL
MISCELLANEOUS LAB TEST RESULT: NORMAL
MISCELLANEOUS LAB TEST RESULT: NORMAL

## 2025-08-18 ENCOUNTER — OFFICE VISIT (OUTPATIENT)
Dept: GASTROENTEROLOGY | Facility: CLINIC | Age: 60
End: 2025-08-18
Payer: MEDICARE

## 2025-08-18 VITALS
WEIGHT: 169 LBS | BODY MASS INDEX: 25.32 KG/M2 | DIASTOLIC BLOOD PRESSURE: 82 MMHG | HEART RATE: 75 BPM | OXYGEN SATURATION: 99 % | SYSTOLIC BLOOD PRESSURE: 120 MMHG

## 2025-08-18 DIAGNOSIS — R93.3 ABNORMAL CT SCAN, ESOPHAGUS: Chronic | ICD-10-CM

## 2025-08-18 DIAGNOSIS — Z12.11 ENCOUNTER FOR SCREENING FOR MALIGNANT NEOPLASM OF COLON: Primary | ICD-10-CM

## 2025-08-18 DIAGNOSIS — K59.00 CONSTIPATION, UNSPECIFIED CONSTIPATION TYPE: Chronic | ICD-10-CM

## 2025-08-18 DIAGNOSIS — K21.9 GASTROESOPHAGEAL REFLUX DISEASE, UNSPECIFIED WHETHER ESOPHAGITIS PRESENT: Chronic | ICD-10-CM

## 2025-08-18 DIAGNOSIS — D50.9 IRON DEFICIENCY ANEMIA, UNSPECIFIED IRON DEFICIENCY ANEMIA TYPE: Chronic | ICD-10-CM

## 2025-08-18 DIAGNOSIS — R13.19 ESOPHAGEAL DYSPHAGIA: Chronic | ICD-10-CM

## 2025-08-18 RX ORDER — SODIUM, POTASSIUM,MAG SULFATES 17.5-3.13G
SOLUTION, RECONSTITUTED, ORAL ORAL
Qty: 177 ML | Refills: 0 | Status: SHIPPED | OUTPATIENT
Start: 2025-08-18

## 2025-08-18 RX ORDER — SODIUM CHLORIDE 9 MG/ML
70 INJECTION, SOLUTION INTRAVENOUS CONTINUOUS PRN
OUTPATIENT
Start: 2025-08-18 | End: 2025-08-19

## 2025-08-18 RX ORDER — BISACODYL 5 MG/1
TABLET, DELAYED RELEASE ORAL
Qty: 4 TABLET | Refills: 0 | Status: SHIPPED | OUTPATIENT
Start: 2025-08-18

## 2025-08-22 ENCOUNTER — PATIENT ROUNDING (BHMG ONLY) (OUTPATIENT)
Dept: GASTROENTEROLOGY | Facility: CLINIC | Age: 60
End: 2025-08-22
Payer: MEDICARE

## 2025-08-24 ENCOUNTER — HOSPITAL ENCOUNTER (EMERGENCY)
Facility: HOSPITAL | Age: 60
Discharge: LEFT BEFORE DISCHARGE | End: 2025-08-24

## 2025-08-28 ENCOUNTER — OFFICE VISIT (OUTPATIENT)
Age: 60
End: 2025-08-28
Payer: MEDICARE

## 2025-08-28 VITALS
BODY MASS INDEX: 25.2 KG/M2 | SYSTOLIC BLOOD PRESSURE: 132 MMHG | TEMPERATURE: 97.5 F | HEIGHT: 69 IN | OXYGEN SATURATION: 97 % | HEART RATE: 63 BPM | DIASTOLIC BLOOD PRESSURE: 90 MMHG | RESPIRATION RATE: 16 BRPM | WEIGHT: 170.13 LBS

## 2025-08-28 DIAGNOSIS — J45.909 IDIOPATHIC ASTHMA: ICD-10-CM

## 2025-08-28 DIAGNOSIS — J96.00 ACUTE RESPIRATORY FAILURE DUE TO COVID-19: ICD-10-CM

## 2025-08-28 DIAGNOSIS — R13.19 ESOPHAGEAL DYSPHAGIA: Chronic | ICD-10-CM

## 2025-08-28 DIAGNOSIS — R91.8 ABNORMAL CT SCAN OF LUNG: Primary | ICD-10-CM

## 2025-08-28 DIAGNOSIS — U07.1 ACUTE RESPIRATORY FAILURE DUE TO COVID-19: ICD-10-CM

## 2025-08-28 DIAGNOSIS — R93.3 ABNORMAL CT SCAN, ESOPHAGUS: ICD-10-CM

## 2025-08-28 RX ORDER — BUPRENORPHINE HYDROCHLORIDE AND NALOXONE HYDROCHLORIDE DIHYDRATE 8; 2 MG/1; MG/1
1 TABLET SUBLINGUAL DAILY
COMMUNITY
Start: 2025-08-27

## 2025-08-31 ENCOUNTER — APPOINTMENT (OUTPATIENT)
Dept: CT IMAGING | Facility: HOSPITAL | Age: 60
End: 2025-08-31
Payer: MEDICARE

## 2025-08-31 ENCOUNTER — HOSPITAL ENCOUNTER (EMERGENCY)
Facility: HOSPITAL | Age: 60
Discharge: HOME | End: 2025-08-31
Attending: HOSPITALIST
Payer: MEDICARE

## 2025-08-31 ENCOUNTER — APPOINTMENT (OUTPATIENT)
Dept: GENERAL RADIOLOGY | Facility: HOSPITAL | Age: 60
End: 2025-08-31
Payer: MEDICARE

## 2025-08-31 VITALS
TEMPERATURE: 98.2 F | HEIGHT: 68 IN | OXYGEN SATURATION: 96 % | RESPIRATION RATE: 19 BRPM | DIASTOLIC BLOOD PRESSURE: 75 MMHG | WEIGHT: 170 LBS | HEART RATE: 107 BPM | BODY MASS INDEX: 25.76 KG/M2 | SYSTOLIC BLOOD PRESSURE: 122 MMHG

## 2025-08-31 DIAGNOSIS — R51.9 NONINTRACTABLE HEADACHE, UNSPECIFIED CHRONICITY PATTERN, UNSPECIFIED HEADACHE TYPE: ICD-10-CM

## 2025-08-31 DIAGNOSIS — J18.9 PNEUMONIA OF LEFT LUNG DUE TO INFECTIOUS ORGANISM, UNSPECIFIED PART OF LUNG: Primary | ICD-10-CM

## 2025-08-31 LAB
ALBUMIN SERPL-MCNC: 4.3 G/DL (ref 3.4–4.8)
ALBUMIN/GLOB SERPL: 1.3 {RATIO} (ref 0.8–2)
ALP SERPL-CCNC: 41 U/L (ref 25–100)
ALT SERPL-CCNC: 9 U/L (ref 4–36)
ANION GAP SERPL CALCULATED.3IONS-SCNC: 15 MMOL/L (ref 3–16)
AST SERPL-CCNC: 14 U/L (ref 8–33)
BASOPHILS # BLD: 0 K/UL (ref 0–0.1)
BASOPHILS NFR BLD: 0.5 %
BILIRUB SERPL-MCNC: 0.5 MG/DL (ref 0.3–1.2)
BUN SERPL-MCNC: 10 MG/DL (ref 6–20)
CALCIUM SERPL-MCNC: 9.4 MG/DL (ref 8.3–10.6)
CHLORIDE SERPL-SCNC: 99 MMOL/L (ref 98–107)
CK SERPL-CCNC: 81 U/L (ref 39–308)
CO2 SERPL-SCNC: 23 MMOL/L (ref 20–30)
CREAT SERPL-MCNC: 1.4 MG/DL (ref 0.8–1.3)
EOSINOPHIL # BLD: 0.1 K/UL (ref 0–0.4)
EOSINOPHIL NFR BLD: 0.6 %
ERYTHROCYTE [DISTWIDTH] IN BLOOD BY AUTOMATED COUNT: 14.3 % (ref 11–16)
FLUAV AG NPH QL: NEGATIVE
FLUBV AG NPH QL: NEGATIVE
GFR SERPLBLD CREATININE-BSD FMLA CKD-EPI: 57 ML/MIN/{1.73_M2}
GLOBULIN SER CALC-MCNC: 3.2 G/DL
GLUCOSE SERPL-MCNC: 163 MG/DL (ref 74–106)
HCT VFR BLD AUTO: 35 % (ref 40–54)
HGB BLD-MCNC: 11.1 G/DL (ref 13–18)
IMM GRANULOCYTES # BLD: 0 K/UL
IMM GRANULOCYTES NFR BLD: 0.4 % (ref 0–5)
LYMPHOCYTES # BLD: 0.5 K/UL (ref 1.5–4)
LYMPHOCYTES NFR BLD: 6.4 %
MCH RBC QN AUTO: 25.8 PG (ref 27–32)
MCHC RBC AUTO-ENTMCNC: 31.7 G/DL (ref 31–35)
MCV RBC AUTO: 81.2 FL (ref 80–100)
MONOCYTES # BLD: 0.6 K/UL (ref 0.2–0.8)
MONOCYTES NFR BLD: 7.8 %
NEUTROPHILS # BLD: 6.8 K/UL (ref 2–7.5)
NEUTS SEG NFR BLD: 84.3 %
NT-PROBNP SERPL-MCNC: 184 PG/ML (ref 0–1800)
PLATELET # BLD AUTO: 201 K/UL (ref 150–400)
PMV BLD AUTO: 9 FL (ref 6–10)
POTASSIUM SERPL-SCNC: 3.9 MMOL/L (ref 3.4–5.1)
PROT SERPL-MCNC: 7.5 G/DL (ref 6.4–8.3)
RBC # BLD AUTO: 4.31 M/UL (ref 4.5–6)
SARS-COV-2 RDRP RESP QL NAA+PROBE: NOT DETECTED
SODIUM SERPL-SCNC: 137 MMOL/L (ref 136–145)
TROPONIN, HIGH SENSITIVITY: <6 NG/L (ref 0–22)
WBC # BLD AUTO: 8 K/UL (ref 4–11)

## 2025-08-31 PROCEDURE — 2580000003 HC RX 258: Performed by: HOSPITALIST

## 2025-08-31 PROCEDURE — 83880 ASSAY OF NATRIURETIC PEPTIDE: CPT

## 2025-08-31 PROCEDURE — 87804 INFLUENZA ASSAY W/OPTIC: CPT

## 2025-08-31 PROCEDURE — 99284 EMERGENCY DEPT VISIT MOD MDM: CPT

## 2025-08-31 PROCEDURE — 84484 ASSAY OF TROPONIN QUANT: CPT

## 2025-08-31 PROCEDURE — 71045 X-RAY EXAM CHEST 1 VIEW: CPT

## 2025-08-31 PROCEDURE — 70450 CT HEAD/BRAIN W/O DYE: CPT

## 2025-08-31 PROCEDURE — 82550 ASSAY OF CK (CPK): CPT

## 2025-08-31 PROCEDURE — 80053 COMPREHEN METABOLIC PANEL: CPT

## 2025-08-31 PROCEDURE — 85025 COMPLETE CBC W/AUTO DIFF WBC: CPT

## 2025-08-31 PROCEDURE — 96375 TX/PRO/DX INJ NEW DRUG ADDON: CPT

## 2025-08-31 PROCEDURE — 96365 THER/PROPH/DIAG IV INF INIT: CPT

## 2025-08-31 PROCEDURE — 36415 COLL VENOUS BLD VENIPUNCTURE: CPT

## 2025-08-31 PROCEDURE — 87635 SARS-COV-2 COVID-19 AMP PRB: CPT

## 2025-08-31 PROCEDURE — 6360000002 HC RX W HCPCS: Performed by: HOSPITALIST

## 2025-08-31 RX ORDER — ALBUTEROL SULFATE 90 UG/1
2 INHALANT RESPIRATORY (INHALATION) EVERY 4 HOURS PRN
Qty: 18 G | Refills: 0 | Status: SHIPPED | OUTPATIENT
Start: 2025-08-31

## 2025-08-31 RX ORDER — DOXYCYCLINE HYCLATE 100 MG
100 TABLET ORAL 2 TIMES DAILY
Qty: 20 TABLET | Refills: 0 | Status: SHIPPED | OUTPATIENT
Start: 2025-08-31 | End: 2025-09-10

## 2025-08-31 RX ORDER — DIPHENHYDRAMINE HYDROCHLORIDE 50 MG/ML
25 INJECTION, SOLUTION INTRAMUSCULAR; INTRAVENOUS ONCE
Status: COMPLETED | OUTPATIENT
Start: 2025-08-31 | End: 2025-08-31

## 2025-08-31 RX ORDER — 0.9 % SODIUM CHLORIDE 0.9 %
1000 INTRAVENOUS SOLUTION INTRAVENOUS ONCE
Status: COMPLETED | OUTPATIENT
Start: 2025-08-31 | End: 2025-08-31

## 2025-08-31 RX ORDER — KETOROLAC TROMETHAMINE 15 MG/ML
15 INJECTION, SOLUTION INTRAMUSCULAR; INTRAVENOUS ONCE
Status: COMPLETED | OUTPATIENT
Start: 2025-08-31 | End: 2025-08-31

## 2025-08-31 RX ADMIN — DIPHENHYDRAMINE HYDROCHLORIDE 25 MG: 50 INJECTION INTRAMUSCULAR; INTRAVENOUS at 13:21

## 2025-08-31 RX ADMIN — KETOROLAC TROMETHAMINE 15 MG: 15 INJECTION, SOLUTION INTRAMUSCULAR; INTRAVENOUS at 13:19

## 2025-08-31 RX ADMIN — Medication 25 MG: at 13:23

## 2025-08-31 RX ADMIN — SODIUM CHLORIDE 1000 ML: 0.9 INJECTION, SOLUTION INTRAVENOUS at 13:18

## 2025-08-31 ASSESSMENT — LIFESTYLE VARIABLES
HOW MANY STANDARD DRINKS CONTAINING ALCOHOL DO YOU HAVE ON A TYPICAL DAY: PATIENT DOES NOT DRINK
HOW OFTEN DO YOU HAVE A DRINK CONTAINING ALCOHOL: NEVER

## (undated) DEVICE — FORCEP SPEC RETRV BX AD 2 MMX155 CM 5 MM GI OVL CUP W/ NDL